# Patient Record
Sex: FEMALE | Race: WHITE | NOT HISPANIC OR LATINO
[De-identification: names, ages, dates, MRNs, and addresses within clinical notes are randomized per-mention and may not be internally consistent; named-entity substitution may affect disease eponyms.]

---

## 2019-04-22 PROBLEM — Z00.00 ENCOUNTER FOR PREVENTIVE HEALTH EXAMINATION: Status: ACTIVE | Noted: 2019-04-22

## 2019-05-08 ENCOUNTER — FORM ENCOUNTER (OUTPATIENT)
Age: 74
End: 2019-05-08

## 2019-05-09 ENCOUNTER — OUTPATIENT (OUTPATIENT)
Dept: OUTPATIENT SERVICES | Facility: HOSPITAL | Age: 74
LOS: 1 days | End: 2019-05-09
Payer: COMMERCIAL

## 2019-05-09 ENCOUNTER — APPOINTMENT (OUTPATIENT)
Dept: NEUROSURGERY | Facility: CLINIC | Age: 74
End: 2019-05-09
Payer: COMMERCIAL

## 2019-05-09 VITALS
OXYGEN SATURATION: 96 % | RESPIRATION RATE: 18 BRPM | HEART RATE: 72 BPM | SYSTOLIC BLOOD PRESSURE: 118 MMHG | DIASTOLIC BLOOD PRESSURE: 66 MMHG

## 2019-05-09 DIAGNOSIS — Z87.39 PERSONAL HISTORY OF OTHER DISEASES OF THE MUSCULOSKELETAL SYSTEM AND CONNECTIVE TISSUE: ICD-10-CM

## 2019-05-09 DIAGNOSIS — Z87.01 PERSONAL HISTORY OF PNEUMONIA (RECURRENT): ICD-10-CM

## 2019-05-09 DIAGNOSIS — Z83.3 FAMILY HISTORY OF DIABETES MELLITUS: ICD-10-CM

## 2019-05-09 DIAGNOSIS — Z82.0 FAMILY HISTORY OF EPILEPSY AND OTHER DISEASES OF THE NERVOUS SYSTEM: ICD-10-CM

## 2019-05-09 DIAGNOSIS — Z78.9 OTHER SPECIFIED HEALTH STATUS: ICD-10-CM

## 2019-05-09 DIAGNOSIS — Z82.49 FAMILY HISTORY OF ISCHEMIC HEART DISEASE AND OTHER DISEASES OF THE CIRCULATORY SYSTEM: ICD-10-CM

## 2019-05-09 PROCEDURE — 72084 X-RAY EXAM ENTIRE SPI 6/> VW: CPT | Mod: 26

## 2019-05-09 PROCEDURE — 72080 X-RAY EXAM THORACOLMB 2/> VW: CPT

## 2019-05-09 PROCEDURE — 99204 OFFICE O/P NEW MOD 45 MIN: CPT

## 2019-05-09 PROCEDURE — 72084 X-RAY EXAM ENTIRE SPI 6/> VW: CPT

## 2019-05-09 PROCEDURE — 72083 X-RAY EXAM ENTIRE SPI 4/5 VW: CPT

## 2019-05-09 PROCEDURE — 72082 X-RAY EXAM ENTIRE SPI 2/3 VW: CPT

## 2019-05-14 PROBLEM — Z78.9 DOES NOT USE ILLICIT DRUGS: Status: ACTIVE | Noted: 2019-05-13

## 2019-05-14 PROBLEM — Z78.9 NON-SMOKER: Status: ACTIVE | Noted: 2019-05-13

## 2019-05-14 PROBLEM — Z83.3 FAMILY HISTORY OF DIABETES MELLITUS: Status: ACTIVE | Noted: 2019-05-13

## 2019-05-14 PROBLEM — Z87.39 HISTORY OF ARTHRITIS: Status: RESOLVED | Noted: 2019-05-13 | Resolved: 2019-05-14

## 2019-05-14 PROBLEM — Z87.01 HISTORY OF PNEUMONIA: Status: RESOLVED | Noted: 2019-05-13 | Resolved: 2019-05-14

## 2019-05-14 PROBLEM — Z87.39 HISTORY OF OSTEOPOROSIS: Status: RESOLVED | Noted: 2019-05-13 | Resolved: 2019-05-14

## 2019-05-14 PROBLEM — Z82.0 FAMILY HISTORY OF MIGRAINE HEADACHES: Status: ACTIVE | Noted: 2019-05-13

## 2019-05-14 PROBLEM — Z82.49 FAMILY HISTORY OF HEART FAILURE: Status: ACTIVE | Noted: 2019-05-13

## 2019-05-14 RX ORDER — OXYCODONE HYDROCHLORIDE AND ACETAMINOPHEN 10; 325 MG/1; MG/1
TABLET ORAL
Refills: 0 | Status: ACTIVE | COMMUNITY

## 2019-05-14 RX ORDER — MORPHINE SULFATE 30 MG/1
TABLET ORAL
Refills: 0 | Status: ACTIVE | COMMUNITY

## 2019-05-14 RX ORDER — CLONAZEPAM 2 MG/1
TABLET ORAL
Refills: 0 | Status: ACTIVE | COMMUNITY

## 2019-05-14 NOTE — DATA REVIEWED
[de-identified] : \par Patient: CARRIE العراقي\par YOB: 1945\par Phone: [phone]\par MRN: [id number] Acc: [id number]\par Date of Exam: 01-\par \par  \par   \par   \par  \par Exam requested by:\par BETY REINA MD\par [address] [facility] , [address]\par [facility] [address]\par \par \par  \par    \par    \par   \par   \par \par \par     \par   \par  \par EXAM: MR LUMBAR SPINE WITHOUT CONTRAST\par \par HISTORY: Low back pain. History of prior surgery.\par \par COMPARISON: MRI performed 4/14/2015\par \par TECHNIQUE: Sections: Sagittal T1, T2 and STIR, cor T1 and axial T1 and T2 weighted sequences were obtained.  \par Scanner: [employer] HDe at 1.5T\par \par FINDINGS:\par \par Alignment:  There is dextroscoliosis with moderate kyphosis at the L1-2 level. Again demonstrated is posterior fixation with bilateral transpedicular screws at L3 and L4 and posterior interconnecting hardware, and a separate lower thoracic and upper lumbar posterior fusion from T11 through L2 with bilateral transpedicular screws and posterior fusion hardware. Prior laminectomy from L1 through L4-5 noted.\par Spinal Cord:  The conus has normal signal intensity and position.\par Marrow Signal:  There is no worrisome bone marrow signal.\par \par T12-L1: There is no canal or foraminal stenosis.\par L1-2: There is a posterior osteophytic ridge and prior interbody fusion. There is no central canal stenosis, with posterior decompression noted. There is soft tissue in the left foramen which is likely epidural scar.  \par L2-3: There has been partial laminectomy. There is no central canal stenosis. There is slight degenerative retrolisthesis.\par L3-4: The canal is widely patent. There is no central canal stenosis. There is no foraminal narrowing.  \par L4-5: There is good posterior decompression, with no canal stenosis. There is mild to moderate foraminal narrowing bilaterally.  \par L5-S1: There are mild degenerative disc changes. There is no central canal or foraminal stenosis.  \par \par Retroperitoneal Structures:  Paraspinal muscle atrophy is again noted.\par \par IMPRESSION: \par Anterior and posterior fusion with prior laminectomy.\par No evidence of new disc herniation or recurrent spinal canal stenosis.\par Kyphosis at L1-2 noted and similar to previous. \par \par \par \par  \par  \par   \par   \par \par Thank you for the opportunity to participate in the care of this patient. \par  \par Aguilar Ansari MD Bd Cert Radiologist  - Electronically Signed: 01- 11:17 AM\par \par  \par   \par  \par   \par   \par  \par Copy to:JENNIFER ESPINOSA MD\par \par \par  \par Exam requested by:BETY REINA MD\par \par \par  \par   \par  [de-identified] : \par Patient: CARRIE العراقي\par YOB: 1945\par Phone: [phone]\par MRN: [id number] Acc: [id number]\par Date of Exam: 04-\par \par  \par   \par   \par  \par Exam requested by:\par BETY REINA MD\par [address] [facility] , [address]\par [facility] [address]\par \par \par  \par    \par    \par   \par   \par \par \par     \par   \par  \par EXAM: MR CERVICAL SPINE WITHOUT AND WITH CONTRAST\par \par HISTORY: Patient states neck pain; other\par \par TECHNIQUE: T1-weighted, T2-weighted and inversion recovery pulse sequences used to image the cervical spine in sagittal and axial plane, before and after intravenous gadolinium.  10 cc of Dotarem from a 10 cc single-use vial was injected.\par Scanner: [employer] HDe at 1.5T\par \par COMPARISON: X-ray cervical spine 6/27/2018 \par \par FINDINGS:\par \par There has been anterior cervical discectomy and fusion at C3 down to C6 level.\par \par At C3-C4 there is degenerative spondylosis with arthrosis of the joints of Luschka on the left.\par \par At C4-C5 there is degenerative disc disease with arthrosis of the joints of Luschka.\par \par At C5-6 there is degenerative spondylosis, arthrosis of the joints of Luschka with the spondylitic ridge on the right contributing to impingement on the right C6 nerve root in the lateral recess and neural foramen.\par \par At C6-C7 there is degenerative disc disease and spondylosis.\par \par At C7-T1 the disc is unremarkable.\par \par There is impingement on the ventral subarachnoid space at multiple levels with no high-grade spinal stenosis.\par \par No intramedullary signal abnormality. No pathologic enhancement.\par \par The craniovertebral junction is normal.\par \par The marrow is normal.\par \par Small right thyroid nodules are seen, one of them measuring 1.5 cm.\par \par IMPRESSION: Status post anterior cervical discectomy and fusion at C3 down through C6. Chronic degenerative spondylosis and arthrosis of the joints of Luschka. At C5-C6 the right-sided osteophytic ridge and arthrosis of the joints of Luschka impinge on the right C6 nerve root. \par \par \par \par  \par  \par   \par   \par \par Thank you for the opportunity to participate in the care of this patient. \par  \par Maged Talley MD Bd Cert Radiologist  - Electronically Signed: 04- 11:27 AM\par \par  \par   \par  \par   \par   \par  \par Copy to:CANDY POLLARD NP\par \par \par  \par Exam requested by:BETY REINA MD\par \par \par  \par   \par \par Exam requested by:\par BETY REINA MD\par [address] [place], [address]\par [facility] [address]\par \par \par  \par    \par    \par   \par   \par \par \par     \par   \par  \par EXAM:  MR THORACIC SPINE WITHOUT CONTRAST\par \par HISTORY:  Right-sided mid back pain\par \par COMPARISON: CT performed 5/16/2017\par \par TECHNIQUE:  Sagittal T1, T2, and STIR and axial T1 and T2 weighted sequences were obtained. Intravenous contrast: None. Scanner: [employer] HDe at 1.5T    \par \par FINDINGS: \par Alignment:   images through the cervical spine demonstrate anterior cervical fusion from C3 through C6.\par There are is posterior fusion from T11 through L2, with posterior interconnecting hardware and transpedicular screws. There are significant degenerative disc changes at T10-11. Kyphosis at L1-2 is noted.\par Spinal Cord:  There is canal narrowing with ventral impression on the cord at the T10-11 level. There is no evidence of cord signal abnormality.\par Marrow Signal:  There is no worrisome bone marrow signal.\par \par T1-T2: There is a small right paracentral protrusion with mild narrowing of the central canal.\par T7-T8: There is a small posterior osteophytic ridge. There is no significant canal or foraminal stenosis.\par T10-11: There is a central disc extrusion with osteophyte that causes moderate spinal canal stenosis with impression on the thoracic spinal cord. Correlating with the previous CT, there is a moderate amount of ossification associated with the disc material.\par \par Soft Tissue Structures:  Unremarkable.\par \par IMPRESSION:  \par Postsurgical changes. Prior posterior fusion from T11 through L2, with anterior cervical fusion noted as well.\par Moderate spinal canal narrowing at T10-11 due to a central disc extrusion which is at least partially ossified. The degree of canal narrowing appears similar to the previous CT performed 5/16/2017.    \par \par \par \par  \par  \par   \par   \par \par Thank you for the opportunity to participate in the care of this patient. \par  \par Aguilar Ansari MD Bd Cert Radiologist  - Electronically Signed: 01- 11:25 AM\par \par  \par   \par  \par   \par   \par  \par Copy to:JENNIFER ESPINOSA MD\par \par \par  \par Exam requested by:BETY REINA MD\par \par \par  \par  \par

## 2019-05-14 NOTE — PLAN
[FreeTextEntry1] : Plan:\par Case will be presented at spine conference. \par Will obtain x-rays of the standing spine, hip flexion/extension

## 2019-05-14 NOTE — RESULTS/DATA
[FreeTextEntry1] : REPORT 4/17/19\par MRI Cervical Spine- Chronic Degenerative Spondylosis (See Report for details)\par MRI thoracic-Chronic midline osteophytic ridge T10-T11 (see report for details)\par \par \par \par \par

## 2019-05-14 NOTE — ASSESSMENT
[FreeTextEntry1] : Comprehensive Spine Conference Discussion. Final Plan pending.\par Comprehensive Spinal Xray including scoliosis series\par Education provided regarding plan of care.\par \par

## 2019-05-14 NOTE — PHYSICAL EXAM
[General Appearance - Alert] : alert [General Appearance - In No Acute Distress] : in no acute distress [Impaired Insight] : insight and judgment were intact [Oriented To Time, Place, And Person] : oriented to person, place, and time [Cranial Nerves Optic (II)] : visual acuity intact bilaterally,  pupils equal round and reactive to light [Cranial Nerves Oculomotor (III)] : extraocular motion intact [Cranial Nerves Trigeminal (V)] : facial sensation intact symmetrically [Cranial Nerves Vestibulocochlear (VIII)] : hearing was intact bilaterally [Cranial Nerves Facial (VII)] : face symmetrical [Cranial Nerves Glossopharyngeal (IX)] : tongue and palate midline [Cranial Nerves Accessory (XI - Cranial And Spinal)] : head turning and shoulder shrug symmetric [Sensation Tactile Decrease] : light touch was intact [Cranial Nerves Hypoglossal (XII)] : there was no tongue deviation with protrusion [Limited Balance] : the patient's balance was impaired [Outer Ear] : the ears and nose were normal in appearance [] : no respiratory distress [Exaggerated Use Of Accessory Muscles For Inspiration] : no accessory muscle use [Heart Rate And Rhythm] : heart rate was normal and rhythm regular [Abdomen Soft] : soft [Skin Color & Pigmentation] : normal skin color and pigmentation [FreeTextEntry6] : Weakness in Bilateral Lower extremities [FreeTextEntry1] : Mild weakness BL LE

## 2019-05-14 NOTE — HISTORY OF PRESENT ILLNESS
[FreeTextEntry1] : CHRONIC Lowerback pain and Balance difficulty [de-identified] : 72 yo woman presenting for evaluation of low back pain as well as balance and gait issues. Patient has had multiple spinal surgeries in the past, including a C3-C6 anterior fusion in 2015 and T11-L2 posterior fusion in 2016. Since then, patient continues to complain of low back pain. Her legs also feel more "spastic" and will lock occasionally while she is walking, causing her to have trouble with her balance and gait. Patient is currently ambulating without assistance, but only for short distances. Patient denies leg weakness and numbness. Patient was also told she has an impinged nerve root in her neck, but is asymptomatic, denies upper extremity weakness or sensory changes.   \par \par Exam: patellar reflex mildly brisk\par \par Plan:\par Case will be presented at spine conference. \par Will obtain x-rays of the standing spine, hip flexion/extension

## 2019-06-13 ENCOUNTER — APPOINTMENT (OUTPATIENT)
Dept: NEUROSURGERY | Facility: CLINIC | Age: 74
End: 2019-06-13
Payer: COMMERCIAL

## 2019-06-13 VITALS
SYSTOLIC BLOOD PRESSURE: 121 MMHG | DIASTOLIC BLOOD PRESSURE: 67 MMHG | RESPIRATION RATE: 16 BRPM | OXYGEN SATURATION: 99 % | WEIGHT: 116 LBS | HEART RATE: 75 BPM | HEIGHT: 65 IN | BODY MASS INDEX: 19.33 KG/M2

## 2019-06-13 PROCEDURE — 99215 OFFICE O/P EST HI 40 MIN: CPT

## 2019-06-17 ENCOUNTER — OUTPATIENT (OUTPATIENT)
Dept: OUTPATIENT SERVICES | Facility: HOSPITAL | Age: 74
LOS: 1 days | End: 2019-06-17
Payer: COMMERCIAL

## 2019-06-17 DIAGNOSIS — Z22.321 CARRIER OR SUSPECTED CARRIER OF METHICILLIN SUSCEPTIBLE STAPHYLOCOCCUS AUREUS: ICD-10-CM

## 2019-06-17 DIAGNOSIS — M54.6 PAIN IN THORACIC SPINE: ICD-10-CM

## 2019-06-17 PROCEDURE — 87641 MR-STAPH DNA AMP PROBE: CPT

## 2019-06-18 LAB
MRSA PCR RESULT.: NEGATIVE — SIGNIFICANT CHANGE UP
S AUREUS DNA NOSE QL NAA+PROBE: NEGATIVE — SIGNIFICANT CHANGE UP

## 2019-06-18 NOTE — ASSESSMENT
[FreeTextEntry1] : Complex Thoracic Laminectomy and fusion recommended and pt is ready to proceed.\par Dr. Talon Retana will be involved in case.\par PST informational package provided. Need comprehensive medical clearance with [facility].\par Education provided regarding plan of care.\par \par

## 2019-06-18 NOTE — ADDENDUM
[FreeTextEntry1] : \par 		June 13, 2019\par \par Chadwick Bello MD\par [address] [facility]\[place] [address]\par \par Re:	Nicole Baeza \par \par Dear Dr. Bello:\par \par I saw Nicole and her  once again today.  We did review her films in our spine conference which included MRIs and plain x-rays, which do in fact show evidence of hardware breakage in her lumbar spine along with what appears to be a significant disc with cord compression in her midthoracic spine above her prior hardware.  This was confirmed by CT that she brought with her today dated May 21, 2019, which shows a large calcified disc at the level of her midthoracic spine with a significant amount of cord compression just above her prior hardware.\par \par I do think this disc is likely causing Nicole’s complaints of what sounds more like spasticity and ambulatory weakness in her legs.  We think this should be removed, and we would like to keep the surgery as simple as possible given the complexity of her spinal deformity.  It is our impression that this needs to be accomplished either through a lateral transversectomy approach versus a thoracotomy, and we will make a final decision regarding approach with our spine team.  In the interim, I have asked Nicole to get medical clearance for possible surgery sometime in July when we remove this disc fragment with the hopes of decompressing her spinal cord and improving her symptoms.  We did review the risks, benefits, and alternatives to surgery.  The fact of the matter is that if this disc is left alone, she is likely to progress over time, and that would leave her likely  myelopathic in her legs and paraparetic at the minimum in the near term.\par \par \par Nicole fully understands the recommendation and will be back in touch with us regarding setting a surgical date.  Thanks so much for your kind referral. \par \par Sincerely,\par \par \par \par Carlos Leon MD\par \par DL/ag [facility] #[id number]\par \par \par

## 2019-06-18 NOTE — PHYSICAL EXAM
[General Appearance - Alert] : alert [Impaired Insight] : insight and judgment were intact [General Appearance - In No Acute Distress] : in no acute distress [Oriented To Time, Place, And Person] : oriented to person, place, and time [Cranial Nerves Optic (II)] : visual acuity intact bilaterally,  pupils equal round and reactive to light [I: Normal Smell] : smell intact bilaterally [Cranial Nerves Trigeminal (V)] : facial sensation intact symmetrically [Cranial Nerves Oculomotor (III)] : extraocular motion intact [Cranial Nerves Vestibulocochlear (VIII)] : hearing was intact bilaterally [Cranial Nerves Facial (VII)] : face symmetrical [Cranial Nerves Accessory (XI - Cranial And Spinal)] : head turning and shoulder shrug symmetric [Cranial Nerves Glossopharyngeal (IX)] : tongue and palate midline [Cranial Nerves Hypoglossal (XII)] : there was no tongue deviation with protrusion [Limited Balance] : the patient's balance was impaired [Straight-Leg Raise Test - Left] : straight leg raise of the left leg was positive [3+] : Ankle jerk left 3+ [Straight-Leg Raise Test - Right] : straight leg raise of the right leg was positive [Antalgic] : antalgic [Sclera] : the sclera and conjunctiva were normal [Neck Appearance] : the appearance of the neck was normal [Outer Ear] : the ears and nose were normal in appearance [] : no respiratory distress [Exaggerated Use Of Accessory Muscles For Inspiration] : no accessory muscle use [Abdomen Soft] : soft [Heart Rate And Rhythm] : heart rate was normal and rhythm regular [Skin Color & Pigmentation] : normal skin color and pigmentation [FreeTextEntry6] : Bilateral Leg weakness [Able to heel walk] : the patient was not able to heel walk [Able to toe walk] : the patient was not able to toe walk

## 2019-06-18 NOTE — DATA REVIEWED
[de-identified] : THORACIC [de-identified] : 	\par EXAM: MRI THORACIC SPINE, WITH AND WITHOUT CONTRAST\par \par HISTORY: Other dorsalgia\par \par TECHNIQUE: T1-weighted, T2-weighted and inversion recovery pulse sequence used to image the thoracic spine sagittal and axial plane, before and after intravenous gadolinium. 10 cc of Dotarem from a 10 cc vial, 1.5 jaye magnet.    \par \par Scanner: [employer] HDe at 1.5T\par \par COMPARISON: 1/18/2019\par \par FINDINGS: \par \par Again noted is the fusion hardware in the lower thoracic and upper lumbar spine. \par \par No new suspicious marrow replacement or marrow edema. \par \par At T10-T11, again noted is the large midline osteophytic ridge impinging on the thoracic cord.\par \par No foraminal or spinal stenosis. \par \par The conus is normal. \par \par No paraspinal mass.\par \par No pathologic enhancement.\par \par IMPRESSION: Chronic midline osteophytic ridge at T10-T11 disc level impinging on the thoracic cord, similarly seen in January.\par Thank you for the opportunity to participate in the care of this patient. \par  \par Maged Talley MD Bd Cert Radiologist  - Electronically Signed: 04- 11:58 AM\par Copy to:LIU BARRERA MD\par Exam requested by:FANY REINA MD\par

## 2019-06-18 NOTE — REASON FOR VISIT
[Follow-Up: _____] : a [unfilled] follow-up visit [FreeTextEntry1] : Here for follow up discussion of plan of care.\par She underwent additional workup\par Case discussed at Spine conference.\par She continues to reports worsening gait difficulty and weakness in legs as well as worsening posture.

## 2019-06-18 NOTE — REVIEW OF SYSTEMS
[Feeling Tired] : feeling tired [Feeling Poorly] : feeling poorly [Leg Weakness] : leg weakness [Poor Coordination] : poor coordination [Tingling] : tingling [Numbness] : numbness [Abnormal Sensation] : an abnormal sensation [Hypersensitivity] : hypersensitivity [Difficulty Walking] : difficulty walking [Limping] : limping [Ataxia] : ataxia [Joint Pain] : joint pain [Arthralgias] : arthralgias [Limb Pain] : limb pain [Negative] : Heme/Lymph [Difficulty Writing] : no difficulty writing

## 2019-06-18 NOTE — RESULTS/DATA
[FreeTextEntry1] : Exam requested by:\par FANY REINA MD\par [address] [facility] , [address]\par [place] [address]\par EXAM: MR LUMBAR SPINE WITHOUT AND WITH CONTRAST\par \par HISTORY: Low back pain. History of prior surgery.\par \par TECHNIQUE: \par Sections: Sagittal T1, T2 and STIR, and axial T1 and T2 weighted sequences were obtained.   Postcontrast sagittal and axial T1 weighted sequences were obtained.  \par Intravenous Contrast:  10 cc of Dotarem was injected from a single dose 10 cc vial. \par Scanner: [email or web address] HDe at 1.5T\par \par COMPARISON: MRI lumbar spine 1/18/2019 and CT lumbar spine 5/16/2017.\par \par FINDINGS: \par \par Again seen are postsurgical changes of thoracolumbar posterior fusion extending from the T11-T4 with pedicle screws and rods. There are interbody implants at L3-L4 and L4-L5. Posterior interconnecting hardware at L1-L2 is again seen. Again seen are bilateral laminectomy defects at L1-L2, L3-L4, and L4-L5. Vertebral body heights are maintained. No focal bone marrow edema. Dextroscoliosis is again noted.\par \par Imaged lower spinal cord is normal in size and signal intensity. Conus medullaris is at L1. Paraspinal muscle atrophy is again noted. No abnormal enhancement identified.\par \par Evaluation of individual lumbar levels demonstrates:\par T12-L1: No significant spinal canal or neuroforaminal stenosis.\par L1-2: Postsurgical change with posterior decompression. Small disc osteophytic ridging which indents the ventral thecal sac without significant spinal canal stenosis. No significant neuroforaminal narrowing.\par L2-3: Postsurgical changes. No significant spinal canal stenosis. Mild left neuroforaminal narrowing.\par L3-4: Postsurgical change with posterior decompression. No spinal canal stenosis. Mild neuroforaminal narrowing.\par L4-5: Postsurgical change with posterior decompression. No spinal canal stenosis. Mild bilateral neuroforaminal narrowing.\par L5-S1:Mild facet arthropathy. No significant spinal canal or neuroforaminal stenosis.\par \par IMPRESSION: \par \par Thoracolumbar fusion with postoperative change, similar to prior. No significant spinal canal stenosis. No new disc herniation.\par Thank you for the opportunity to participate in the care of this patient. \par  \par Liu Tavarez MD Bd Eligible Radiologist  - Electronically Signed: 04- 10:44 AM\par Copy to:LIU BARRERA MD\par Exam requested by:FANY ERINA MD\par \par 	\par [facility]\par [address]\[place] [address]\par Phone: [phone]\par Fax: [phone]\par  \par  \par \par Patient: CARRIE العراقي\par YOB: 1945\par Phone: [phone]\par MRN: [id number] Acc: [id number]\par Date of Exam: 04-\par Exam requested by:\par BETY REINA MD\par [address] [facility] , [address]\par [facility] [address]\par EXAM: MR CERVICAL SPINE WITHOUT AND WITH CONTRAST\par \par HISTORY: Patient states neck pain; other\par \par TECHNIQUE: T1-weighted, T2-weighted and inversion recovery pulse sequences used to image the cervical spine in sagittal and axial plane, before and after intravenous gadolinium.  10 cc of Dotarem from a 10 cc single-use vial was injected.\par Scanner: [email or web address] HDe at 1.5T\par \par COMPARISON: X-ray cervical spine 6/27/2018 \par \par FINDINGS:\par \par There has been anterior cervical discectomy and fusion at C3 down to C6 level.\par \par At C3-C4 there is degenerative spondylosis with arthrosis of the joints of Luschka on the left.\par \par At C4-C5 there is degenerative disc disease with arthrosis of the joints of Luschka.\par \par At C5-6 there is degenerative spondylosis, arthrosis of the joints of Luschka with the spondylitic ridge on the right contributing to impingement on the right C6 nerve root in the lateral recess and neural foramen.\par \par At C6-C7 there is degenerative disc disease and spondylosis.\par \par At C7-T1 the disc is unremarkable.\par \par There is impingement on the ventral subarachnoid space at multiple levels with no high-grade spinal stenosis.\par \par No intramedullary signal abnormality. No pathologic enhancement.\par \par The craniovertebral junction is normal.\par \par The marrow is normal.\par \par Small right thyroid nodules are seen, one of them measuring 1.5 cm.\par \par IMPRESSION: Status post anterior cervical discectomy and fusion at C3 down through C6. Chronic degenerative spondylosis and arthrosis of the joints of Luschka. At C5-C6 the right-sided osteophytic ridge and arthrosis of the joints of Luschka impinge on the right C6 nerve root. \par Thank you for the opportunity to participate in the care of this patient. \par  \par Maged Talley MD Bd Cert Radiologist  - Electronically Signed: 04- 11:27 AM\par Copy to:CANDY POLLARD NP\par Exam requested by:BETY REINA MD\par Confidential\par \par \par Exam requested by:\par BETY REINA MD\par 4 [facility] , [address]\par [facility] [address]\par EXAM: MR CERVICAL SPINE WITHOUT AND WITH CONTRAST\par \par HISTORY: Patient states neck pain; other\par \par TECHNIQUE: T1-weighted, T2-weighted and inversion recovery pulse sequences used to image the cervical spine in sagittal and axial plane, before and after intravenous gadolinium.  10 cc of Dotarem from a 10 cc single-use vial was injected.\par Scanner: [email or web address] Signa ResoServ HDe at 1.5T\par \par COMPARISON: X-ray cervical spine 6/27/2018 \par \par FINDINGS:\par \par There has been anterior cervical discectomy and fusion at C3 down to C6 level.\par \par At C3-C4 there is degenerative spondylosis with arthrosis of the joints of Luschka on the left.\par \par At C4-C5 there is degenerative disc disease with arthrosis of the joints of Luschka.\par \par At C5-6 there is degenerative spondylosis, arthrosis of the joints of Luschka with the spondylitic ridge on the right contributing to impingement on the right C6 nerve root in the lateral recess and neural foramen.\par \par At C6-C7 there is degenerative disc disease and spondylosis.\par \par At C7-T1 the disc is unremarkable.\par \par There is impingement on the ventral subarachnoid space at multiple levels with no high-grade spinal stenosis.\par \par No intramedullary signal abnormality. No pathologic enhancement.\par \par The craniovertebral junction is normal.\par \par The marrow is normal.\par \par Small right thyroid nodules are seen, one of them measuring 1.5 cm.\par \par IMPRESSION: Status post anterior cervical discectomy and fusion at C3 down through C6. Chronic degenerative spondylosis and arthrosis of the joints of Luschka. At C5-C6 the right-sided osteophytic ridge and arthrosis of the joints of Luschka impinge on the right C6 nerve root. \par Thank you for the opportunity to participate in the care of this patient. \par  \par Maged Talley MD Bd Cert Radiologist  - Electronically Signed: 04- 11:27 AM\par Copy to:CANDY POLLARD NP\par Exam requested by:BETY REINA MD\par Confidential\par  \par ACCREDITATIONS .[facility] \par [facility]\[place] [address]\par tel. [phone]\par [facility]\par [facility] at [facility]\[place] [address]\par tel. [phone]\par [facility] [place] [address]\par tel. [phone]\par [facility]\par [facility]\[place] [address]\par tel. [phone]\par [facility] [place]  [address]\par tel. [phone]\par [facility]\[place] [address]\par tel. [phone]\par Printed: 06- 3:14 PM\par CARRIE العراقي (Exam: 04- 2:45 PM)\par Page 1 of 1\par

## 2019-07-16 ENCOUNTER — FORM ENCOUNTER (OUTPATIENT)
Age: 74
End: 2019-07-16

## 2019-07-17 ENCOUNTER — OUTPATIENT (OUTPATIENT)
Dept: OUTPATIENT SERVICES | Facility: HOSPITAL | Age: 74
LOS: 1 days | End: 2019-07-17
Payer: COMMERCIAL

## 2019-07-17 ENCOUNTER — APPOINTMENT (OUTPATIENT)
Dept: SPINE | Facility: CLINIC | Age: 74
End: 2019-07-17
Payer: COMMERCIAL

## 2019-07-17 VITALS
HEART RATE: 67 BPM | HEIGHT: 65 IN | SYSTOLIC BLOOD PRESSURE: 128 MMHG | DIASTOLIC BLOOD PRESSURE: 69 MMHG | BODY MASS INDEX: 19.33 KG/M2 | RESPIRATION RATE: 18 BRPM | OXYGEN SATURATION: 100 % | WEIGHT: 116 LBS

## 2019-07-17 PROCEDURE — 72110 X-RAY EXAM L-2 SPINE 4/>VWS: CPT | Mod: 26

## 2019-07-17 PROCEDURE — 72110 X-RAY EXAM L-2 SPINE 4/>VWS: CPT

## 2019-07-17 PROCEDURE — 99214 OFFICE O/P EST MOD 30 MIN: CPT

## 2019-07-19 VITALS
SYSTOLIC BLOOD PRESSURE: 142 MMHG | TEMPERATURE: 96 F | HEIGHT: 65 IN | OXYGEN SATURATION: 96 % | WEIGHT: 118.83 LBS | DIASTOLIC BLOOD PRESSURE: 67 MMHG | HEART RATE: 60 BPM | RESPIRATION RATE: 16 BRPM

## 2019-07-22 ENCOUNTER — INPATIENT (INPATIENT)
Facility: HOSPITAL | Age: 74
LOS: 10 days | Discharge: HOME | DRG: 460 | End: 2019-08-02
Attending: NEUROLOGICAL SURGERY | Admitting: NEUROLOGICAL SURGERY
Payer: COMMERCIAL

## 2019-07-22 ENCOUNTER — RESULT REVIEW (OUTPATIENT)
Age: 74
End: 2019-07-22

## 2019-07-22 DIAGNOSIS — Z98.890 OTHER SPECIFIED POSTPROCEDURAL STATES: Chronic | ICD-10-CM

## 2019-07-22 DIAGNOSIS — M81.0 AGE-RELATED OSTEOPOROSIS WITHOUT CURRENT PATHOLOGICAL FRACTURE: ICD-10-CM

## 2019-07-22 DIAGNOSIS — Z98.1 ARTHRODESIS STATUS: Chronic | ICD-10-CM

## 2019-07-22 DIAGNOSIS — M47.14 OTHER SPONDYLOSIS WITH MYELOPATHY, THORACIC REGION: ICD-10-CM

## 2019-07-22 DIAGNOSIS — R09.89 OTHER SPECIFIED SYMPTOMS AND SIGNS INVOLVING THE CIRCULATORY AND RESPIRATORY SYSTEMS: ICD-10-CM

## 2019-07-22 DIAGNOSIS — T43.225A ADVERSE EFFECT OF SELECTIVE SEROTONIN REUPTAKE INHIBITORS, INITIAL ENCOUNTER: ICD-10-CM

## 2019-07-22 DIAGNOSIS — M48.04 SPINAL STENOSIS, THORACIC REGION: ICD-10-CM

## 2019-07-22 DIAGNOSIS — M96.0 PSEUDARTHROSIS AFTER FUSION OR ARTHRODESIS: ICD-10-CM
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
## 2019-07-22 DIAGNOSIS — F32.9 MAJOR DEPRESSIVE DISORDER, SINGLE EPISODE, UNSPECIFIED: ICD-10-CM

## 2019-07-22 DIAGNOSIS — F41.9 ANXIETY DISORDER, UNSPECIFIED: ICD-10-CM

## 2019-07-22 DIAGNOSIS — Z98.1 ARTHRODESIS STATUS: ICD-10-CM

## 2019-07-22 DIAGNOSIS — Z98.890 OTHER SPECIFIED POSTPROCEDURAL STATES: ICD-10-CM
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
## 2019-07-22 DIAGNOSIS — M54.16 RADICULOPATHY, LUMBAR REGION: ICD-10-CM

## 2019-07-22 DIAGNOSIS — M54.9 DORSALGIA, UNSPECIFIED: ICD-10-CM

## 2019-07-22 DIAGNOSIS — D62 ACUTE POSTHEMORRHAGIC ANEMIA: ICD-10-CM

## 2019-07-22 DIAGNOSIS — E87.1 HYPO-OSMOLALITY AND HYPONATREMIA: ICD-10-CM

## 2019-07-22 DIAGNOSIS — E16.1 OTHER HYPOGLYCEMIA: ICD-10-CM
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
## 2019-07-22 DIAGNOSIS — K59.00 CONSTIPATION, UNSPECIFIED: ICD-10-CM

## 2019-07-22 DIAGNOSIS — Z86.718 PERSONAL HISTORY OF OTHER VENOUS THROMBOSIS AND EMBOLISM: ICD-10-CM

## 2019-07-22 DIAGNOSIS — M51.04 INTERVERTEBRAL DISC DISORDERS WITH MYELOPATHY, THORACIC REGION: ICD-10-CM

## 2019-07-22 DIAGNOSIS — G95.89 OTHER SPECIFIED DISEASES OF SPINAL CORD: ICD-10-CM

## 2019-07-22 LAB
ANION GAP SERPL CALC-SCNC: 9 MMOL/L — SIGNIFICANT CHANGE UP (ref 5–17)
BASE EXCESS BLDA CALC-SCNC: -2.4 MMOL/L — LOW (ref -2–3)
BASOPHILS # BLD AUTO: 0.03 K/UL — SIGNIFICANT CHANGE UP (ref 0–0.2)
BASOPHILS NFR BLD AUTO: 0.2 % — SIGNIFICANT CHANGE UP (ref 0–2)
BUN SERPL-MCNC: 11 MG/DL — SIGNIFICANT CHANGE UP (ref 7–23)
CA-I BLDA-SCNC: 1.01 MMOL/L — LOW (ref 1.12–1.3)
CALCIUM SERPL-MCNC: 7.9 MG/DL — LOW (ref 8.4–10.5)
CHLORIDE SERPL-SCNC: 101 MMOL/L — SIGNIFICANT CHANGE UP (ref 96–108)
CO2 SERPL-SCNC: 23 MMOL/L — SIGNIFICANT CHANGE UP (ref 22–31)
COHGB MFR BLDA: 0.2 % — SIGNIFICANT CHANGE UP
CREAT SERPL-MCNC: 0.56 MG/DL — SIGNIFICANT CHANGE UP (ref 0.5–1.3)
EOSINOPHIL # BLD AUTO: 0.02 K/UL — SIGNIFICANT CHANGE UP (ref 0–0.5)
EOSINOPHIL NFR BLD AUTO: 0.2 % — SIGNIFICANT CHANGE UP (ref 0–6)
GLUCOSE BLDC GLUCOMTR-MCNC: 142 MG/DL — HIGH (ref 70–99)
GLUCOSE BLDC GLUCOMTR-MCNC: 154 MG/DL — HIGH (ref 70–99)
GLUCOSE SERPL-MCNC: 193 MG/DL — HIGH (ref 70–99)
HCO3 BLDA-SCNC: 21 MMOL/L — SIGNIFICANT CHANGE UP (ref 21–28)
HCT VFR BLD CALC: 31.6 % — LOW (ref 34.5–45)
HGB BLD-MCNC: 10.1 G/DL — LOW (ref 11.5–15.5)
HGB BLDA-MCNC: 11.1 G/DL — LOW (ref 11.5–15.5)
IMM GRANULOCYTES NFR BLD AUTO: 0.8 % — SIGNIFICANT CHANGE UP (ref 0–1.5)
LYMPHOCYTES # BLD AUTO: 1.01 K/UL — SIGNIFICANT CHANGE UP (ref 1–3.3)
LYMPHOCYTES # BLD AUTO: 7.8 % — LOW (ref 13–44)
MCHC RBC-ENTMCNC: 29 PG — SIGNIFICANT CHANGE UP (ref 27–34)
MCHC RBC-ENTMCNC: 32 GM/DL — SIGNIFICANT CHANGE UP (ref 32–36)
MCV RBC AUTO: 90.8 FL — SIGNIFICANT CHANGE UP (ref 80–100)
METHGB MFR BLDA: 0.6 % — SIGNIFICANT CHANGE UP
MONOCYTES # BLD AUTO: 0.15 K/UL — SIGNIFICANT CHANGE UP (ref 0–0.9)
MONOCYTES NFR BLD AUTO: 1.2 % — LOW (ref 2–14)
NEUTROPHILS # BLD AUTO: 11.69 K/UL — HIGH (ref 1.8–7.4)
NEUTROPHILS NFR BLD AUTO: 89.8 % — HIGH (ref 43–77)
NRBC # BLD: 0 /100 WBCS — SIGNIFICANT CHANGE UP (ref 0–0)
O2 CT VFR BLDA CALC: 16.4 ML/DL — SIGNIFICANT CHANGE UP (ref 15–23)
OXYHGB MFR BLDA: 99 % — SIGNIFICANT CHANGE UP (ref 94–100)
PCO2 BLDA: 32 MMHG — SIGNIFICANT CHANGE UP (ref 32–45)
PH BLDA: 7.44 — SIGNIFICANT CHANGE UP (ref 7.35–7.45)
PLATELET # BLD AUTO: 218 K/UL — SIGNIFICANT CHANGE UP (ref 150–400)
PO2 BLDA: 375 MMHG — HIGH (ref 83–108)
POTASSIUM BLDA-SCNC: 3.3 MMOL/L — LOW (ref 3.5–4.9)
POTASSIUM SERPL-MCNC: 3.5 MMOL/L — SIGNIFICANT CHANGE UP (ref 3.5–5.3)
POTASSIUM SERPL-SCNC: 3.5 MMOL/L — SIGNIFICANT CHANGE UP (ref 3.5–5.3)
RBC # BLD: 3.48 M/UL — LOW (ref 3.8–5.2)
RBC # FLD: 14.1 % — SIGNIFICANT CHANGE UP (ref 10.3–14.5)
SAO2 % BLDA: 100 % — SIGNIFICANT CHANGE UP (ref 95–100)
SODIUM BLDA-SCNC: 132 MMOL/L — LOW (ref 138–146)
SODIUM SERPL-SCNC: 133 MMOL/L — LOW (ref 135–145)
WBC # BLD: 13 K/UL — HIGH (ref 3.8–10.5)
WBC # FLD AUTO: 13 K/UL — HIGH (ref 3.8–10.5)

## 2019-07-22 PROCEDURE — 22614 ARTHRD PST TQ 1NTRSPC EA ADD: CPT | Mod: 62

## 2019-07-22 PROCEDURE — 22842 INSERT SPINE FIXATION DEVICE: CPT | Mod: 80

## 2019-07-22 PROCEDURE — 63046 LAM FACETEC & FORAMOT THRC: CPT | Mod: 80

## 2019-07-22 PROCEDURE — 63046 LAM FACETEC & FORAMOT THRC: CPT

## 2019-07-22 PROCEDURE — 61783 SCAN PROC SPINAL: CPT | Mod: 80,59

## 2019-07-22 PROCEDURE — 63048 LAM FACETEC &FORAMOT EA ADDL: CPT | Mod: 80

## 2019-07-22 PROCEDURE — 22610 ARTHRD PST TQ 1NTRSPC THRC: CPT | Mod: 62

## 2019-07-22 PROCEDURE — 99221 1ST HOSP IP/OBS SF/LOW 40: CPT

## 2019-07-22 PROCEDURE — 22842 INSERT SPINE FIXATION DEVICE: CPT

## 2019-07-22 PROCEDURE — 63048 LAM FACETEC &FORAMOT EA ADDL: CPT

## 2019-07-22 PROCEDURE — 61783 SCAN PROC SPINAL: CPT | Mod: 59

## 2019-07-22 PROCEDURE — 72128 CT CHEST SPINE W/O DYE: CPT | Mod: 26

## 2019-07-22 RX ORDER — TRAZODONE HCL 50 MG
0 TABLET ORAL
Qty: 0 | Refills: 0 | DISCHARGE

## 2019-07-22 RX ORDER — DEXAMETHASONE 0.5 MG/5ML
4 ELIXIR ORAL EVERY 6 HOURS
Refills: 0 | Status: DISCONTINUED | OUTPATIENT
Start: 2019-07-22 | End: 2019-07-25

## 2019-07-22 RX ORDER — HYDROMORPHONE HYDROCHLORIDE 2 MG/ML
0.5 INJECTION INTRAMUSCULAR; INTRAVENOUS; SUBCUTANEOUS
Refills: 0 | Status: DISCONTINUED | OUTPATIENT
Start: 2019-07-22 | End: 2019-07-23

## 2019-07-22 RX ORDER — SODIUM CHLORIDE 9 MG/ML
1000 INJECTION, SOLUTION INTRAVENOUS
Refills: 0 | Status: DISCONTINUED | OUTPATIENT
Start: 2019-07-22 | End: 2019-07-24

## 2019-07-22 RX ORDER — DOCUSATE SODIUM 100 MG
100 CAPSULE ORAL THREE TIMES A DAY
Refills: 0 | Status: DISCONTINUED | OUTPATIENT
Start: 2019-07-22 | End: 2019-08-02

## 2019-07-22 RX ORDER — DEXTROSE 50 % IN WATER 50 %
25 SYRINGE (ML) INTRAVENOUS ONCE
Refills: 0 | Status: DISCONTINUED | OUTPATIENT
Start: 2019-07-22 | End: 2019-08-02

## 2019-07-22 RX ORDER — TRAZODONE HCL 50 MG
25 TABLET ORAL AT BEDTIME
Refills: 0 | Status: DISCONTINUED | OUTPATIENT
Start: 2019-07-22 | End: 2019-07-25

## 2019-07-22 RX ORDER — CEFEPIME 1 G/1
1000 INJECTION, POWDER, FOR SOLUTION INTRAMUSCULAR; INTRAVENOUS EVERY 12 HOURS
Refills: 0 | Status: COMPLETED | OUTPATIENT
Start: 2019-07-23 | End: 2019-07-23

## 2019-07-22 RX ORDER — DIAZEPAM 5 MG
5 TABLET ORAL EVERY 8 HOURS
Refills: 0 | Status: DISCONTINUED | OUTPATIENT
Start: 2019-07-22 | End: 2019-07-29

## 2019-07-22 RX ORDER — PANTOPRAZOLE SODIUM 20 MG/1
40 TABLET, DELAYED RELEASE ORAL
Refills: 0 | Status: DISCONTINUED | OUTPATIENT
Start: 2019-07-22 | End: 2019-07-22

## 2019-07-22 RX ORDER — HYDROMORPHONE HYDROCHLORIDE 2 MG/ML
30 INJECTION INTRAMUSCULAR; INTRAVENOUS; SUBCUTANEOUS
Refills: 0 | Status: DISCONTINUED | OUTPATIENT
Start: 2019-07-22 | End: 2019-07-23

## 2019-07-22 RX ORDER — SENNA PLUS 8.6 MG/1
2 TABLET ORAL AT BEDTIME
Refills: 0 | Status: DISCONTINUED | OUTPATIENT
Start: 2019-07-22 | End: 2019-08-02

## 2019-07-22 RX ORDER — ONDANSETRON 8 MG/1
4 TABLET, FILM COATED ORAL EVERY 6 HOURS
Refills: 0 | Status: DISCONTINUED | OUTPATIENT
Start: 2019-07-22 | End: 2019-08-02

## 2019-07-22 RX ORDER — MAGNESIUM HYDROXIDE 400 MG/1
30 TABLET, CHEWABLE ORAL EVERY 12 HOURS
Refills: 0 | Status: DISCONTINUED | OUTPATIENT
Start: 2019-07-22 | End: 2019-08-02

## 2019-07-22 RX ORDER — GLUCAGON INJECTION, SOLUTION 0.5 MG/.1ML
1 INJECTION, SOLUTION SUBCUTANEOUS ONCE
Refills: 0 | Status: DISCONTINUED | OUTPATIENT
Start: 2019-07-22 | End: 2019-08-02

## 2019-07-22 RX ORDER — NALOXONE HYDROCHLORIDE 4 MG/.1ML
0.1 SPRAY NASAL
Refills: 0 | Status: DISCONTINUED | OUTPATIENT
Start: 2019-07-22 | End: 2019-08-02

## 2019-07-22 RX ORDER — CLONAZEPAM 1 MG
1 TABLET ORAL THREE TIMES A DAY
Refills: 0 | Status: DISCONTINUED | OUTPATIENT
Start: 2019-07-22 | End: 2019-07-23

## 2019-07-22 RX ORDER — DEXTROSE 50 % IN WATER 50 %
12.5 SYRINGE (ML) INTRAVENOUS ONCE
Refills: 0 | Status: DISCONTINUED | OUTPATIENT
Start: 2019-07-22 | End: 2019-08-02

## 2019-07-22 RX ORDER — BUPIVACAINE 13.3 MG/ML
20 INJECTION, SUSPENSION, LIPOSOMAL INFILTRATION ONCE
Refills: 0 | Status: DISCONTINUED | OUTPATIENT
Start: 2019-07-22 | End: 2019-07-22

## 2019-07-22 RX ORDER — ENOXAPARIN SODIUM 100 MG/ML
40 INJECTION SUBCUTANEOUS AT BEDTIME
Refills: 0 | Status: DISCONTINUED | OUTPATIENT
Start: 2019-07-23 | End: 2019-08-02

## 2019-07-22 RX ORDER — FAMOTIDINE 10 MG/ML
20 INJECTION INTRAVENOUS EVERY 12 HOURS
Refills: 0 | Status: COMPLETED | OUTPATIENT
Start: 2019-07-22 | End: 2019-07-27

## 2019-07-22 RX ORDER — DEXTROSE 50 % IN WATER 50 %
15 SYRINGE (ML) INTRAVENOUS ONCE
Refills: 0 | Status: DISCONTINUED | OUTPATIENT
Start: 2019-07-22 | End: 2019-08-02

## 2019-07-22 RX ORDER — SERTRALINE 25 MG/1
125 TABLET, FILM COATED ORAL DAILY
Refills: 0 | Status: DISCONTINUED | OUTPATIENT
Start: 2019-07-22 | End: 2019-08-02

## 2019-07-22 RX ORDER — INSULIN LISPRO 100/ML
VIAL (ML) SUBCUTANEOUS
Refills: 0 | Status: DISCONTINUED | OUTPATIENT
Start: 2019-07-22 | End: 2019-07-29

## 2019-07-22 RX ORDER — SODIUM CHLORIDE 9 MG/ML
1000 INJECTION, SOLUTION INTRAVENOUS
Refills: 0 | Status: DISCONTINUED | OUTPATIENT
Start: 2019-07-22 | End: 2019-08-02

## 2019-07-22 RX ORDER — ACETAMINOPHEN 500 MG
1000 TABLET ORAL ONCE
Refills: 0 | Status: COMPLETED | OUTPATIENT
Start: 2019-07-22 | End: 2019-07-22

## 2019-07-22 RX ADMIN — HYDROMORPHONE HYDROCHLORIDE 0.5 MILLIGRAM(S): 2 INJECTION INTRAMUSCULAR; INTRAVENOUS; SUBCUTANEOUS at 16:08

## 2019-07-22 RX ADMIN — Medication 1 DROP(S): at 17:58

## 2019-07-22 RX ADMIN — Medication 4 MILLIGRAM(S): at 17:43

## 2019-07-22 RX ADMIN — FAMOTIDINE 20 MILLIGRAM(S): 10 INJECTION INTRAVENOUS at 17:58

## 2019-07-22 RX ADMIN — Medication 5 MILLIGRAM(S): at 22:33

## 2019-07-22 RX ADMIN — SENNA PLUS 2 TABLET(S): 8.6 TABLET ORAL at 22:33

## 2019-07-22 RX ADMIN — HYDROMORPHONE HYDROCHLORIDE 0.5 MILLIGRAM(S): 2 INJECTION INTRAMUSCULAR; INTRAVENOUS; SUBCUTANEOUS at 16:06

## 2019-07-22 RX ADMIN — Medication 1000 MILLIGRAM(S): at 23:05

## 2019-07-22 RX ADMIN — HYDROMORPHONE HYDROCHLORIDE 30 MILLILITER(S): 2 INJECTION INTRAMUSCULAR; INTRAVENOUS; SUBCUTANEOUS at 16:59

## 2019-07-22 RX ADMIN — SODIUM CHLORIDE 80 MILLILITER(S): 9 INJECTION, SOLUTION INTRAVENOUS at 17:44

## 2019-07-22 RX ADMIN — Medication 100 MILLIGRAM(S): at 22:33

## 2019-07-22 RX ADMIN — Medication 25 MILLIGRAM(S): at 22:33

## 2019-07-22 RX ADMIN — HYDROMORPHONE HYDROCHLORIDE 0.5 MILLIGRAM(S): 2 INJECTION INTRAMUSCULAR; INTRAVENOUS; SUBCUTANEOUS at 16:40

## 2019-07-22 RX ADMIN — Medication 400 MILLIGRAM(S): at 22:32

## 2019-07-22 RX ADMIN — HYDROMORPHONE HYDROCHLORIDE 0.5 MILLIGRAM(S): 2 INJECTION INTRAMUSCULAR; INTRAVENOUS; SUBCUTANEOUS at 16:43

## 2019-07-22 NOTE — H&P ADULT - NSICDXPASTSURGICALHX_GEN_ALL_CORE_FT
PAST SURGICAL HISTORY:  History of back surgery x4    History of lumbar fusion     History of neck surgery

## 2019-07-22 NOTE — H&P ADULT - NSHPPHYSICALEXAM_GEN_ALL_CORE
AA&OX3, NAD, coherent speech  CNs II-XII grossly intact  motor: LLE DF/PF, EHL 5/5,  o/w b/l LE 4/5,   b/l UE 5/5,sensation to LT grossly intact throughout  Card: S1S2 NSR  Pulm: CTA b/l  Abd: soft, non-tender, nondistended

## 2019-07-22 NOTE — PROGRESS NOTE ADULT - SUBJECTIVE AND OBJECTIVE BOX
NEUROSURGERY POST-OP NOTE:    Patient seen and examined in PACU s/p re-exploration of previous spinal fusion, T10-T11 laminectomy, T9-T12 instrumentation and fusion, plastics closure. JPx1, HMVx1. PCA in place.    HPI:      Hospital course:   POD#0: s/p re-exploration of previous spinal fusion, T10-T11 laminectomy, T9-T12 instrumentation and fusion, plastics closure.    Vital Signs Last 24 Hrs  T(C): --  T(F): --  HR: 94 (22 Jul 2019 16:08) (94 - 100)  BP: 147/73 (22 Jul 2019 16:08) (136/70 - 150/70)  BP(mean): 91 (22 Jul 2019 16:08) (90 - 92)  RR: 12 (22 Jul 2019 16:08) (12 - 17)  SpO2: 100% (22 Jul 2019 16:08) (100% - 100%)    I&O's Summary    22 Jul 2019 07:01  -  22 Jul 2019 16:43  --------------------------------------------------------  IN: 0 mL / OUT: 125 mL / NET: -125 mL        PHYSICAL EXAM:  NEURO:  AAOx3, NAD, coherent speech, following commands  CNII-XII grossly intact  MAEx4, strength 4/5 LE b/l, 5/5 UE b/l, no drift  SILT throughout b/l  Incision/wound: thoracic incision clean, dry and intact; + dressing in place  +KEVIN (superficial)  +HMV (deep)      TUBES/LINES:  [x] Ybarra  [x] A-line  [] Lumbar Drain  [x] Wound Drains  -KEVIN (superficial)  -HMV (deep)  [] NGT   [] EVD   [] CVC  [] Other      DIET:  [x] NPO  [] Mechanical  [] Tube feeds    LABS:                        10.1   13.00 )-----------( 218      ( 22 Jul 2019 16:19 )             31.6                   CAPILLARY BLOOD GLUCOSE      POCT Blood Glucose.: 154 mg/dL (22 Jul 2019 14:49)      Drug Levels: [] N/A    CSF Analysis: [] N/A      Allergies    iodine (Unknown)    Intolerances        Home Medications:  Calcium 600+D oral tablet:  (22 Jul 2019 07:01)  CeleBREX 200 mg oral capsule:  (22 Jul 2019 07:01)  KlonoPIN 1 mg oral tablet: 1 tab(s) orally 3 times a day (22 Jul 2019 07:01)  morphine 30 mg oral capsule:  (22 Jul 2019 07:01)  oxyCODONE-acetaminophen 10 mg-325 mg oral tablet: 1 tab(s) orally every 6 hours (22 Jul 2019 07:01)  traZODone 50 mg oral tablet: 0.5  orally (22 Jul 2019 07:01)  Zoloft: orally once a day (22 Jul 2019 07:01)      MEDICATIONS:  MEDICATIONS  (STANDING):  acetaminophen  IVPB .. 1000 milliGRAM(s) IV Intermittent once  dexamethasone  Injectable 4 milliGRAM(s) IV Push every 6 hours  dextrose 5%. 1000 milliLiter(s) (50 mL/Hr) IV Continuous <Continuous>  dextrose 50% Injectable 12.5 Gram(s) IV Push once  dextrose 50% Injectable 25 Gram(s) IV Push once  dextrose 50% Injectable 25 Gram(s) IV Push once  diazepam    Tablet 5 milliGRAM(s) Oral every 8 hours  docusate sodium 100 milliGRAM(s) Oral three times a day  famotidine    Tablet 20 milliGRAM(s) Oral every 12 hours  HYDROmorphone PCA (1 mG/mL) 30 milliLiter(s) PCA Continuous PCA Continuous  insulin lispro (HumaLOG) corrective regimen sliding scale   SubCutaneous three times a day before meals  lactated ringers. 1000 milliLiter(s) (80 mL/Hr) IV Continuous <Continuous>  senna 2 Tablet(s) Oral at bedtime  sertraline 125 milliGRAM(s) Oral daily  traZODone 25 milliGRAM(s) Oral at bedtime    MEDICATIONS  (PRN):  clonazePAM  Tablet 1 milliGRAM(s) Oral three times a day PRN anxiety  dextrose 40% Gel 15 Gram(s) Oral once PRN Blood Glucose LESS THAN 70 milliGRAM(s)/deciliter  glucagon  Injectable 1 milliGRAM(s) IntraMuscular once PRN Glucose LESS THAN 70 milligrams/deciliter  HYDROmorphone  Injectable 0.5 milliGRAM(s) IV Push every 30 minutes PRN Severe Pain (7 - 10)  magnesium hydroxide Suspension 30 milliLiter(s) Oral every 12 hours PRN Constipation  naloxone Injectable 0.1 milliGRAM(s) IV Push every 3 minutes PRN For ANY of the following changes in patient status:  A. RR LESS THAN 10 breaths per minute, B. Oxygen saturation LESS THAN 90%, C. Sedation score of 6  ondansetron Injectable 4 milliGRAM(s) IV Push every 6 hours PRN Nausea      CULTURES:      RADIOLOGY & ADDITIONAL TESTS:      ASSESSMENT:  74y Female s/p re-exploration of previous spinal fusion, T10-T11 laminectomy, T9-T12 instrumentation and fusion, plastics closure, POD#1    PLAN:  -neuro/spinal checks  -dilaudid PCA, standing valium   -decadron 4q6 for now, f/u tapering dose   -monitor thoracic wound- plastics closure (Ronit Genao)  -JPx1 (superficial), HMVx1 (deep)- monitor outputs   -SBP normotensive  -room air, satting well  -IVF for now  -advance diet as tolerated  -bowel regimen  -GI ppx  -ISS  -post op abx  -continue home meds  -f/u post op labs  -OOB/PT/OT  -d/w Dr. Retana       Assessment: present when checked     [] GCS   E   V   M     Heart Failure: [] Acute, [] acute on chronic, [] chronic   Heart Failure: [] Diastolic (HFpEF), [] Systolic (HRrEF), [] Combined (HFpEF and HFrEF), [] RHF, [] Pulm HTN, [] Other     [] LUIZA, [] ATN, [] AIN, [] other   [] CKD1, [] CKD2, [] CKD3, [] CKD4, [] CKD5, [] ESRD     Encephalopathy: [] Metabolic, [] Hepatic, [] Toxic, [] Neurological, [] Other     Abnormal Nutritional Status: [] malnutrition (see nutrition note), []underweight: BMI <19, [] morbid obesity: BMI >40, [] Cachexia     [] Sepsis   [] Hypovolemic shock, [] Cardiogenic shock, [] Hemorrhagic shock, [] Neurogenic shock   [] Acute respiratory failure   [] Cerebral edema, [] Brain compression / herniation   [] Functional quadriplegia   [] Acute blood loss anemia

## 2019-07-22 NOTE — PACU DISCHARGE NOTE - COMMENTS
Pain management in progress with PCA- alert and oriented x4- follow commands- moving all extremities- right aide hemovac and left KEVIN drain patent- Ybarra patent and draining- tolerating ice chips and clear liquid- denies N/V- report given to 9 stacia nurse -pt left unit via bed on supplemental oxygen and PCA pump and IVF to 938-1 accompanied by nurse and PCA

## 2019-07-22 NOTE — H&P ADULT - HISTORY OF PRESENT ILLNESS
Taken from Dr. Retana's office note 7/17/2019  "Patient presents to be evaluated for surgical intervention for progressively worsening gait disturbance.  She reports a h/o cervical spine surgery, multiple lumbar spine procedures with the most recent x 3 years ago for scoliosis repair. Since her lower back surgery she has persistent lower back pain and most recent mid upper back pain with onset x 1 year ago.    She has undergone multiple conservative measures ranging from the use of oral pain medications (currently utilizes Percocet and Morphine tabs), NADIR's and as last resort a spinal cord stimulator trial which have all failed at provided lasting symptom relief.    She is off balance and best describes her gait a "walking on a train""

## 2019-07-22 NOTE — CHART NOTE - NSCHARTNOTEFT_GEN_A_CORE
Infectious Diseases Anti-infective Approval Note    Medication:  Cefepime   Dose:  1 gram  Route:  IV  Frequency:  q12  Duration:  2 doses    Dose may be adjusted as needed for alterations in renal function.    *THIS IS NOT AN INFECTIOUS DISEASES CONSULTATION*
Neurosurgical Indications for Screening Dopplers on Admission:       1) Known hypercoagulation disorder (h/o VTE, thrombophilia, HIT, etc.)   2) Admitted from prolonged stay from another institution (straight forward ER transfers not included)  3) Presenting with significant leg immobility   4) Presenting with signs and symptoms of VTE?    5) With significant critical illness, Including "found down" for unknown period of time in HPI  6) With significant neurotrauma (TBI, SCI / TLS spine fractures)   7) Who are comatose   8) With known malignancy (e.g. glioblastoma multiforme, meningioma, etc.). Excludes IA chemo 23hr observation stays  9) On hemodialysis   10) Who have received platelet transfusion or antithrombotic reversal agents recently   11) Who have had recent major orthopedic surgery          Screening dopplers indicated?   Y x  N _    DVT Prophylaxis:  x SCD's   _ chemoprophylaxis

## 2019-07-22 NOTE — BRIEF OPERATIVE NOTE - OPERATION/FINDINGS
re-exploration of spinal fusion  T10-T11 laminectomy   T9-T12 instrumentation and fusion  plastics closure

## 2019-07-23 LAB
ANION GAP SERPL CALC-SCNC: 9 MMOL/L — SIGNIFICANT CHANGE UP (ref 5–17)
BASOPHILS # BLD AUTO: 0.01 K/UL — SIGNIFICANT CHANGE UP (ref 0–0.2)
BASOPHILS NFR BLD AUTO: 0.1 % — SIGNIFICANT CHANGE UP (ref 0–2)
BUN SERPL-MCNC: 13 MG/DL — SIGNIFICANT CHANGE UP (ref 7–23)
CALCIUM SERPL-MCNC: 8.3 MG/DL — LOW (ref 8.4–10.5)
CHLORIDE SERPL-SCNC: 101 MMOL/L — SIGNIFICANT CHANGE UP (ref 96–108)
CO2 SERPL-SCNC: 25 MMOL/L — SIGNIFICANT CHANGE UP (ref 22–31)
CREAT SERPL-MCNC: 0.58 MG/DL — SIGNIFICANT CHANGE UP (ref 0.5–1.3)
EOSINOPHIL # BLD AUTO: 0 K/UL — SIGNIFICANT CHANGE UP (ref 0–0.5)
EOSINOPHIL NFR BLD AUTO: 0 % — SIGNIFICANT CHANGE UP (ref 0–6)
GLUCOSE BLDC GLUCOMTR-MCNC: 121 MG/DL — HIGH (ref 70–99)
GLUCOSE BLDC GLUCOMTR-MCNC: 125 MG/DL — HIGH (ref 70–99)
GLUCOSE BLDC GLUCOMTR-MCNC: 129 MG/DL — HIGH (ref 70–99)
GLUCOSE BLDC GLUCOMTR-MCNC: 144 MG/DL — HIGH (ref 70–99)
GLUCOSE SERPL-MCNC: 125 MG/DL — HIGH (ref 70–99)
HBA1C BLD-MCNC: 5.3 % — SIGNIFICANT CHANGE UP (ref 4–5.6)
HCT VFR BLD CALC: 26.9 % — LOW (ref 34.5–45)
HCV AB S/CO SERPL IA: 0.15 S/CO — SIGNIFICANT CHANGE UP
HCV AB SERPL-IMP: SIGNIFICANT CHANGE UP
HGB BLD-MCNC: 8.4 G/DL — LOW (ref 11.5–15.5)
IMM GRANULOCYTES NFR BLD AUTO: 0.6 % — SIGNIFICANT CHANGE UP (ref 0–1.5)
LYMPHOCYTES # BLD AUTO: 0.82 K/UL — LOW (ref 1–3.3)
LYMPHOCYTES # BLD AUTO: 5.7 % — LOW (ref 13–44)
MAGNESIUM SERPL-MCNC: 1.8 MG/DL — SIGNIFICANT CHANGE UP (ref 1.6–2.6)
MCHC RBC-ENTMCNC: 29.1 PG — SIGNIFICANT CHANGE UP (ref 27–34)
MCHC RBC-ENTMCNC: 31.2 GM/DL — LOW (ref 32–36)
MCV RBC AUTO: 93.1 FL — SIGNIFICANT CHANGE UP (ref 80–100)
MONOCYTES # BLD AUTO: 0.59 K/UL — SIGNIFICANT CHANGE UP (ref 0–0.9)
MONOCYTES NFR BLD AUTO: 4.1 % — SIGNIFICANT CHANGE UP (ref 2–14)
NEUTROPHILS # BLD AUTO: 12.98 K/UL — HIGH (ref 1.8–7.4)
NEUTROPHILS NFR BLD AUTO: 89.5 % — HIGH (ref 43–77)
NRBC # BLD: 0 /100 WBCS — SIGNIFICANT CHANGE UP (ref 0–0)
PHOSPHATE SERPL-MCNC: 2.8 MG/DL — SIGNIFICANT CHANGE UP (ref 2.5–4.5)
PLATELET # BLD AUTO: 185 K/UL — SIGNIFICANT CHANGE UP (ref 150–400)
POTASSIUM SERPL-MCNC: 4.2 MMOL/L — SIGNIFICANT CHANGE UP (ref 3.5–5.3)
POTASSIUM SERPL-SCNC: 4.2 MMOL/L — SIGNIFICANT CHANGE UP (ref 3.5–5.3)
RBC # BLD: 2.89 M/UL — LOW (ref 3.8–5.2)
RBC # FLD: 14.4 % — SIGNIFICANT CHANGE UP (ref 10.3–14.5)
SODIUM SERPL-SCNC: 135 MMOL/L — SIGNIFICANT CHANGE UP (ref 135–145)
WBC # BLD: 14.49 K/UL — HIGH (ref 3.8–10.5)
WBC # FLD AUTO: 14.49 K/UL — HIGH (ref 3.8–10.5)

## 2019-07-23 RX ORDER — MORPHINE SULFATE 50 MG/1
30 CAPSULE, EXTENDED RELEASE ORAL EVERY 4 HOURS
Refills: 0 | Status: DISCONTINUED | OUTPATIENT
Start: 2019-07-23 | End: 2019-07-30

## 2019-07-23 RX ORDER — MORPHINE SULFATE 50 MG/1
15 CAPSULE, EXTENDED RELEASE ORAL EVERY 4 HOURS
Refills: 0 | Status: DISCONTINUED | OUTPATIENT
Start: 2019-07-23 | End: 2019-07-30

## 2019-07-23 RX ORDER — MORPHINE SULFATE 50 MG/1
4 CAPSULE, EXTENDED RELEASE ORAL EVERY 4 HOURS
Refills: 0 | Status: DISCONTINUED | OUTPATIENT
Start: 2019-07-23 | End: 2019-07-30

## 2019-07-23 RX ADMIN — FAMOTIDINE 20 MILLIGRAM(S): 10 INJECTION INTRAVENOUS at 18:04

## 2019-07-23 RX ADMIN — Medication 5 MILLIGRAM(S): at 05:43

## 2019-07-23 RX ADMIN — SENNA PLUS 2 TABLET(S): 8.6 TABLET ORAL at 21:46

## 2019-07-23 RX ADMIN — MORPHINE SULFATE 15 MILLIGRAM(S): 50 CAPSULE, EXTENDED RELEASE ORAL at 18:14

## 2019-07-23 RX ADMIN — Medication 100 MILLIGRAM(S): at 21:47

## 2019-07-23 RX ADMIN — Medication 100 MILLIGRAM(S): at 13:36

## 2019-07-23 RX ADMIN — Medication 4 MILLIGRAM(S): at 01:11

## 2019-07-23 RX ADMIN — ONDANSETRON 4 MILLIGRAM(S): 8 TABLET, FILM COATED ORAL at 16:48

## 2019-07-23 RX ADMIN — Medication 5 MILLIGRAM(S): at 21:47

## 2019-07-23 RX ADMIN — Medication 100 MILLIGRAM(S): at 05:43

## 2019-07-23 RX ADMIN — CEFEPIME 100 MILLIGRAM(S): 1 INJECTION, POWDER, FOR SOLUTION INTRAMUSCULAR; INTRAVENOUS at 01:12

## 2019-07-23 RX ADMIN — Medication 1 DROP(S): at 12:43

## 2019-07-23 RX ADMIN — MORPHINE SULFATE 4 MILLIGRAM(S): 50 CAPSULE, EXTENDED RELEASE ORAL at 21:10

## 2019-07-23 RX ADMIN — Medication 1 MILLIGRAM(S): at 04:32

## 2019-07-23 RX ADMIN — Medication 4 MILLIGRAM(S): at 18:04

## 2019-07-23 RX ADMIN — CEFEPIME 100 MILLIGRAM(S): 1 INJECTION, POWDER, FOR SOLUTION INTRAMUSCULAR; INTRAVENOUS at 13:52

## 2019-07-23 RX ADMIN — SERTRALINE 125 MILLIGRAM(S): 25 TABLET, FILM COATED ORAL at 12:42

## 2019-07-23 RX ADMIN — MORPHINE SULFATE 4 MILLIGRAM(S): 50 CAPSULE, EXTENDED RELEASE ORAL at 20:40

## 2019-07-23 RX ADMIN — Medication 1 TABLET(S): at 12:42

## 2019-07-23 RX ADMIN — ENOXAPARIN SODIUM 40 MILLIGRAM(S): 100 INJECTION SUBCUTANEOUS at 21:46

## 2019-07-23 RX ADMIN — Medication 4 MILLIGRAM(S): at 05:43

## 2019-07-23 RX ADMIN — Medication 5 MILLIGRAM(S): at 13:36

## 2019-07-23 RX ADMIN — FAMOTIDINE 20 MILLIGRAM(S): 10 INJECTION INTRAVENOUS at 05:43

## 2019-07-23 RX ADMIN — Medication 4 MILLIGRAM(S): at 12:42

## 2019-07-23 RX ADMIN — SODIUM CHLORIDE 80 MILLILITER(S): 9 INJECTION, SOLUTION INTRAVENOUS at 11:20

## 2019-07-23 RX ADMIN — Medication 25 MILLIGRAM(S): at 21:47

## 2019-07-23 RX ADMIN — MORPHINE SULFATE 15 MILLIGRAM(S): 50 CAPSULE, EXTENDED RELEASE ORAL at 19:17

## 2019-07-23 NOTE — OCCUPATIONAL THERAPY INITIAL EVALUATION ADULT - PLANNED THERAPY INTERVENTIONS, OT EVAL
balance training/motor coordination training/neuromuscular re-education/ADL retraining/bed mobility training/strengthening/transfer training

## 2019-07-23 NOTE — PROGRESS NOTE ADULT - SUBJECTIVE AND OBJECTIVE BOX
Neurology Follow up note    Name  CARRIE العراقي    HPI:  Taken from Dr. Retana's office note 7/17/2019  "Patient presents to be evaluated for surgical intervention for progressively worsening gait disturbance.  She reports a h/o cervical spine surgery, multiple lumbar spine procedures with the most recent x 3 years ago for scoliosis repair. Since her lower back surgery she has persistent lower back pain and most recent mid upper back pain with onset x 1 year ago.    She has undergone multiple conservative measures ranging from the use of oral pain medications (currently utilizes Percocet and Morphine tabs), NADIR's and as last resort a spinal cord stimulator trial which have all failed at provided lasting symptom relief.    She is off balance and best describes her gait a "walking on a train"" (22 Jul 2019 16:38)      Interval History - back pain and radicular symptoms in the LE      REVIEW OF SYSTEMS    Vital Signs Last 24 Hrs  T(C): 37.4 (23 Jul 2019 05:25), Max: 37.4 (23 Jul 2019 05:25)  T(F): 99.3 (23 Jul 2019 05:25), Max: 99.3 (23 Jul 2019 05:25)  HR: 104 (23 Jul 2019 09:59) (84 - 110)  BP: 140/67 (23 Jul 2019 09:59) (119/67 - 156/71)  BP(mean): 106 (22 Jul 2019 18:43) (76 - 106)  RR: 17 (23 Jul 2019 05:49) (12 - 24)  SpO2: 100% (23 Jul 2019 05:49) (98% - 100%)    Physical Exam-     Mental Status- awake and alert     Cranial Nerves- full EOM    Gait and station- n/a    Motor- moves all 4 extremities     Reflexes- decreased     Sensation- no sensory level    Coordination- no tremors    Vascular - no bruits    Medications  artificial  tears Solution 1 Drop(s) Both EYES daily  calcium carbonate 1250 mG  + Vitamin D (OsCal 500 + D) 1 Tablet(s) Oral daily  cefepime   IVPB 1000 milliGRAM(s) IV Intermittent every 12 hours  clonazePAM  Tablet 1 milliGRAM(s) Oral three times a day PRN  dexamethasone  Injectable 4 milliGRAM(s) IV Push every 6 hours  dextrose 40% Gel 15 Gram(s) Oral once PRN  dextrose 5%. 1000 milliLiter(s) IV Continuous <Continuous>  dextrose 50% Injectable 12.5 Gram(s) IV Push once  dextrose 50% Injectable 25 Gram(s) IV Push once  dextrose 50% Injectable 25 Gram(s) IV Push once  diazepam    Tablet 5 milliGRAM(s) Oral every 8 hours  docusate sodium 100 milliGRAM(s) Oral three times a day  enoxaparin Injectable 40 milliGRAM(s) SubCutaneous at bedtime  famotidine    Tablet 20 milliGRAM(s) Oral every 12 hours  glucagon  Injectable 1 milliGRAM(s) IntraMuscular once PRN  HYDROmorphone  Injectable 0.5 milliGRAM(s) IV Push every 30 minutes PRN  HYDROmorphone PCA (1 mG/mL) 30 milliLiter(s) PCA Continuous PCA Continuous  insulin lispro (HumaLOG) corrective regimen sliding scale   SubCutaneous Before meals and at bedtime  lactated ringers. 1000 milliLiter(s) IV Continuous <Continuous>  magnesium hydroxide Suspension 30 milliLiter(s) Oral every 12 hours PRN  naloxone Injectable 0.1 milliGRAM(s) IV Push every 3 minutes PRN  ondansetron Injectable 4 milliGRAM(s) IV Push every 6 hours PRN  senna 2 Tablet(s) Oral at bedtime  sertraline 125 milliGRAM(s) Oral daily  traZODone 25 milliGRAM(s) Oral at bedtime      Lab      Radiology    Assessment- Lumbar radiculopathy    Plan as per NS

## 2019-07-23 NOTE — PROGRESS NOTE ADULT - SUBJECTIVE AND OBJECTIVE BOX
Pt seen and examined.     Exam:  Surgical site c/d/i. No fluctuance or drainage.   Drains 450 & 55mL/24 hours

## 2019-07-23 NOTE — PROGRESS NOTE ADULT - SUBJECTIVE AND OBJECTIVE BOX
HPI:  Taken from Dr. Retana's office note 7/17/2019  "Patient presents to be evaluated for surgical intervention for progressively worsening gait disturbance.  She reports a h/o cervical spine surgery, multiple lumbar spine procedures with the most recent x 3 years ago for scoliosis repair. Since her lower back surgery she has persistent lower back pain and most recent mid upper back pain with onset x 1 year ago.    She has undergone multiple conservative measures ranging from the use of oral pain medications (currently utilizes Percocet and Morphine tabs), NADIR's and as last resort a spinal cord stimulator trial which have all failed at provided lasting symptom relief.    She is off balance and best describes her gait a "walking on a train"" (22 Jul 2019 16:38)    POD#0: s/p T10-11 lami, T9-12 fusion.  No overnight events    OVERNIGHT EVENTS:  Vital Signs Last 24 Hrs  T(C): 37.4 (23 Jul 2019 05:25), Max: 37.4 (23 Jul 2019 05:25)  T(F): 99.3 (23 Jul 2019 05:25), Max: 99.3 (23 Jul 2019 05:25)  HR: 93 (23 Jul 2019 05:49) (84 - 106)  BP: 156/71 (23 Jul 2019 05:49) (119/67 - 156/71)  BP(mean): 106 (22 Jul 2019 18:43) (76 - 106)  RR: 17 (23 Jul 2019 05:49) (12 - 24)  SpO2: 100% (23 Jul 2019 05:49) (98% - 100%)    I&O's Summary    22 Jul 2019 07:01  -  23 Jul 2019 06:59  --------------------------------------------------------  IN: 1030 mL / OUT: 1065 mL / NET: -35 mL        PHYSICAL EXAM:  NEURO:  AAOx3, NAD, coherent speech, following commands  CNII-XII grossly intact  MAEx4, strength 4/5 LE b/l, 5/5 UE b/l, no drift  SILT throughout b/l  Incision/wound: thoracic incision clean, dry and intact; + dressing in place  +KEVIN (superficial)  +HMV (deep)  Others      DIET:  [] NPO  [x] Mechanical  [] Tube feeds    LABS:                        10.1   13.00 )-----------( 218      ( 22 Jul 2019 16:19 )             31.6     07-22    133<L>  |  101  |  11  ----------------------------<  193<H>  3.5   |  23  |  0.56    Ca    7.9<L>      22 Jul 2019 16:19              CAPILLARY BLOOD GLUCOSE      POCT Blood Glucose.: 121 mg/dL (23 Jul 2019 06:31)  POCT Blood Glucose.: 142 mg/dL (22 Jul 2019 21:18)  POCT Blood Glucose.: 154 mg/dL (22 Jul 2019 14:49)      Drug Levels: [] N/A    CSF Analysis: [] N/A      Allergies    iodine (Unknown)    Intolerances      MEDICATIONS:  Antibiotics:  cefepime   IVPB 1000 milliGRAM(s) IV Intermittent every 12 hours    Neuro:  clonazePAM  Tablet 1 milliGRAM(s) Oral three times a day PRN  diazepam    Tablet 5 milliGRAM(s) Oral every 8 hours  HYDROmorphone  Injectable 0.5 milliGRAM(s) IV Push every 30 minutes PRN  HYDROmorphone PCA (1 mG/mL) 30 milliLiter(s) PCA Continuous PCA Continuous  ondansetron Injectable 4 milliGRAM(s) IV Push every 6 hours PRN  sertraline 125 milliGRAM(s) Oral daily  traZODone 25 milliGRAM(s) Oral at bedtime    Anticoagulation:  enoxaparin Injectable 40 milliGRAM(s) SubCutaneous at bedtime    OTHER:  artificial  tears Solution 1 Drop(s) Both EYES daily  dexamethasone  Injectable 4 milliGRAM(s) IV Push every 6 hours  dextrose 40% Gel 15 Gram(s) Oral once PRN  dextrose 50% Injectable 12.5 Gram(s) IV Push once  dextrose 50% Injectable 25 Gram(s) IV Push once  dextrose 50% Injectable 25 Gram(s) IV Push once  docusate sodium 100 milliGRAM(s) Oral three times a day  famotidine    Tablet 20 milliGRAM(s) Oral every 12 hours  glucagon  Injectable 1 milliGRAM(s) IntraMuscular once PRN  insulin lispro (HumaLOG) corrective regimen sliding scale   SubCutaneous Before meals and at bedtime  magnesium hydroxide Suspension 30 milliLiter(s) Oral every 12 hours PRN  naloxone Injectable 0.1 milliGRAM(s) IV Push every 3 minutes PRN  senna 2 Tablet(s) Oral at bedtime    IVF:  calcium carbonate 1250 mG  + Vitamin D (OsCal 500 + D) 1 Tablet(s) Oral daily  dextrose 5%. 1000 milliLiter(s) IV Continuous <Continuous>  lactated ringers. 1000 milliLiter(s) IV Continuous <Continuous>    CULTURES:    RADIOLOGY & ADDITIONAL TESTS:      ASSESSMENT:  74y Female s/p    M47.14  Handoff  MEWS Score  Anxiety  Depression  Osteoporosis  Suspected deep vein thrombosis (DVT)  Back pain  History of neck surgery  History of back surgery  History of lumbar fusion      PLAN:  -neuro/spinal checks  -dilaudid PCA, standing valium   -decadron 4q6 for now, f/u tapering dose   -monitor thoracic wound- plastics closure (Ronit Genao)  -JPx1 (superficial), HMVx1 (deep)- monitor outputs   -SBP normotensive  -room air, satting well  -IVF for now  -advance diet as tolerated  -bowel regimen  -GI ppx  -ISS  -post op abx  -continue home meds  -OOB/PT/OT  -d/w Dr. Retana     Assessment:  Present when checked    []  GCS  E   V  M     Heart Failure: []Acute, [] acute on chronic , []chronic  Heart Failure:  [] Diastolic (HFpEF), [] Systolic (HFrEF), []Combined (HFpEF and HFrEF), [] RHF, [] Pulm HTN, [] Other    [] LUIZA, [] ATN, [] AIN, [] other  [] CKD1, [] CKD2, [] CKD 3, [] CKD 4, [] CKD 5, []ESRD    Encephalopathy: [] Metabolic, [] Hepatic, [] toxic, [] Neurological, [] Other    Abnormal Nurtitional Status: [] malnurtition (see nutrition note), [ ]underweight: BMI < 19, [] morbid obesity: BMI >40, [] Cachexia    [] Sepsis  [] hypovolemic shock,[] cardiogenic shock, [] hemorrhagic shock, [] neuogenic shock  [] Acute Respiratory Failure  []Cerebral edema, [] Brain compression/ herniation,   [] Functional quadriplegia  [] Acute blood loss anemia

## 2019-07-23 NOTE — OCCUPATIONAL THERAPY INITIAL EVALUATION ADULT - STANDING BALANCE: DYNAMIC, REHAB EVAL
fair minus/Patient required CG assistance w/ RW for stability to ambulate approximately 10 ft. Patient c/o pain in stomach and increased pain in back limiting further functional mobility.

## 2019-07-23 NOTE — OCCUPATIONAL THERAPY INITIAL EVALUATION ADULT - MD ORDER
Per chart, Per chart, Patient presents to be evaluated for surgical intervention for progressively worsening gait disturbance. She reports a h/o cervical spine surgery, multiple lumbar spine procedures with the most recent x 3 years ago for scoliosis repair. Since her lower back surgery she has persistent lower back pain and most recent mid upper back pain with onset x1 year ago. Patient s/p T10-11 lami & T9-12 fusion 7/22/19

## 2019-07-23 NOTE — PHYSICAL THERAPY INITIAL EVALUATION ADULT - CRITERIA FOR SKILLED THERAPEUTIC INTERVENTIONS
therapy frequency/anticipated equipment needs at discharge/impairments found/functional limitations in following categories/risk reduction/prevention/anticipated discharge recommendation/rehab potential

## 2019-07-23 NOTE — OCCUPATIONAL THERAPY INITIAL EVALUATION ADULT - GENERAL OBSERVATIONS, REHAB EVAL
R hand dominant, patient cleared for OT by BIANCA Chinchilla. Patient received semi-supine, c/o 8/10 pain in back, +IV, +morris, +tele, +SCDs, agreeable to OT R hand dominant, patient cleared for OT by BIANCA Chinchilla. Patient received semi-supine, c/o 8/10 pain in back, +IV, +morris, +tele, +SCDs, +hemovac, +KEVIN drain, agreeable to OT

## 2019-07-23 NOTE — PHYSICAL THERAPY INITIAL EVALUATION ADULT - DID THE PATIENT HAVE SURGERY?
re-exploration of spinal fusion; T10-T11 laminectomy; T9-T12 instrumentation and fusion plastics closure/yes

## 2019-07-23 NOTE — PHYSICAL THERAPY INITIAL EVALUATION ADULT - GENERAL OBSERVATIONS, REHAB EVAL
received supine complaints of incisional back pain 8/10 +EKG, IV hep, PCA, morris, hemovac, KEVIN. left in chair +RN Renée/Tsering llanes, call dye

## 2019-07-23 NOTE — OCCUPATIONAL THERAPY INITIAL EVALUATION ADULT - ADDITIONAL COMMENTS
Patient reports being independent in functional mobility and ADLs PTA w/ occasional use of SC. Patient states she leaves SC in car when she goes out for IADLs.

## 2019-07-23 NOTE — PHYSICAL THERAPY INITIAL EVALUATION ADULT - PERTINENT HX OF CURRENT PROBLEM, REHAB EVAL
74F  reports a h/o cervical spine surgery, multiple lumbar spine procedures with the most recent x 3 years ago for scoliosis repair. Since her lower back surgery she has persistent lower back pain and most recent mid upper back pain with onset x 1 year ago.  She is off balance and best describes her gait as "walking on a train""

## 2019-07-23 NOTE — PHYSICAL THERAPY INITIAL EVALUATION ADULT - GAIT DEVIATIONS NOTED, PT EVAL
decreased step length/decreased weight-shifting ability/HR up to 122 bpm, further ambulation not appropriate/decreased luis carlos

## 2019-07-23 NOTE — PHYSICAL THERAPY INITIAL EVALUATION ADULT - ADDITIONAL COMMENTS
lives in a house, fewer steps to enter house via garage, 1 flight inside home, no falls, lives in a house, fewer steps to enter house via garage, 1 flight inside home, no falls, able to ambulate outdoors with one block

## 2019-07-23 NOTE — PROGRESS NOTE ADULT - ASSESSMENT
A/P: Pt doing well POD#1 s/p plastic surgery closure of back.   1. f/u drain output  2. Change Aquacel on POD#3  3. HV drain per Spine surgery; KEVIN drain to remain in until less than 20mL/24 hours.   4. Sutures to remain in for 4 weeks

## 2019-07-24 LAB
ANION GAP SERPL CALC-SCNC: 10 MMOL/L — SIGNIFICANT CHANGE UP (ref 5–17)
BUN SERPL-MCNC: 10 MG/DL — SIGNIFICANT CHANGE UP (ref 7–23)
CALCIUM SERPL-MCNC: 8.8 MG/DL — SIGNIFICANT CHANGE UP (ref 8.4–10.5)
CHLORIDE SERPL-SCNC: 102 MMOL/L — SIGNIFICANT CHANGE UP (ref 96–108)
CO2 SERPL-SCNC: 28 MMOL/L — SIGNIFICANT CHANGE UP (ref 22–31)
CREAT SERPL-MCNC: 0.56 MG/DL — SIGNIFICANT CHANGE UP (ref 0.5–1.3)
GLUCOSE BLDC GLUCOMTR-MCNC: 121 MG/DL — HIGH (ref 70–99)
GLUCOSE BLDC GLUCOMTR-MCNC: 122 MG/DL — HIGH (ref 70–99)
GLUCOSE BLDC GLUCOMTR-MCNC: 153 MG/DL — HIGH (ref 70–99)
GLUCOSE BLDC GLUCOMTR-MCNC: 95 MG/DL — SIGNIFICANT CHANGE UP (ref 70–99)
GLUCOSE SERPL-MCNC: 121 MG/DL — HIGH (ref 70–99)
HCT VFR BLD CALC: 28.2 % — LOW (ref 34.5–45)
HGB BLD-MCNC: 8.9 G/DL — LOW (ref 11.5–15.5)
MAGNESIUM SERPL-MCNC: 2.1 MG/DL — SIGNIFICANT CHANGE UP (ref 1.6–2.6)
MCHC RBC-ENTMCNC: 28.4 PG — SIGNIFICANT CHANGE UP (ref 27–34)
MCHC RBC-ENTMCNC: 31.6 GM/DL — LOW (ref 32–36)
MCV RBC AUTO: 90.1 FL — SIGNIFICANT CHANGE UP (ref 80–100)
NRBC # BLD: 0 /100 WBCS — SIGNIFICANT CHANGE UP (ref 0–0)
PHOSPHATE SERPL-MCNC: 2.6 MG/DL — SIGNIFICANT CHANGE UP (ref 2.5–4.5)
PLATELET # BLD AUTO: 219 K/UL — SIGNIFICANT CHANGE UP (ref 150–400)
POTASSIUM SERPL-MCNC: 4 MMOL/L — SIGNIFICANT CHANGE UP (ref 3.5–5.3)
POTASSIUM SERPL-SCNC: 4 MMOL/L — SIGNIFICANT CHANGE UP (ref 3.5–5.3)
RBC # BLD: 3.13 M/UL — LOW (ref 3.8–5.2)
RBC # FLD: 14.6 % — HIGH (ref 10.3–14.5)
SODIUM SERPL-SCNC: 140 MMOL/L — SIGNIFICANT CHANGE UP (ref 135–145)
WBC # BLD: 14.93 K/UL — HIGH (ref 3.8–10.5)
WBC # FLD AUTO: 14.93 K/UL — HIGH (ref 3.8–10.5)

## 2019-07-24 PROCEDURE — 93970 EXTREMITY STUDY: CPT | Mod: 26

## 2019-07-24 RX ORDER — POTASSIUM PHOSPHATE, MONOBASIC POTASSIUM PHOSPHATE, DIBASIC 236; 224 MG/ML; MG/ML
15 INJECTION, SOLUTION INTRAVENOUS ONCE
Refills: 0 | Status: COMPLETED | OUTPATIENT
Start: 2019-07-24 | End: 2019-07-24

## 2019-07-24 RX ORDER — ACETAMINOPHEN 500 MG
650 TABLET ORAL EVERY 6 HOURS
Refills: 0 | Status: DISCONTINUED | OUTPATIENT
Start: 2019-07-24 | End: 2019-08-02

## 2019-07-24 RX ADMIN — Medication 650 MILLIGRAM(S): at 08:10

## 2019-07-24 RX ADMIN — MORPHINE SULFATE 30 MILLIGRAM(S): 50 CAPSULE, EXTENDED RELEASE ORAL at 14:30

## 2019-07-24 RX ADMIN — SENNA PLUS 2 TABLET(S): 8.6 TABLET ORAL at 21:28

## 2019-07-24 RX ADMIN — MORPHINE SULFATE 30 MILLIGRAM(S): 50 CAPSULE, EXTENDED RELEASE ORAL at 13:59

## 2019-07-24 RX ADMIN — Medication 5 MILLIGRAM(S): at 14:04

## 2019-07-24 RX ADMIN — Medication 5 MILLIGRAM(S): at 05:58

## 2019-07-24 RX ADMIN — FAMOTIDINE 20 MILLIGRAM(S): 10 INJECTION INTRAVENOUS at 17:31

## 2019-07-24 RX ADMIN — Medication 100 MILLIGRAM(S): at 21:28

## 2019-07-24 RX ADMIN — Medication 5 MILLIGRAM(S): at 21:28

## 2019-07-24 RX ADMIN — MORPHINE SULFATE 30 MILLIGRAM(S): 50 CAPSULE, EXTENDED RELEASE ORAL at 18:52

## 2019-07-24 RX ADMIN — POTASSIUM PHOSPHATE, MONOBASIC POTASSIUM PHOSPHATE, DIBASIC 63.75 MILLIMOLE(S): 236; 224 INJECTION, SOLUTION INTRAVENOUS at 10:40

## 2019-07-24 RX ADMIN — Medication 1 TABLET(S): at 14:01

## 2019-07-24 RX ADMIN — FAMOTIDINE 20 MILLIGRAM(S): 10 INJECTION INTRAVENOUS at 05:58

## 2019-07-24 RX ADMIN — MORPHINE SULFATE 30 MILLIGRAM(S): 50 CAPSULE, EXTENDED RELEASE ORAL at 23:18

## 2019-07-24 RX ADMIN — Medication 4 MILLIGRAM(S): at 14:04

## 2019-07-24 RX ADMIN — Medication 2: at 17:31

## 2019-07-24 RX ADMIN — Medication 4 MILLIGRAM(S): at 05:58

## 2019-07-24 RX ADMIN — Medication 4 MILLIGRAM(S): at 00:21

## 2019-07-24 RX ADMIN — ENOXAPARIN SODIUM 40 MILLIGRAM(S): 100 INJECTION SUBCUTANEOUS at 21:28

## 2019-07-24 RX ADMIN — SERTRALINE 125 MILLIGRAM(S): 25 TABLET, FILM COATED ORAL at 14:01

## 2019-07-24 RX ADMIN — MORPHINE SULFATE 30 MILLIGRAM(S): 50 CAPSULE, EXTENDED RELEASE ORAL at 23:50

## 2019-07-24 RX ADMIN — Medication 1 DROP(S): at 13:58

## 2019-07-24 RX ADMIN — MORPHINE SULFATE 30 MILLIGRAM(S): 50 CAPSULE, EXTENDED RELEASE ORAL at 01:21

## 2019-07-24 RX ADMIN — Medication 25 MILLIGRAM(S): at 21:28

## 2019-07-24 RX ADMIN — SODIUM CHLORIDE 80 MILLILITER(S): 9 INJECTION, SOLUTION INTRAVENOUS at 00:25

## 2019-07-24 RX ADMIN — Medication 4 MILLIGRAM(S): at 21:29

## 2019-07-24 RX ADMIN — Medication 650 MILLIGRAM(S): at 07:39

## 2019-07-24 RX ADMIN — Medication 100 MILLIGRAM(S): at 14:00

## 2019-07-24 RX ADMIN — MORPHINE SULFATE 30 MILLIGRAM(S): 50 CAPSULE, EXTENDED RELEASE ORAL at 00:21

## 2019-07-24 RX ADMIN — Medication 100 MILLIGRAM(S): at 05:58

## 2019-07-24 NOTE — PROGRESS NOTE ADULT - SUBJECTIVE AND OBJECTIVE BOX
Neurology Follow up note    Name  CARRIE العراقي    HPI:  Taken from Dr. Retana's office note 7/17/2019  "Patient presents to be evaluated for surgical intervention for progressively worsening gait disturbance.  She reports a h/o cervical spine surgery, multiple lumbar spine procedures with the most recent x 3 years ago for scoliosis repair. Since her lower back surgery she has persistent lower back pain and most recent mid upper back pain with onset x 1 year ago.    She has undergone multiple conservative measures ranging from the use of oral pain medications (currently utilizes Percocet and Morphine tabs), NADIR's and as last resort a spinal cord stimulator trial which have all failed at provided lasting symptom relief.    She is off balance and best describes her gait a "walking on a train"" (22 Jul 2019 16:38)      Interval History - back pain and discomfort in the LE - no new focal weakness         REVIEW OF SYSTEMS    Vital Signs Last 24 Hrs  T(C): 37.3 (24 Jul 2019 04:42), Max: 37.9 (23 Jul 2019 20:36)  T(F): 99.1 (24 Jul 2019 04:42), Max: 100.2 (23 Jul 2019 20:36)  HR: 88 (23 Jul 2019 20:36) (80 - 110)  BP: 162/73 (23 Jul 2019 20:36) (140/67 - 162/73)  BP(mean): --  RR: 18 (23 Jul 2019 20:36) (16 - 18)  SpO2: 96% (23 Jul 2019 20:36) (96% - 100%)    Physical Exam-     Mental Status- awake and alert     Cranial Nerves- full EOM    Gait and station-n/a    Motor- moves all 4 extremities    Reflexes- decrease AJ    Sensation- no sensory level    Coordination- no tremors    Vascular - no bruits    Medications  acetaminophen   Tablet .. 650 milliGRAM(s) Oral every 6 hours PRN  artificial  tears Solution 1 Drop(s) Both EYES daily  calcium carbonate 1250 mG  + Vitamin D (OsCal 500 + D) 1 Tablet(s) Oral daily  dexamethasone  Injectable 4 milliGRAM(s) IV Push every 6 hours  dextrose 40% Gel 15 Gram(s) Oral once PRN  dextrose 5%. 1000 milliLiter(s) IV Continuous <Continuous>  dextrose 50% Injectable 12.5 Gram(s) IV Push once  dextrose 50% Injectable 25 Gram(s) IV Push once  dextrose 50% Injectable 25 Gram(s) IV Push once  diazepam    Tablet 5 milliGRAM(s) Oral every 8 hours  docusate sodium 100 milliGRAM(s) Oral three times a day  enoxaparin Injectable 40 milliGRAM(s) SubCutaneous at bedtime  famotidine    Tablet 20 milliGRAM(s) Oral every 12 hours  glucagon  Injectable 1 milliGRAM(s) IntraMuscular once PRN  insulin lispro (HumaLOG) corrective regimen sliding scale   SubCutaneous Before meals and at bedtime  lactated ringers. 1000 milliLiter(s) IV Continuous <Continuous>  magnesium hydroxide Suspension 30 milliLiter(s) Oral every 12 hours PRN  morphine  - Injectable 4 milliGRAM(s) IV Push every 4 hours PRN  morphine  IR 15 milliGRAM(s) Oral every 4 hours PRN  morphine  IR 30 milliGRAM(s) Oral every 4 hours PRN  naloxone Injectable 0.1 milliGRAM(s) IV Push every 3 minutes PRN  ondansetron Injectable 4 milliGRAM(s) IV Push every 6 hours PRN  senna 2 Tablet(s) Oral at bedtime  sertraline 125 milliGRAM(s) Oral daily  traZODone 25 milliGRAM(s) Oral at bedtime      Lab      Radiology    Assessment- Lumbar radiculopathy    Plan as per NS

## 2019-07-24 NOTE — PROGRESS NOTE ADULT - SUBJECTIVE AND OBJECTIVE BOX
HPI:  Taken from Dr. Retana's office note 7/17/2019  "Patient presents to be evaluated for surgical intervention for progressively worsening gait disturbance.  She reports a h/o cervical spine surgery, multiple lumbar spine procedures with the most recent x 3 years ago for scoliosis repair. Since her lower back surgery she has persistent lower back pain and most recent mid upper back pain with onset x 1 year ago.    She has undergone multiple conservative measures ranging from the use of oral pain medications (currently utilizes Percocet and Morphine tabs), NADIR's and as last resort a spinal cord stimulator trial which have all failed at provided lasting symptom relief.    She is off balance and best describes her gait a "walking on a train"" (22 Jul 2019 16:38)    Hospital Course:  7/22: POD#0: s/p T10-11 lami, T9-12 fusion.  No overnight events.  7/23: POD#1: PCA Dilaudid discontinued. Pain well controlled.  7/24: POD#2: No major events overnight. Pain is well controlled.      OVERNIGHT EVENTS: No major events overnight  Vital Signs Last 24 Hrs  T(C): 37.9 (23 Jul 2019 20:36), Max: 37.9 (23 Jul 2019 20:36)  T(F): 100.2 (23 Jul 2019 20:36), Max: 100.2 (23 Jul 2019 20:36)  HR: 88 (23 Jul 2019 20:36) (80 - 110)  BP: 162/73 (23 Jul 2019 20:36) (140/67 - 162/73)  BP(mean): --  RR: 18 (23 Jul 2019 20:36) (16 - 18)  SpO2: 96% (23 Jul 2019 20:36) (96% - 100%)    I&O's Summary    22 Jul 2019 07:01  -  23 Jul 2019 07:00  --------------------------------------------------------  IN: 1110 mL / OUT: 1065 mL / NET: 45 mL    23 Jul 2019 07:01  -  24 Jul 2019 01:11  --------------------------------------------------------  IN: 1950 mL / OUT: 1615 mL / NET: 335 mL        PHYSICAL EXAM:  Neurological: AAOx3, NAD, coherent speech, following commands  CNII-XII grossly intact  MAEx4, strength 4/5 LE b/l, 5/5 UE b/l, no drift  SILT throughout b/l  Incision/wound: thoracic incision clean, dry and intact; aquacell  dressing in place  +KEVIN (superficial)  +HMV (deep)      DIET: Regular    LABS:                        8.4    14.49 )-----------( 185      ( 23 Jul 2019 07:01 )             26.9     07-23    135  |  101  |  13  ----------------------------<  125<H>  4.2   |  25  |  0.58    Ca    8.3<L>      23 Jul 2019 07:00  Phos  2.8     07-23  Mg     1.8     07-23              CAPILLARY BLOOD GLUCOSE      POCT Blood Glucose.: 129 mg/dL (23 Jul 2019 21:25)  POCT Blood Glucose.: 144 mg/dL (23 Jul 2019 17:19)  POCT Blood Glucose.: 125 mg/dL (23 Jul 2019 11:51)  POCT Blood Glucose.: 121 mg/dL (23 Jul 2019 06:31)      Drug Levels: [] N/A    CSF Analysis: [] N/A      Allergies    iodine (Unknown)    Intolerances      MEDICATIONS:  Antibiotics:    Neuro:  diazepam    Tablet 5 milliGRAM(s) Oral every 8 hours  morphine  - Injectable 4 milliGRAM(s) IV Push every 4 hours PRN  morphine  IR 15 milliGRAM(s) Oral every 4 hours PRN  morphine  IR 30 milliGRAM(s) Oral every 4 hours PRN  ondansetron Injectable 4 milliGRAM(s) IV Push every 6 hours PRN  sertraline 125 milliGRAM(s) Oral daily  traZODone 25 milliGRAM(s) Oral at bedtime    Anticoagulation:  enoxaparin Injectable 40 milliGRAM(s) SubCutaneous at bedtime    OTHER:  artificial  tears Solution 1 Drop(s) Both EYES daily  dexamethasone  Injectable 4 milliGRAM(s) IV Push every 6 hours  dextrose 40% Gel 15 Gram(s) Oral once PRN  dextrose 50% Injectable 12.5 Gram(s) IV Push once  dextrose 50% Injectable 25 Gram(s) IV Push once  dextrose 50% Injectable 25 Gram(s) IV Push once  docusate sodium 100 milliGRAM(s) Oral three times a day  famotidine    Tablet 20 milliGRAM(s) Oral every 12 hours  glucagon  Injectable 1 milliGRAM(s) IntraMuscular once PRN  insulin lispro (HumaLOG) corrective regimen sliding scale   SubCutaneous Before meals and at bedtime  magnesium hydroxide Suspension 30 milliLiter(s) Oral every 12 hours PRN  naloxone Injectable 0.1 milliGRAM(s) IV Push every 3 minutes PRN  senna 2 Tablet(s) Oral at bedtime    IVF:  calcium carbonate 1250 mG  + Vitamin D (OsCal 500 + D) 1 Tablet(s) Oral daily  dextrose 5%. 1000 milliLiter(s) IV Continuous <Continuous>  lactated ringers. 1000 milliLiter(s) IV Continuous <Continuous>    CULTURES:    RADIOLOGY & ADDITIONAL TESTS:      ASSESSMENT:  74y Female s/p day 2 T10-11 laminectomy, T9-12 posterior fusion.    M47.14  Handoff  MEWS Score  Anxiety  Depression  Osteoporosis  Suspected deep vein thrombosis (DVT)  Back pain  History of neck surgery  History of back surgery  History of lumbar fusion      PLAN:  PLAN:  -neuro/spinal checks  -Morphine for pain, standing valium   -decadron 4q6 for now, f/u tapering dose   -monitor thoracic wound- plastics closure (Ronit Genao)  -JPx1 (superficial), HMVx1 (deep)- monitor outputs - keep them for output greater than 20ml in 24 hrs.  -SBP normotensive  -room air, satting well  -IVF for now  -advance diet as tolerated  -bowel regimen  -GI ppx  -ISS  -post op abx  -continue home meds  -OOB/PT/OT  -d/w Dr. Retana       DVT PROPHYLAXIS:  [x] Venodynes                                [x] Heparin/Lovenox    DISPOSITION: pending    Assessment:  Present when checked    []  GCS  E   V  M     Heart Failure: []Acute, [] acute on chronic , []chronic  Heart Failure:  [] Diastolic (HFpEF), [] Systolic (HFrEF), []Combined (HFpEF and HFrEF), [] RHF, [] Pulm HTN, [] Other    [] LUIZA, [] ATN, [] AIN, [] other  [] CKD1, [] CKD2, [] CKD 3, [] CKD 4, [] CKD 5, []ESRD    Encephalopathy: [] Metabolic, [] Hepatic, [] toxic, [] Neurological, [] Other    Abnormal Nurtitional Status: [] malnurtition (see nutrition note), [ ]underweight: BMI < 19, [] morbid obesity: BMI >40, [] Cachexia    [] Sepsis  [] hypovolemic shock,[] cardiogenic shock, [] hemorrhagic shock, [] neuogenic shock  [] Acute Respiratory Failure  []Cerebral edema, [] Brain compression/ herniation,   [] Functional quadriplegia  [] Acute blood loss anemia

## 2019-07-25 LAB
ANION GAP SERPL CALC-SCNC: 9 MMOL/L — SIGNIFICANT CHANGE UP (ref 5–17)
BUN SERPL-MCNC: 10 MG/DL — SIGNIFICANT CHANGE UP (ref 7–23)
CALCIUM SERPL-MCNC: 8.3 MG/DL — LOW (ref 8.4–10.5)
CHLORIDE SERPL-SCNC: 100 MMOL/L — SIGNIFICANT CHANGE UP (ref 96–108)
CO2 SERPL-SCNC: 27 MMOL/L — SIGNIFICANT CHANGE UP (ref 22–31)
CREAT SERPL-MCNC: 0.55 MG/DL — SIGNIFICANT CHANGE UP (ref 0.5–1.3)
GLUCOSE BLDC GLUCOMTR-MCNC: 101 MG/DL — HIGH (ref 70–99)
GLUCOSE BLDC GLUCOMTR-MCNC: 110 MG/DL — HIGH (ref 70–99)
GLUCOSE BLDC GLUCOMTR-MCNC: 160 MG/DL — HIGH (ref 70–99)
GLUCOSE BLDC GLUCOMTR-MCNC: 93 MG/DL — SIGNIFICANT CHANGE UP (ref 70–99)
GLUCOSE SERPL-MCNC: 107 MG/DL — HIGH (ref 70–99)
HCT VFR BLD CALC: 27.5 % — LOW (ref 34.5–45)
HGB BLD-MCNC: 8.6 G/DL — LOW (ref 11.5–15.5)
MAGNESIUM SERPL-MCNC: 2.1 MG/DL — SIGNIFICANT CHANGE UP (ref 1.6–2.6)
MCHC RBC-ENTMCNC: 28.5 PG — SIGNIFICANT CHANGE UP (ref 27–34)
MCHC RBC-ENTMCNC: 31.3 GM/DL — LOW (ref 32–36)
MCV RBC AUTO: 91.1 FL — SIGNIFICANT CHANGE UP (ref 80–100)
NRBC # BLD: 0 /100 WBCS — SIGNIFICANT CHANGE UP (ref 0–0)
PHOSPHATE SERPL-MCNC: 2.2 MG/DL — LOW (ref 2.5–4.5)
PLATELET # BLD AUTO: 207 K/UL — SIGNIFICANT CHANGE UP (ref 150–400)
POTASSIUM SERPL-MCNC: 3.7 MMOL/L — SIGNIFICANT CHANGE UP (ref 3.5–5.3)
POTASSIUM SERPL-SCNC: 3.7 MMOL/L — SIGNIFICANT CHANGE UP (ref 3.5–5.3)
RBC # BLD: 3.02 M/UL — LOW (ref 3.8–5.2)
RBC # FLD: 14.6 % — HIGH (ref 10.3–14.5)
SODIUM SERPL-SCNC: 136 MMOL/L — SIGNIFICANT CHANGE UP (ref 135–145)
WBC # BLD: 13.54 K/UL — HIGH (ref 3.8–10.5)
WBC # FLD AUTO: 13.54 K/UL — HIGH (ref 3.8–10.5)

## 2019-07-25 RX ORDER — DEXAMETHASONE 0.5 MG/5ML
ELIXIR ORAL
Refills: 0 | Status: COMPLETED | OUTPATIENT
Start: 2019-07-25 | End: 2019-07-28

## 2019-07-25 RX ORDER — TRAZODONE HCL 50 MG
50 TABLET ORAL AT BEDTIME
Refills: 0 | Status: DISCONTINUED | OUTPATIENT
Start: 2019-07-25 | End: 2019-08-02

## 2019-07-25 RX ORDER — TRAZODONE HCL 50 MG
0.5 TABLET ORAL
Qty: 0 | Refills: 0 | DISCHARGE

## 2019-07-25 RX ORDER — DEXAMETHASONE 0.5 MG/5ML
2 ELIXIR ORAL EVERY 6 HOURS
Refills: 0 | Status: COMPLETED | OUTPATIENT
Start: 2019-07-26 | End: 2019-07-27

## 2019-07-25 RX ORDER — CLONAZEPAM 1 MG
1 TABLET ORAL
Qty: 0 | Refills: 0 | DISCHARGE

## 2019-07-25 RX ORDER — DEXAMETHASONE 0.5 MG/5ML
1 ELIXIR ORAL EVERY 6 HOURS
Refills: 0 | Status: COMPLETED | OUTPATIENT
Start: 2019-07-27 | End: 2019-07-28

## 2019-07-25 RX ORDER — TRAZODONE HCL 50 MG
25 TABLET ORAL ONCE
Refills: 0 | Status: COMPLETED | OUTPATIENT
Start: 2019-07-25 | End: 2019-07-25

## 2019-07-25 RX ORDER — DEXAMETHASONE 0.5 MG/5ML
4 ELIXIR ORAL EVERY 6 HOURS
Refills: 0 | Status: COMPLETED | OUTPATIENT
Start: 2019-07-25 | End: 2019-07-26

## 2019-07-25 RX ORDER — SERTRALINE 25 MG/1
0 TABLET, FILM COATED ORAL
Qty: 0 | Refills: 0 | DISCHARGE

## 2019-07-25 RX ORDER — SODIUM,POTASSIUM PHOSPHATES 278-250MG
1 POWDER IN PACKET (EA) ORAL ONCE
Refills: 0 | Status: COMPLETED | OUTPATIENT
Start: 2019-07-25 | End: 2019-07-25

## 2019-07-25 RX ORDER — CLONAZEPAM 1 MG
1 TABLET ORAL EVERY 8 HOURS
Refills: 0 | Status: DISCONTINUED | OUTPATIENT
Start: 2019-07-25 | End: 2019-08-01

## 2019-07-25 RX ADMIN — FAMOTIDINE 20 MILLIGRAM(S): 10 INJECTION INTRAVENOUS at 17:40

## 2019-07-25 RX ADMIN — ENOXAPARIN SODIUM 40 MILLIGRAM(S): 100 INJECTION SUBCUTANEOUS at 22:22

## 2019-07-25 RX ADMIN — Medication 100 MILLIGRAM(S): at 05:22

## 2019-07-25 RX ADMIN — SENNA PLUS 2 TABLET(S): 8.6 TABLET ORAL at 22:22

## 2019-07-25 RX ADMIN — MORPHINE SULFATE 30 MILLIGRAM(S): 50 CAPSULE, EXTENDED RELEASE ORAL at 15:30

## 2019-07-25 RX ADMIN — MORPHINE SULFATE 30 MILLIGRAM(S): 50 CAPSULE, EXTENDED RELEASE ORAL at 05:10

## 2019-07-25 RX ADMIN — Medication 1 TABLET(S): at 09:43

## 2019-07-25 RX ADMIN — MORPHINE SULFATE 30 MILLIGRAM(S): 50 CAPSULE, EXTENDED RELEASE ORAL at 16:30

## 2019-07-25 RX ADMIN — Medication 4 MILLIGRAM(S): at 13:00

## 2019-07-25 RX ADMIN — Medication 2: at 22:22

## 2019-07-25 RX ADMIN — Medication 1 DROP(S): at 09:42

## 2019-07-25 RX ADMIN — Medication 100 MILLIGRAM(S): at 13:01

## 2019-07-25 RX ADMIN — MORPHINE SULFATE 30 MILLIGRAM(S): 50 CAPSULE, EXTENDED RELEASE ORAL at 09:57

## 2019-07-25 RX ADMIN — Medication 1 PACKET(S): at 10:24

## 2019-07-25 RX ADMIN — Medication 50 MILLIGRAM(S): at 22:45

## 2019-07-25 RX ADMIN — MORPHINE SULFATE 30 MILLIGRAM(S): 50 CAPSULE, EXTENDED RELEASE ORAL at 23:00

## 2019-07-25 RX ADMIN — MORPHINE SULFATE 30 MILLIGRAM(S): 50 CAPSULE, EXTENDED RELEASE ORAL at 10:51

## 2019-07-25 RX ADMIN — Medication 4 MILLIGRAM(S): at 17:39

## 2019-07-25 RX ADMIN — FAMOTIDINE 20 MILLIGRAM(S): 10 INJECTION INTRAVENOUS at 05:22

## 2019-07-25 RX ADMIN — Medication 4 MILLIGRAM(S): at 05:22

## 2019-07-25 RX ADMIN — Medication 1 MILLIGRAM(S): at 13:04

## 2019-07-25 RX ADMIN — MORPHINE SULFATE 30 MILLIGRAM(S): 50 CAPSULE, EXTENDED RELEASE ORAL at 22:23

## 2019-07-25 RX ADMIN — Medication 1 MILLIGRAM(S): at 22:22

## 2019-07-25 RX ADMIN — Medication 5 MILLIGRAM(S): at 05:22

## 2019-07-25 RX ADMIN — Medication 5 MILLIGRAM(S): at 22:22

## 2019-07-25 RX ADMIN — Medication 25 MILLIGRAM(S): at 02:38

## 2019-07-25 RX ADMIN — Medication 100 MILLIGRAM(S): at 22:22

## 2019-07-25 RX ADMIN — MORPHINE SULFATE 30 MILLIGRAM(S): 50 CAPSULE, EXTENDED RELEASE ORAL at 04:41

## 2019-07-25 RX ADMIN — SERTRALINE 125 MILLIGRAM(S): 25 TABLET, FILM COATED ORAL at 09:43

## 2019-07-25 NOTE — PROGRESS NOTE ADULT - SUBJECTIVE AND OBJECTIVE BOX
HPI:  Taken from Dr. Retana's office note 7/17/2019  "Patient presents to be evaluated for surgical intervention for progressively worsening gait disturbance.  She reports a h/o cervical spine surgery, multiple lumbar spine procedures with the most recent x 3 years ago for scoliosis repair. Since her lower back surgery she has persistent lower back pain and most recent mid upper back pain with onset x 1 year ago.    She has undergone multiple conservative measures ranging from the use of oral pain medications (currently utilizes Percocet and Morphine tabs), NADIR's and as last resort a spinal cord stimulator trial which have all failed at provided lasting symptom relief.    She is off balance and best describes her gait a "walking on a train"" (22 Jul 2019 16:38)    OVERNIGHT EVENTS: pal overnight.     Hospital Course:  7/22: POD#0: s/p T10-11 lami, T9-12 fusion.  No overnight events.  7/23: POD#1: PCA Dilaudid discontinued. Pain well controlled.  7/24: POD#2: No major events overnight. Pain is well controlled.  7/25: POD #3: PAL overnight.     Vital Signs Last 24 Hrs  T(C): 36.6 (25 Jul 2019 04:36), Max: 36.8 (24 Jul 2019 20:47)  T(F): 97.8 (25 Jul 2019 04:36), Max: 98.3 (24 Jul 2019 20:47)  HR: 69 (25 Jul 2019 04:36) (60 - 110)  BP: 158/84 (25 Jul 2019 04:36) (115/70 - 158/84)  BP(mean): --  RR: 18 (25 Jul 2019 04:36) (16 - 18)  SpO2: 99% (25 Jul 2019 04:36) (96% - 100%)    I&O's Summary    23 Jul 2019 07:01  -  24 Jul 2019 07:00  --------------------------------------------------------  IN: 1950 mL / OUT: 3208 mL / NET: -1258 mL    24 Jul 2019 07:01  -  25 Jul 2019 05:39  --------------------------------------------------------  IN: 1315 mL / OUT: 1665 mL / NET: -350 mL        PHYSICAL EXAM:  Neurological: AAOx3, NAD, coherent speech, following commands  CNII-XII grossly intact  MAEx4, strength 4/5 LE b/l, 5/5 UE b/l, no drift  SILT throughout b/l  Incision/wound: thoracic incision clean, dry and intact; aquacell  dressing in place  +KEVIN (superficial)  +HMV (deep)      LABS:                        8.9    14.93 )-----------( 219      ( 24 Jul 2019 07:15 )             28.2     07-24    140  |  102  |  10  ----------------------------<  121<H>  4.0   |  28  |  0.56    Ca    8.8      24 Jul 2019 07:15  Phos  2.6     07-24  Mg     2.1     07-24              CAPILLARY BLOOD GLUCOSE      POCT Blood Glucose.: 121 mg/dL (24 Jul 2019 21:26)  POCT Blood Glucose.: 153 mg/dL (24 Jul 2019 17:24)  POCT Blood Glucose.: 95 mg/dL (24 Jul 2019 11:31)  POCT Blood Glucose.: 122 mg/dL (24 Jul 2019 06:48)      Drug Levels: [] N/A    CSF Analysis: [] N/A      Allergies    iodine (Unknown)    Intolerances      MEDICATIONS:  Antibiotics:    Neuro:  acetaminophen   Tablet .. 650 milliGRAM(s) Oral every 6 hours PRN  diazepam    Tablet 5 milliGRAM(s) Oral every 8 hours  morphine  - Injectable 4 milliGRAM(s) IV Push every 4 hours PRN  morphine  IR 15 milliGRAM(s) Oral every 4 hours PRN  morphine  IR 30 milliGRAM(s) Oral every 4 hours PRN  ondansetron Injectable 4 milliGRAM(s) IV Push every 6 hours PRN  sertraline 125 milliGRAM(s) Oral daily  traZODone 25 milliGRAM(s) Oral at bedtime    Anticoagulation:  enoxaparin Injectable 40 milliGRAM(s) SubCutaneous at bedtime    OTHER:  artificial  tears Solution 1 Drop(s) Both EYES daily  dexamethasone  Injectable 4 milliGRAM(s) IV Push every 6 hours  dextrose 40% Gel 15 Gram(s) Oral once PRN  dextrose 50% Injectable 12.5 Gram(s) IV Push once  dextrose 50% Injectable 25 Gram(s) IV Push once  dextrose 50% Injectable 25 Gram(s) IV Push once  docusate sodium 100 milliGRAM(s) Oral three times a day  famotidine    Tablet 20 milliGRAM(s) Oral every 12 hours  glucagon  Injectable 1 milliGRAM(s) IntraMuscular once PRN  insulin lispro (HumaLOG) corrective regimen sliding scale   SubCutaneous Before meals and at bedtime  magnesium hydroxide Suspension 30 milliLiter(s) Oral every 12 hours PRN  naloxone Injectable 0.1 milliGRAM(s) IV Push every 3 minutes PRN  senna 2 Tablet(s) Oral at bedtime    IVF:  calcium carbonate 1250 mG  + Vitamin D (OsCal 500 + D) 1 Tablet(s) Oral daily  dextrose 5%. 1000 milliLiter(s) IV Continuous <Continuous>    CULTURES:    RADIOLOGY & ADDITIONAL TESTS:      ASSESSMENT:  74y Female s/p day 3 T10-11 laminectomy, T9-12 posterior fusion.    M47.14  Handoff  MEWS Score  Anxiety  Depression  Osteoporosis  Suspected deep vein thrombosis (DVT)  Back pain  History of neck surgery  History of back surgery  History of lumbar fusion      PLAN:  -neuro/spinal checks  -Morphine for pain, standing valium   -dex taper   -monitor thoracic wound- plastics closure (Ronit Genao)  -JPx1 (superficial), HMVx1 (deep)- monitor outputs - keep them for output greater than 20ml in 24 hrs.  -SBP normotensive  -room air, satting well  -IVL   -advance diet as tolerated  -bowel regimen  -GI ppx  -ISS  -continue home meds  -OOB/PT/OT  -d/w Dr. Retana       DVT PROPHYLAXIS:  [x] Venodynes                                [x] Heparin/Lovenox    DISPOSITION: pending    Assessment:  Present when checked    []  GCS  E   V  M     Heart Failure: []Acute, [] acute on chronic , []chronic  Heart Failure:  [] Diastolic (HFpEF), [] Systolic (HFrEF), []Combined (HFpEF and HFrEF), [] RHF, [] Pulm HTN, [] Other    [] LUIZA, [] ATN, [] AIN, [] other  [] CKD1, [] CKD2, [] CKD 3, [] CKD 4, [] CKD 5, []ESRD    Encephalopathy: [] Metabolic, [] Hepatic, [] toxic, [] Neurological, [] Other    Abnormal Nurtitional Status: [] malnurtition (see nutrition note), [ ]underweight: BMI < 19, [] morbid obesity: BMI >40, [] Cachexia    [] Sepsis  [] hypovolemic shock,[] cardiogenic shock, [] hemorrhagic shock, [] neuogenic shock  [] Acute Respiratory Failure  []Cerebral edema, [] Brain compression/ herniation,   [] Functional quadriplegia  [] Acute blood loss anemia

## 2019-07-25 NOTE — PROGRESS NOTE ADULT - ASSESSMENT
A/P: Pt doing well POD#3 s/p plastic surgery closure of back.   1. f/u drain output   2. Change Aquacel today  3. HV drain per Spine surgery; KEVIN drain to remain in until less than 20mL/24 hours.   4. Sutures to remain in for 4 weeks

## 2019-07-25 NOTE — PROGRESS NOTE ADULT - SUBJECTIVE AND OBJECTIVE BOX
Pt seen and examined.     Exam:  Surgical site c/d/i. No fluctuance or drainage.   Drains 300 & 58mL/24 hours

## 2019-07-25 NOTE — PROGRESS NOTE ADULT - SUBJECTIVE AND OBJECTIVE BOX
Patient is a 74y old  Female who presents with a chief complaint of Back Surgery (25 Jul 2019 08:03)        HPI:  Taken from Dr. Retana's office note 7/17/2019  "Patient presents to be evaluated for surgical intervention for progressively worsening gait disturbance.  She reports a h/o cervical spine surgery, multiple lumbar spine procedures with the most recent x 3 years ago for scoliosis repair. Since her lower back surgery she has persistent lower back pain and most recent mid upper back pain with onset x 1 year ago.    She has undergone multiple conservative measures ranging from the use of oral pain medications (currently utilizes Percocet and Morphine tabs), NADIR's and as last resort a spinal cord stimulator trial which have all failed at provided lasting symptom relief.    She is off balance and best describes her gait a "walking on a train"" (22 Jul 2019 16:38)     Back pain and radicular symptoms improved  Allergies  iodine (Unknown)      Health Issues  M47.14  Handoff  MEWS Score  Anxiety  Depression  Osteoporosis  Suspected deep vein thrombosis (DVT)  Back pain  History of neck surgery  History of back surgery  History of lumbar fusion        FAMILY HISTORY:      MEDICATIONS  (STANDING):  artificial  tears Solution 1 Drop(s) Both EYES daily  calcium carbonate 1250 mG  + Vitamin D (OsCal 500 + D) 1 Tablet(s) Oral daily  dexamethasone  Injectable 4 milliGRAM(s) IV Push every 6 hours  dextrose 5%. 1000 milliLiter(s) (50 mL/Hr) IV Continuous <Continuous>  dextrose 50% Injectable 12.5 Gram(s) IV Push once  dextrose 50% Injectable 25 Gram(s) IV Push once  dextrose 50% Injectable 25 Gram(s) IV Push once  diazepam    Tablet 5 milliGRAM(s) Oral every 8 hours  docusate sodium 100 milliGRAM(s) Oral three times a day  enoxaparin Injectable 40 milliGRAM(s) SubCutaneous at bedtime  famotidine    Tablet 20 milliGRAM(s) Oral every 12 hours  insulin lispro (HumaLOG) corrective regimen sliding scale   SubCutaneous Before meals and at bedtime  senna 2 Tablet(s) Oral at bedtime  sertraline 125 milliGRAM(s) Oral daily  traZODone 25 milliGRAM(s) Oral at bedtime    MEDICATIONS  (PRN):  acetaminophen   Tablet .. 650 milliGRAM(s) Oral every 6 hours PRN Temp greater or equal to 38C (100.4F), Moderate Pain (4 - 6)  clonazePAM  Tablet 1 milliGRAM(s) Oral every 8 hours PRN anxiety  dextrose 40% Gel 15 Gram(s) Oral once PRN Blood Glucose LESS THAN 70 milliGRAM(s)/deciliter  glucagon  Injectable 1 milliGRAM(s) IntraMuscular once PRN Glucose LESS THAN 70 milligrams/deciliter  magnesium hydroxide Suspension 30 milliLiter(s) Oral every 12 hours PRN Constipation  morphine  - Injectable 4 milliGRAM(s) IV Push every 4 hours PRN breakthrough pain  morphine  IR 15 milliGRAM(s) Oral every 4 hours PRN Moderate Pain (4 - 6)  morphine  IR 30 milliGRAM(s) Oral every 4 hours PRN Severe Pain (7 - 10)  naloxone Injectable 0.1 milliGRAM(s) IV Push every 3 minutes PRN For ANY of the following changes in patient status:  A. RR LESS THAN 10 breaths per minute, B. Oxygen saturation LESS THAN 90%, C. Sedation score of 6  ondansetron Injectable 4 milliGRAM(s) IV Push every 6 hours PRN Nausea      PAST MEDICAL & SURGICAL HISTORY:  Anxiety  Depression  Osteoporosis  History of neck surgery  History of back surgery: x4  History of lumbar fusion      Labs                          8.6    13.54 )-----------( 207      ( 25 Jul 2019 07:29 )             27.5     07-25    136  |  100  |  10  ----------------------------<  107<H>  3.7   |  27  |  0.55    Ca    8.3<L>      25 Jul 2019 07:29  Phos  2.2     07-25  Mg     2.1     07-25        Radiology:    Physical Exam    MENTAL STATUS  -Level of Consciousness- awake    Orientation- person, place time  Language- aphasia/ dysarthria- nl  Memory- recent and remote- nl      Cranial Nerve 1- 12  Pupils- equal and reactive  Eye movements-  full  Facial - no asymmetry   Lower CN-nl    Gait and Station- no foot drop    MOTOR  Upper-nl  Lower- no focal weakness     Reflexes- decreased     Sensation- no sensory level    Cerebellar- no tremors     vascular - no bruits    Assessment- Lumbar radiculopathy    Plan as per ortho / NS

## 2019-07-26 LAB
ANION GAP SERPL CALC-SCNC: 9 MMOL/L — SIGNIFICANT CHANGE UP (ref 5–17)
BUN SERPL-MCNC: 15 MG/DL — SIGNIFICANT CHANGE UP (ref 7–23)
CALCIUM SERPL-MCNC: 8.6 MG/DL — SIGNIFICANT CHANGE UP (ref 8.4–10.5)
CHLORIDE SERPL-SCNC: 99 MMOL/L — SIGNIFICANT CHANGE UP (ref 96–108)
CO2 SERPL-SCNC: 25 MMOL/L — SIGNIFICANT CHANGE UP (ref 22–31)
CREAT SERPL-MCNC: 0.62 MG/DL — SIGNIFICANT CHANGE UP (ref 0.5–1.3)
GLUCOSE BLDC GLUCOMTR-MCNC: 77 MG/DL — SIGNIFICANT CHANGE UP (ref 70–99)
GLUCOSE BLDC GLUCOMTR-MCNC: 85 MG/DL — SIGNIFICANT CHANGE UP (ref 70–99)
GLUCOSE BLDC GLUCOMTR-MCNC: 86 MG/DL — SIGNIFICANT CHANGE UP (ref 70–99)
GLUCOSE BLDC GLUCOMTR-MCNC: 91 MG/DL — SIGNIFICANT CHANGE UP (ref 70–99)
GLUCOSE SERPL-MCNC: 130 MG/DL — HIGH (ref 70–99)
HCT VFR BLD CALC: 28.9 % — LOW (ref 34.5–45)
HGB BLD-MCNC: 9 G/DL — LOW (ref 11.5–15.5)
MAGNESIUM SERPL-MCNC: 2 MG/DL — SIGNIFICANT CHANGE UP (ref 1.6–2.6)
MCHC RBC-ENTMCNC: 28.5 PG — SIGNIFICANT CHANGE UP (ref 27–34)
MCHC RBC-ENTMCNC: 31.1 GM/DL — LOW (ref 32–36)
MCV RBC AUTO: 91.5 FL — SIGNIFICANT CHANGE UP (ref 80–100)
NRBC # BLD: 0 /100 WBCS — SIGNIFICANT CHANGE UP (ref 0–0)
PHOSPHATE SERPL-MCNC: 2.1 MG/DL — LOW (ref 2.5–4.5)
PLATELET # BLD AUTO: 283 K/UL — SIGNIFICANT CHANGE UP (ref 150–400)
POTASSIUM SERPL-MCNC: 3.6 MMOL/L — SIGNIFICANT CHANGE UP (ref 3.5–5.3)
POTASSIUM SERPL-SCNC: 3.6 MMOL/L — SIGNIFICANT CHANGE UP (ref 3.5–5.3)
RBC # BLD: 3.16 M/UL — LOW (ref 3.8–5.2)
RBC # FLD: 14.4 % — SIGNIFICANT CHANGE UP (ref 10.3–14.5)
SODIUM SERPL-SCNC: 133 MMOL/L — LOW (ref 135–145)
WBC # BLD: 15.98 K/UL — HIGH (ref 3.8–10.5)
WBC # FLD AUTO: 15.98 K/UL — HIGH (ref 3.8–10.5)

## 2019-07-26 RX ORDER — FLUTICASONE PROPIONATE 50 MCG
1 SPRAY, SUSPENSION NASAL
Refills: 0 | Status: DISCONTINUED | OUTPATIENT
Start: 2019-07-26 | End: 2019-08-02

## 2019-07-26 RX ORDER — POLYETHYLENE GLYCOL 3350 17 G/17G
17 POWDER, FOR SOLUTION ORAL DAILY
Refills: 0 | Status: DISCONTINUED | OUTPATIENT
Start: 2019-07-26 | End: 2019-07-30

## 2019-07-26 RX ADMIN — FAMOTIDINE 20 MILLIGRAM(S): 10 INJECTION INTRAVENOUS at 17:16

## 2019-07-26 RX ADMIN — Medication 1 MILLIGRAM(S): at 11:36

## 2019-07-26 RX ADMIN — Medication 1 MILLIGRAM(S): at 21:36

## 2019-07-26 RX ADMIN — Medication 2 MILLIGRAM(S): at 17:16

## 2019-07-26 RX ADMIN — Medication 5 MILLIGRAM(S): at 14:38

## 2019-07-26 RX ADMIN — MORPHINE SULFATE 30 MILLIGRAM(S): 50 CAPSULE, EXTENDED RELEASE ORAL at 05:48

## 2019-07-26 RX ADMIN — Medication 4 MILLIGRAM(S): at 11:36

## 2019-07-26 RX ADMIN — Medication 1 TABLET(S): at 11:34

## 2019-07-26 RX ADMIN — Medication 1 DROP(S): at 11:54

## 2019-07-26 RX ADMIN — MORPHINE SULFATE 30 MILLIGRAM(S): 50 CAPSULE, EXTENDED RELEASE ORAL at 22:00

## 2019-07-26 RX ADMIN — MORPHINE SULFATE 30 MILLIGRAM(S): 50 CAPSULE, EXTENDED RELEASE ORAL at 17:17

## 2019-07-26 RX ADMIN — Medication 100 MILLIGRAM(S): at 05:48

## 2019-07-26 RX ADMIN — ENOXAPARIN SODIUM 40 MILLIGRAM(S): 100 INJECTION SUBCUTANEOUS at 21:36

## 2019-07-26 RX ADMIN — MORPHINE SULFATE 30 MILLIGRAM(S): 50 CAPSULE, EXTENDED RELEASE ORAL at 21:36

## 2019-07-26 RX ADMIN — Medication 100 MILLIGRAM(S): at 14:38

## 2019-07-26 RX ADMIN — MAGNESIUM HYDROXIDE 30 MILLILITER(S): 400 TABLET, CHEWABLE ORAL at 21:37

## 2019-07-26 RX ADMIN — Medication 100 MILLIGRAM(S): at 21:36

## 2019-07-26 RX ADMIN — Medication 650 MILLIGRAM(S): at 21:36

## 2019-07-26 RX ADMIN — MORPHINE SULFATE 30 MILLIGRAM(S): 50 CAPSULE, EXTENDED RELEASE ORAL at 06:15

## 2019-07-26 RX ADMIN — SENNA PLUS 2 TABLET(S): 8.6 TABLET ORAL at 21:36

## 2019-07-26 RX ADMIN — SERTRALINE 125 MILLIGRAM(S): 25 TABLET, FILM COATED ORAL at 11:35

## 2019-07-26 RX ADMIN — Medication 5 MILLIGRAM(S): at 05:48

## 2019-07-26 RX ADMIN — MORPHINE SULFATE 30 MILLIGRAM(S): 50 CAPSULE, EXTENDED RELEASE ORAL at 10:57

## 2019-07-26 RX ADMIN — Medication 5 MILLIGRAM(S): at 21:36

## 2019-07-26 RX ADMIN — Medication 50 MILLIGRAM(S): at 21:36

## 2019-07-26 RX ADMIN — Medication 4 MILLIGRAM(S): at 05:49

## 2019-07-26 RX ADMIN — Medication 650 MILLIGRAM(S): at 22:00

## 2019-07-26 RX ADMIN — FAMOTIDINE 20 MILLIGRAM(S): 10 INJECTION INTRAVENOUS at 05:48

## 2019-07-26 RX ADMIN — MORPHINE SULFATE 30 MILLIGRAM(S): 50 CAPSULE, EXTENDED RELEASE ORAL at 09:49

## 2019-07-26 NOTE — DIETITIAN INITIAL EVALUATION ADULT. - ENERGY NEEDS
Ht (7/22): 165.1cm, Wt (7/22): 53.9kg, IBW: 56.8kg +/-10%, %IBW: 94%, BMI: 19.8   ABW used to calculate energy needs due to pt's current body weight within % IBW. Needs adjusted for age, post-op. Aim for higher end of kcal range. Fluid needs per team 2/2 hyponatremia.

## 2019-07-26 NOTE — PROGRESS NOTE ADULT - SUBJECTIVE AND OBJECTIVE BOX
Neurology Follow up note    Name  CARRIE العراقي    HPI:  Taken from Dr. Retana's office note 7/17/2019  "Patient presents to be evaluated for surgical intervention for progressively worsening gait disturbance.  She reports a h/o cervical spine surgery, multiple lumbar spine procedures with the most recent x 3 years ago for scoliosis repair. Since her lower back surgery she has persistent lower back pain and most recent mid upper back pain with onset x 1 year ago.    She has undergone multiple conservative measures ranging from the use of oral pain medications (currently utilizes Percocet and Morphine tabs), NADIR's and as last resort a spinal cord stimulator trial which have all failed at provided lasting symptom relief.    She is off balance and best describes her gait a "walking on a train"" (22 Jul 2019 16:38)      Interval History - back pain and radicular symptoms        REVIEW OF SYSTEMS    Vital Signs Last 24 Hrs  T(C): 35.6 (26 Jul 2019 05:40), Max: 37 (25 Jul 2019 16:35)  T(F): 96 (26 Jul 2019 05:40), Max: 98.6 (25 Jul 2019 16:35)  HR: 68 (26 Jul 2019 05:40) (68 - 122)  BP: 110/58 (26 Jul 2019 05:40) (98/55 - 152/70)  BP(mean): --  RR: 18 (26 Jul 2019 05:40) (16 - 18)  SpO2: 100% (26 Jul 2019 05:40) (96% - 100%)    Physical Exam-     Mental Status- awake and alert    Cranial Nerves- full EOM    Gait and station- n/a    Motor- moves all 4 extremities    Reflexes- decreased    Sensation- no sensory level    Coordination- no tremors    Vascular - no bruits    Medications  acetaminophen   Tablet .. 650 milliGRAM(s) Oral every 6 hours PRN  artificial  tears Solution 1 Drop(s) Both EYES daily  calcium carbonate 1250 mG  + Vitamin D (OsCal 500 + D) 1 Tablet(s) Oral daily  clonazePAM  Tablet 1 milliGRAM(s) Oral every 8 hours PRN  dexamethasone     Tablet   Oral   dexamethasone     Tablet 4 milliGRAM(s) Oral every 6 hours  dexamethasone     Tablet 2 milliGRAM(s) Oral every 6 hours  dextrose 40% Gel 15 Gram(s) Oral once PRN  dextrose 5%. 1000 milliLiter(s) IV Continuous <Continuous>  dextrose 50% Injectable 12.5 Gram(s) IV Push once  dextrose 50% Injectable 25 Gram(s) IV Push once  dextrose 50% Injectable 25 Gram(s) IV Push once  diazepam    Tablet 5 milliGRAM(s) Oral every 8 hours  docusate sodium 100 milliGRAM(s) Oral three times a day  enoxaparin Injectable 40 milliGRAM(s) SubCutaneous at bedtime  famotidine    Tablet 20 milliGRAM(s) Oral every 12 hours  glucagon  Injectable 1 milliGRAM(s) IntraMuscular once PRN  insulin lispro (HumaLOG) corrective regimen sliding scale   SubCutaneous Before meals and at bedtime  magnesium hydroxide Suspension 30 milliLiter(s) Oral every 12 hours PRN  morphine  - Injectable 4 milliGRAM(s) IV Push every 4 hours PRN  morphine  IR 15 milliGRAM(s) Oral every 4 hours PRN  morphine  IR 30 milliGRAM(s) Oral every 4 hours PRN  naloxone Injectable 0.1 milliGRAM(s) IV Push every 3 minutes PRN  ondansetron Injectable 4 milliGRAM(s) IV Push every 6 hours PRN  senna 2 Tablet(s) Oral at bedtime  sertraline 125 milliGRAM(s) Oral daily  traZODone 50 milliGRAM(s) Oral at bedtime      Lab      Radiology    Assessment- Lumbar radiculopathy    Plan as per ortho

## 2019-07-26 NOTE — PROGRESS NOTE ADULT - SUBJECTIVE AND OBJECTIVE BOX
HPI:  Taken from Dr. Retana's office note 7/17/2019  "Patient presents to be evaluated for surgical intervention for progressively worsening gait disturbance.  She reports a h/o cervical spine surgery, multiple lumbar spine procedures with the most recent x 3 years ago for scoliosis repair. Since her lower back surgery she has persistent lower back pain and most recent mid upper back pain with onset x 1 year ago.    She has undergone multiple conservative measures ranging from the use of oral pain medications (currently utilizes Percocet and Morphine tabs), NADIR's and as last resort a spinal cord stimulator trial which have all failed at provided lasting symptom relief.    She is off balance and best describes her gait a "walking on a train"" (22 Jul 2019 16:38)      Hospital course:   7/22: POD#0: s/p T10-11 lami, T9-12 fusion.  No overnight events.  7/23: POD#1: PCA Dilaudid discontinued. Pain well controlled.  7/24: POD#2: No major events overnight. Pain is well controlled.  7/25: POD #3: PAL overnight. Aquacel dressing changed today. Lightheaded with PT, will reattempt tomorrow. Pt recommending home w/ home PT   7/26: POD #4: No issues overnight, pain well controlled. HMV to be removed today.     Vital Signs Last 24 Hrs  T(C): 36.7 (25 Jul 2019 23:56), Max: 37 (25 Jul 2019 16:35)  T(F): 98 (25 Jul 2019 23:56), Max: 98.6 (25 Jul 2019 16:35)  HR: 86 (25 Jul 2019 23:56) (69 - 122)  BP: 98/55 (25 Jul 2019 23:56) (98/55 - 158/84)  BP(mean): --  RR: 18 (25 Jul 2019 23:56) (16 - 18)  SpO2: 100% (25 Jul 2019 23:56) (96% - 100%)    I&O's Summary    24 Jul 2019 07:01  -  25 Jul 2019 07:00  --------------------------------------------------------  IN: 1315 mL / OUT: 2065 mL / NET: -750 mL    25 Jul 2019 07:01  -  26 Jul 2019 02:57  --------------------------------------------------------  IN: 800 mL / OUT: 475 mL / NET: 325 mL        PHYSICAL EXAM:  Neurological: AAOx3, NAD, coherent speech, following commands  CNII-XII grossly intact  MAEx4, strength 4/5 LE b/l, 5/5 UE b/l, no drift  SILT throughout b/l  Incision/wound: thoracic incision clean, dry and intact; aquacell  dressing in place  +KEVIN (superficial)  +HMV (deep)    TUBES/LINES:  [] Ybarra  [] A-line  [] Lumbar Drain  [x] Wound Drains x2  [] NGT   [] EVD   [] CVC  [] Other      DIET:  [] NPO  [x] Mechanical  [] Tube feeds    LABS:                        8.6    13.54 )-----------( 207      ( 25 Jul 2019 07:29 )             27.5     07-25    136  |  100  |  10  ----------------------------<  107<H>  3.7   |  27  |  0.55    Ca    8.3<L>      25 Jul 2019 07:29  Phos  2.2     07-25  Mg     2.1     07-25              CAPILLARY BLOOD GLUCOSE      POCT Blood Glucose.: 160 mg/dL (25 Jul 2019 22:16)  POCT Blood Glucose.: 93 mg/dL (25 Jul 2019 17:30)  POCT Blood Glucose.: 110 mg/dL (25 Jul 2019 11:47)  POCT Blood Glucose.: 101 mg/dL (25 Jul 2019 06:57)      Drug Levels: [] N/A    CSF Analysis: [] N/A      Allergies    iodine (Unknown)    Intolerances        Home Medications:  Calcium 600+D oral tablet:  (25 Jul 2019 01:30)  CeleBREX 200 mg oral capsule:  (25 Jul 2019 01:30)  KlonoPIN 1 mg oral tablet: 1 tab(s) orally 3 times a day (25 Jul 2019 01:30)  morphine 30 mg oral capsule:  (25 Jul 2019 01:30)  oxyCODONE-acetaminophen 10 mg-325 mg oral tablet: 1 tab(s) orally every 6 hours (25 Jul 2019 01:30)  traZODone 50 mg oral tablet: 0.5  orally (25 Jul 2019 01:30)  Zoloft: orally once a day (25 Jul 2019 01:30)      MEDICATIONS:  MEDICATIONS  (STANDING):  artificial  tears Solution 1 Drop(s) Both EYES daily  calcium carbonate 1250 mG  + Vitamin D (OsCal 500 + D) 1 Tablet(s) Oral daily  dexamethasone     Tablet   Oral   dexamethasone     Tablet 4 milliGRAM(s) Oral every 6 hours  dexamethasone     Tablet 2 milliGRAM(s) Oral every 6 hours  dextrose 5%. 1000 milliLiter(s) (50 mL/Hr) IV Continuous <Continuous>  dextrose 50% Injectable 12.5 Gram(s) IV Push once  dextrose 50% Injectable 25 Gram(s) IV Push once  dextrose 50% Injectable 25 Gram(s) IV Push once  diazepam    Tablet 5 milliGRAM(s) Oral every 8 hours  docusate sodium 100 milliGRAM(s) Oral three times a day  enoxaparin Injectable 40 milliGRAM(s) SubCutaneous at bedtime  famotidine    Tablet 20 milliGRAM(s) Oral every 12 hours  insulin lispro (HumaLOG) corrective regimen sliding scale   SubCutaneous Before meals and at bedtime  senna 2 Tablet(s) Oral at bedtime  sertraline 125 milliGRAM(s) Oral daily  traZODone 50 milliGRAM(s) Oral at bedtime    MEDICATIONS  (PRN):  acetaminophen   Tablet .. 650 milliGRAM(s) Oral every 6 hours PRN Temp greater or equal to 38C (100.4F), Moderate Pain (4 - 6)  clonazePAM  Tablet 1 milliGRAM(s) Oral every 8 hours PRN anxiety  dextrose 40% Gel 15 Gram(s) Oral once PRN Blood Glucose LESS THAN 70 milliGRAM(s)/deciliter  glucagon  Injectable 1 milliGRAM(s) IntraMuscular once PRN Glucose LESS THAN 70 milligrams/deciliter  magnesium hydroxide Suspension 30 milliLiter(s) Oral every 12 hours PRN Constipation  morphine  - Injectable 4 milliGRAM(s) IV Push every 4 hours PRN breakthrough pain  morphine  IR 15 milliGRAM(s) Oral every 4 hours PRN Moderate Pain (4 - 6)  morphine  IR 30 milliGRAM(s) Oral every 4 hours PRN Severe Pain (7 - 10)  naloxone Injectable 0.1 milliGRAM(s) IV Push every 3 minutes PRN For ANY of the following changes in patient status:  A. RR LESS THAN 10 breaths per minute, B. Oxygen saturation LESS THAN 90%, C. Sedation score of 6  ondansetron Injectable 4 milliGRAM(s) IV Push every 6 hours PRN Nausea      CULTURES:      RADIOLOGY & ADDITIONAL TESTS:      ASSESSMENT:  74y Female s/p day 4 T10-11 laminectomy, T9-12 posterior fusion.    PLAN:  -neuro/spinal checks  -Pain control: trazodone, morphine, valium   -Klonopin and sertraline for anxiety   -cont Ca and vit D   -dex taper   -monitor thoracic wound- plastics closure (Ronit Genao)  -JPx1 (superficial), HMVx1 (deep)- monitor outputs - dc HMV today. Keep KEVIN until output < 20ml in 24 hrs.  -SBP normotensive  -room air, satting well  -IVL   -reg diet   -bowel regimen  -GI ppx  -ISS  -continue home meds  -OOB/PT/OT  -d/w Dr. Retana       DVT PROPHYLAXIS:  [x] Venodynes                                [x] Heparin/Lovenox    DISPOSITION: home w/ home PT once drains removed      Assessment: present when checked     [] GCS   E   V   M     Heart Failure: [] Acute, [] acute on chronic, [] chronic   Heart Failure: [] Diastolic (HFpEF), [] Systolic (HRrEF), [] Combined (HFpEF and HFrEF), [] RHF, [] Pulm HTN, [] Other     [] LUIZA, [] ATN, [] AIN, [] other   [] CKD1, [] CKD2, [] CKD3, [] CKD4, [] CKD5, [] ESRD     Encephalopathy: [] Metabolic, [] Hepatic, [] Toxic, [] Neurological, [] Other     Abnormal Nutritional Status: [] malnutrition (see nutrition note), []underweight: BMI <19, [] morbid obesity: BMI >40, [] Cachexia     [] Sepsis   [] Hypovolemic shock, [] Cardiogenic shock, [] Hemorrhagic shock, [] Neurogenic shock   [] Acute respiratory failure   [] Cerebral edema, [] Brain compression / herniation   [] Functional quadriplegia   [] Acute blood loss anemia

## 2019-07-26 NOTE — DIETITIAN INITIAL EVALUATION ADULT. - OTHER INFO
75 y/o Female with hx anxiety, depression, osteoporosis, now s/p T10-11 laminectomy, T9-12 posterior fusion on 7/22. PT/OT following. Pt reports fair appetite at baseline, follows regular diet PTA, denies food allergies. Pt reports decreased appetite but beginning to improve today. Pt reports stable at UBW of 118lb. GI: denies N/V, last BM 7/25. Skin: Dewey score 20. +pain per pt. Encouraged increased PO intake with emphasis on consuming ONS- pt amenable to adding Ensure Enlive 1x/day. Please see below for full nutritional recs- d/w team. RD to monitor and f/u per protocol.

## 2019-07-27 LAB
ANION GAP SERPL CALC-SCNC: 7 MMOL/L — SIGNIFICANT CHANGE UP (ref 5–17)
BUN SERPL-MCNC: 20 MG/DL — SIGNIFICANT CHANGE UP (ref 7–23)
CALCIUM SERPL-MCNC: 9.4 MG/DL — SIGNIFICANT CHANGE UP (ref 8.4–10.5)
CHLORIDE SERPL-SCNC: 94 MMOL/L — LOW (ref 96–108)
CO2 SERPL-SCNC: 29 MMOL/L — SIGNIFICANT CHANGE UP (ref 22–31)
CREAT SERPL-MCNC: 0.81 MG/DL — SIGNIFICANT CHANGE UP (ref 0.5–1.3)
GLUCOSE BLDC GLUCOMTR-MCNC: 103 MG/DL — HIGH (ref 70–99)
GLUCOSE BLDC GLUCOMTR-MCNC: 106 MG/DL — HIGH (ref 70–99)
GLUCOSE BLDC GLUCOMTR-MCNC: 122 MG/DL — HIGH (ref 70–99)
GLUCOSE BLDC GLUCOMTR-MCNC: 47 MG/DL — LOW (ref 70–99)
GLUCOSE BLDC GLUCOMTR-MCNC: 75 MG/DL — SIGNIFICANT CHANGE UP (ref 70–99)
GLUCOSE SERPL-MCNC: 123 MG/DL — HIGH (ref 70–99)
POTASSIUM SERPL-MCNC: 5 MMOL/L — SIGNIFICANT CHANGE UP (ref 3.5–5.3)
POTASSIUM SERPL-SCNC: 5 MMOL/L — SIGNIFICANT CHANGE UP (ref 3.5–5.3)
SODIUM SERPL-SCNC: 130 MMOL/L — LOW (ref 135–145)

## 2019-07-27 RX ORDER — DEXTROSE 50 % IN WATER 50 %
50 SYRINGE (ML) INTRAVENOUS ONCE
Refills: 0 | Status: COMPLETED | OUTPATIENT
Start: 2019-07-27 | End: 2019-07-27

## 2019-07-27 RX ADMIN — MORPHINE SULFATE 30 MILLIGRAM(S): 50 CAPSULE, EXTENDED RELEASE ORAL at 12:26

## 2019-07-27 RX ADMIN — Medication 1 MILLIGRAM(S): at 18:17

## 2019-07-27 RX ADMIN — Medication 1 SPRAY(S): at 05:55

## 2019-07-27 RX ADMIN — Medication 50 MILLILITER(S): at 12:03

## 2019-07-27 RX ADMIN — SENNA PLUS 2 TABLET(S): 8.6 TABLET ORAL at 22:19

## 2019-07-27 RX ADMIN — MORPHINE SULFATE 4 MILLIGRAM(S): 50 CAPSULE, EXTENDED RELEASE ORAL at 18:40

## 2019-07-27 RX ADMIN — MORPHINE SULFATE 30 MILLIGRAM(S): 50 CAPSULE, EXTENDED RELEASE ORAL at 16:00

## 2019-07-27 RX ADMIN — POLYETHYLENE GLYCOL 3350 17 GRAM(S): 17 POWDER, FOR SOLUTION ORAL at 00:09

## 2019-07-27 RX ADMIN — Medication 5 MILLIGRAM(S): at 05:56

## 2019-07-27 RX ADMIN — Medication 100 MILLIGRAM(S): at 05:56

## 2019-07-27 RX ADMIN — MORPHINE SULFATE 15 MILLIGRAM(S): 50 CAPSULE, EXTENDED RELEASE ORAL at 22:20

## 2019-07-27 RX ADMIN — MORPHINE SULFATE 30 MILLIGRAM(S): 50 CAPSULE, EXTENDED RELEASE ORAL at 04:15

## 2019-07-27 RX ADMIN — Medication 2 MILLIGRAM(S): at 13:38

## 2019-07-27 RX ADMIN — MORPHINE SULFATE 30 MILLIGRAM(S): 50 CAPSULE, EXTENDED RELEASE ORAL at 03:43

## 2019-07-27 RX ADMIN — Medication 50 MILLIGRAM(S): at 22:19

## 2019-07-27 RX ADMIN — Medication 5 MILLIGRAM(S): at 18:16

## 2019-07-27 RX ADMIN — Medication 2 MILLIGRAM(S): at 00:09

## 2019-07-27 RX ADMIN — Medication 2 MILLIGRAM(S): at 05:55

## 2019-07-27 RX ADMIN — Medication 5 MILLIGRAM(S): at 09:42

## 2019-07-27 RX ADMIN — Medication 5 MILLIGRAM(S): at 22:18

## 2019-07-27 RX ADMIN — MORPHINE SULFATE 30 MILLIGRAM(S): 50 CAPSULE, EXTENDED RELEASE ORAL at 17:00

## 2019-07-27 RX ADMIN — MORPHINE SULFATE 15 MILLIGRAM(S): 50 CAPSULE, EXTENDED RELEASE ORAL at 23:00

## 2019-07-27 RX ADMIN — Medication 1 TABLET(S): at 09:42

## 2019-07-27 RX ADMIN — MORPHINE SULFATE 30 MILLIGRAM(S): 50 CAPSULE, EXTENDED RELEASE ORAL at 13:15

## 2019-07-27 RX ADMIN — MORPHINE SULFATE 4 MILLIGRAM(S): 50 CAPSULE, EXTENDED RELEASE ORAL at 18:14

## 2019-07-27 RX ADMIN — Medication 5 MILLIGRAM(S): at 00:09

## 2019-07-27 RX ADMIN — Medication 100 MILLIGRAM(S): at 22:18

## 2019-07-27 RX ADMIN — Medication 100 MILLIGRAM(S): at 12:25

## 2019-07-27 RX ADMIN — Medication 1 DROP(S): at 09:50

## 2019-07-27 RX ADMIN — Medication 1 SPRAY(S): at 18:16

## 2019-07-27 RX ADMIN — ENOXAPARIN SODIUM 40 MILLIGRAM(S): 100 INJECTION SUBCUTANEOUS at 22:19

## 2019-07-27 RX ADMIN — Medication 1 MILLIGRAM(S): at 09:50

## 2019-07-27 RX ADMIN — Medication 1 SPRAY(S): at 00:09

## 2019-07-27 RX ADMIN — SERTRALINE 125 MILLIGRAM(S): 25 TABLET, FILM COATED ORAL at 09:43

## 2019-07-27 RX ADMIN — FAMOTIDINE 20 MILLIGRAM(S): 10 INJECTION INTRAVENOUS at 05:55

## 2019-07-27 RX ADMIN — Medication 5 MILLIGRAM(S): at 13:35

## 2019-07-27 NOTE — PROVIDER CONTACT NOTE (MEDICATION) - SITUATION
s/p t10-t11,t9-o23mgnm psf finger stick glucose 47 alert oriented asymptomatic  neuro team aware order [id number]%

## 2019-07-27 NOTE — PROVIDER CONTACT NOTE (MEDICATION) - ACTION/TREATMENT ORDERED:
dextrose 505 LUNCH GIVEN POST F/S 103 TOLERATED MEAL, ENCOURAGE TO EAT dextrose 50%LUNCH GIVEN POST F/S 103 TOLERATED MEAL, ENCOURAGE TO EAT, cont to monitor f/s

## 2019-07-27 NOTE — PROGRESS NOTE ADULT - SUBJECTIVE AND OBJECTIVE BOX
HPI:  Taken from Dr. Retana's office note 7/17/2019  "Patient presents to be evaluated for surgical intervention for progressively worsening gait disturbance.  She reports a h/o cervical spine surgery, multiple lumbar spine procedures with the most recent x 3 years ago for scoliosis repair. Since her lower back surgery she has persistent lower back pain and most recent mid upper back pain with onset x 1 year ago.  She has undergone multiple conservative measures ranging from the use of oral pain medications (currently utilizes Percocet and Morphine tabs), NADIR's and as last resort a spinal cord stimulator trial which have all failed at provided lasting symptom relief.  She is off balance and best describes her gait a "walking on a train"" (22 Jul 2019 16:38)      Hospital course:   7/22: POD#0: s/p T10-11 lami, T9-12 fusion.  No overnight events.  7/23: POD#1: PCA Dilaudid discontinued. Pain well controlled.  7/24: POD#2: No major events overnight. Pain is well controlled.  7/25: POD #3: PAL overnight. Aquacel dressing changed today. Lightheaded with PT, will reattempt tomorrow. Pt recommending home w/ home PT   7/26: POD #4: No issues overnight, pain well controlled. HMV removed today.  7/27: POD#5: No issues overnight. KEVIN drain possible removal today pending PRS recs. Pending BM. Started on flonase for congestion.     Vital Signs Last 24 Hrs  T(C): 37 (27 Jul 2019 00:06), Max: 37 (27 Jul 2019 00:06)  T(F): 98.6 (27 Jul 2019 00:06), Max: 98.6 (27 Jul 2019 00:06)  HR: 65 (27 Jul 2019 00:06) (65 - 111)  BP: 111/69 (26 Jul 2019 20:37) (110/58 - 131/61)  BP(mean): --  RR: 18 (27 Jul 2019 00:06) (18 - 24)  SpO2: 100% (27 Jul 2019 00:06) (93% - 100%)    I&O's Summary    25 Jul 2019 07:01  -  26 Jul 2019 07:00  --------------------------------------------------------  IN: 1100 mL / OUT: 935 mL / NET: 165 mL    26 Jul 2019 07:01  -  27 Jul 2019 00:16  --------------------------------------------------------  IN: 720 mL / OUT: 555 mL / NET: 165 mL        PHYSICAL EXAM:  Neurological: AAOx3, NAD, coherent speech, following commands  CNII-XII grossly intact  MAEx4, strength 4/5 LE b/l, 5/5 UE b/l, no drift  SILT throughout b/l  Incision/wound: thoracic incision clean, dry and intact; aquacell  dressing in place  +KEVIN (superficial)    TUBES/LINES:  [] Ybarra  [] A-line  [] Lumbar Drain  [x] Wound Drains: KEVIN as per PRS   [] NGT   [] EVD   [] CVC  [] Other      DIET:  [] NPO  [x] Mechanical  [] Tube feeds    LABS:                        9.0    15.98 )-----------( 283      ( 26 Jul 2019 08:15 )             28.9     07-26    133<L>  |  99  |  15  ----------------------------<  130<H>  3.6   |  25  |  0.62    Ca    8.6      26 Jul 2019 08:15  Phos  2.1     07-26  Mg     2.0     07-26              CAPILLARY BLOOD GLUCOSE      POCT Blood Glucose.: 85 mg/dL (26 Jul 2019 21:20)  POCT Blood Glucose.: 91 mg/dL (26 Jul 2019 17:29)  POCT Blood Glucose.: 77 mg/dL (26 Jul 2019 11:49)  POCT Blood Glucose.: 86 mg/dL (26 Jul 2019 06:54)      Drug Levels: [] N/A    CSF Analysis: [] N/A      Allergies    iodine (Unknown)    Intolerances        Home Medications:  Calcium 600+D oral tablet:  (25 Jul 2019 01:30)  CeleBREX 200 mg oral capsule:  (25 Jul 2019 01:30)  KlonoPIN 1 mg oral tablet: 1 tab(s) orally 3 times a day (25 Jul 2019 01:30)  morphine 30 mg oral capsule:  (25 Jul 2019 01:30)  oxyCODONE-acetaminophen 10 mg-325 mg oral tablet: 1 tab(s) orally every 6 hours (25 Jul 2019 01:30)  traZODone 50 mg oral tablet: 0.5  orally (25 Jul 2019 01:30)  Zoloft: orally once a day (25 Jul 2019 01:30)      MEDICATIONS:  MEDICATIONS  (STANDING):  artificial  tears Solution 1 Drop(s) Both EYES daily  bisacodyl 5 milliGRAM(s) Oral every 12 hours  bisacodyl Suppository 10 milliGRAM(s) Rectal daily  calcium carbonate 1250 mG  + Vitamin D (OsCal 500 + D) 1 Tablet(s) Oral daily  dexamethasone     Tablet   Oral   dexamethasone     Tablet 2 milliGRAM(s) Oral every 6 hours  dexamethasone     Tablet 1 milliGRAM(s) Oral every 6 hours  dextrose 5%. 1000 milliLiter(s) (50 mL/Hr) IV Continuous <Continuous>  dextrose 50% Injectable 12.5 Gram(s) IV Push once  dextrose 50% Injectable 25 Gram(s) IV Push once  dextrose 50% Injectable 25 Gram(s) IV Push once  diazepam    Tablet 5 milliGRAM(s) Oral every 8 hours  docusate sodium 100 milliGRAM(s) Oral three times a day  enoxaparin Injectable 40 milliGRAM(s) SubCutaneous at bedtime  famotidine    Tablet 20 milliGRAM(s) Oral every 12 hours  fluticasone propionate 50 MICROgram(s)/spray Nasal Spray 1 Spray(s) Both Nostrils two times a day  insulin lispro (HumaLOG) corrective regimen sliding scale   SubCutaneous Before meals and at bedtime  senna 2 Tablet(s) Oral at bedtime  sertraline 125 milliGRAM(s) Oral daily  traZODone 50 milliGRAM(s) Oral at bedtime    MEDICATIONS  (PRN):  acetaminophen   Tablet .. 650 milliGRAM(s) Oral every 6 hours PRN Temp greater or equal to 38C (100.4F), Moderate Pain (4 - 6)  clonazePAM  Tablet 1 milliGRAM(s) Oral every 8 hours PRN anxiety  dextrose 40% Gel 15 Gram(s) Oral once PRN Blood Glucose LESS THAN 70 milliGRAM(s)/deciliter  glucagon  Injectable 1 milliGRAM(s) IntraMuscular once PRN Glucose LESS THAN 70 milligrams/deciliter  magnesium hydroxide Suspension 30 milliLiter(s) Oral every 12 hours PRN Constipation  morphine  - Injectable 4 milliGRAM(s) IV Push every 4 hours PRN breakthrough pain  morphine  IR 15 milliGRAM(s) Oral every 4 hours PRN Moderate Pain (4 - 6)  morphine  IR 30 milliGRAM(s) Oral every 4 hours PRN Severe Pain (7 - 10)  naloxone Injectable 0.1 milliGRAM(s) IV Push every 3 minutes PRN For ANY of the following changes in patient status:  A. RR LESS THAN 10 breaths per minute, B. Oxygen saturation LESS THAN 90%, C. Sedation score of 6  ondansetron Injectable 4 milliGRAM(s) IV Push every 6 hours PRN Nausea  polyethylene glycol 3350 17 Gram(s) Oral daily PRN Constipation      CULTURES:      RADIOLOGY & ADDITIONAL TESTS:      ASSESSMENT:  74y Female s/p day 4 T10-11 laminectomy, T9-12 posterior fusion.    PLAN:  -neuro/spinal checks  -Pain control: trazodone, morphine, valium   -Klonopin and sertraline for anxiety   -cont Ca and vit D   -dex taper   -monitor thoracic wound- plastics closure (Ronit Genao)  -JPx1 (superficial) monitor output. Keep KEVIN until output < 20ml in 24 hrs.  -SBP normotensive  -room air, satting well  -IVL   -reg diet   -bowel regimen, pending BM  -GI ppx  -ISS  -continue home meds  -OOB/PT/OT  -d/w Dr. Retana       DVT PROPHYLAXIS:  [x] Venodynes                                [x] Heparin/Lovenox    DISPOSITION: home w/ home PT once drains removed      Assessment: present when checked     [] GCS   E   V   M     Heart Failure: [] Acute, [] acute on chronic, [] chronic   Heart Failure: [] Diastolic (HFpEF), [] Systolic (HRrEF), [] Combined (HFpEF and HFrEF), [] RHF, [] Pulm HTN, [] Other     [] LUIZA, [] ATN, [] AIN, [] other   [] CKD1, [] CKD2, [] CKD3, [] CKD4, [] CKD5, [] ESRD     Encephalopathy: [] Metabolic, [] Hepatic, [] Toxic, [] Neurological, [] Other     Abnormal Nutritional Status: [] malnutrition (see nutrition note), []underweight: BMI <19, [] morbid obesity: BMI >40, [] Cachexia     [] Sepsis   [] Hypovolemic shock, [] Cardiogenic shock, [] Hemorrhagic shock, [] Neurogenic shock   [] Acute respiratory failure   [] Cerebral edema, [] Brain compression / herniation   [] Functional quadriplegia   [] Acute blood loss anemia

## 2019-07-28 LAB
ANION GAP SERPL CALC-SCNC: 7 MMOL/L — SIGNIFICANT CHANGE UP (ref 5–17)
ANION GAP SERPL CALC-SCNC: 7 MMOL/L — SIGNIFICANT CHANGE UP (ref 5–17)
ANION GAP SERPL CALC-SCNC: 8 MMOL/L — SIGNIFICANT CHANGE UP (ref 5–17)
B-OH-BUTYR SERPL-SCNC: 0.1 MMOL/L — SIGNIFICANT CHANGE UP
BUN SERPL-MCNC: 14 MG/DL — SIGNIFICANT CHANGE UP (ref 7–23)
BUN SERPL-MCNC: 16 MG/DL — SIGNIFICANT CHANGE UP (ref 7–23)
BUN SERPL-MCNC: 16 MG/DL — SIGNIFICANT CHANGE UP (ref 7–23)
CALCIUM SERPL-MCNC: 8.2 MG/DL — LOW (ref 8.4–10.5)
CALCIUM SERPL-MCNC: 8.2 MG/DL — LOW (ref 8.4–10.5)
CALCIUM SERPL-MCNC: 8.4 MG/DL — SIGNIFICANT CHANGE UP (ref 8.4–10.5)
CHLORIDE SERPL-SCNC: 100 MMOL/L — SIGNIFICANT CHANGE UP (ref 96–108)
CHLORIDE SERPL-SCNC: 97 MMOL/L — SIGNIFICANT CHANGE UP (ref 96–108)
CHLORIDE SERPL-SCNC: 98 MMOL/L — SIGNIFICANT CHANGE UP (ref 96–108)
CO2 SERPL-SCNC: 27 MMOL/L — SIGNIFICANT CHANGE UP (ref 22–31)
CO2 SERPL-SCNC: 27 MMOL/L — SIGNIFICANT CHANGE UP (ref 22–31)
CO2 SERPL-SCNC: 28 MMOL/L — SIGNIFICANT CHANGE UP (ref 22–31)
CREAT SERPL-MCNC: 0.53 MG/DL — SIGNIFICANT CHANGE UP (ref 0.5–1.3)
CREAT SERPL-MCNC: 0.57 MG/DL — SIGNIFICANT CHANGE UP (ref 0.5–1.3)
CREAT SERPL-MCNC: 0.59 MG/DL — SIGNIFICANT CHANGE UP (ref 0.5–1.3)
GLUCOSE BLDC GLUCOMTR-MCNC: 100 MG/DL — HIGH (ref 70–99)
GLUCOSE BLDC GLUCOMTR-MCNC: 116 MG/DL — HIGH (ref 70–99)
GLUCOSE BLDC GLUCOMTR-MCNC: 17 MG/DL — CRITICAL LOW (ref 70–99)
GLUCOSE BLDC GLUCOMTR-MCNC: 204 MG/DL — HIGH (ref 70–99)
GLUCOSE BLDC GLUCOMTR-MCNC: 209 MG/DL — HIGH (ref 70–99)
GLUCOSE BLDC GLUCOMTR-MCNC: 45 MG/DL — CRITICAL LOW (ref 70–99)
GLUCOSE BLDC GLUCOMTR-MCNC: 46 MG/DL — LOW (ref 70–99)
GLUCOSE BLDC GLUCOMTR-MCNC: 50 MG/DL — LOW (ref 70–99)
GLUCOSE BLDC GLUCOMTR-MCNC: 55 MG/DL — LOW (ref 70–99)
GLUCOSE BLDC GLUCOMTR-MCNC: 56 MG/DL — LOW (ref 70–99)
GLUCOSE SERPL-MCNC: 82 MG/DL — SIGNIFICANT CHANGE UP (ref 70–99)
GLUCOSE SERPL-MCNC: 89 MG/DL — SIGNIFICANT CHANGE UP (ref 70–99)
GLUCOSE SERPL-MCNC: 94 MG/DL — SIGNIFICANT CHANGE UP (ref 70–99)
HCT VFR BLD CALC: 24.1 % — LOW (ref 34.5–45)
HCT VFR BLD CALC: 24.2 % — LOW (ref 34.5–45)
HGB BLD-MCNC: 7.6 G/DL — LOW (ref 11.5–15.5)
HGB BLD-MCNC: 7.6 G/DL — LOW (ref 11.5–15.5)
MAGNESIUM SERPL-MCNC: 2 MG/DL — SIGNIFICANT CHANGE UP (ref 1.6–2.6)
MCHC RBC-ENTMCNC: 29.2 PG — SIGNIFICANT CHANGE UP (ref 27–34)
MCHC RBC-ENTMCNC: 29.2 PG — SIGNIFICANT CHANGE UP (ref 27–34)
MCHC RBC-ENTMCNC: 31.4 GM/DL — LOW (ref 32–36)
MCHC RBC-ENTMCNC: 31.5 GM/DL — LOW (ref 32–36)
MCV RBC AUTO: 92.7 FL — SIGNIFICANT CHANGE UP (ref 80–100)
MCV RBC AUTO: 93.1 FL — SIGNIFICANT CHANGE UP (ref 80–100)
NRBC # BLD: 0 /100 WBCS — SIGNIFICANT CHANGE UP (ref 0–0)
NRBC # BLD: 0 /100 WBCS — SIGNIFICANT CHANGE UP (ref 0–0)
PHOSPHATE SERPL-MCNC: 3.4 MG/DL — SIGNIFICANT CHANGE UP (ref 2.5–4.5)
PLATELET # BLD AUTO: 281 K/UL — SIGNIFICANT CHANGE UP (ref 150–400)
PLATELET # BLD AUTO: 300 K/UL — SIGNIFICANT CHANGE UP (ref 150–400)
POTASSIUM SERPL-MCNC: 4.1 MMOL/L — SIGNIFICANT CHANGE UP (ref 3.5–5.3)
POTASSIUM SERPL-MCNC: 4.5 MMOL/L — SIGNIFICANT CHANGE UP (ref 3.5–5.3)
POTASSIUM SERPL-MCNC: 4.6 MMOL/L — SIGNIFICANT CHANGE UP (ref 3.5–5.3)
POTASSIUM SERPL-SCNC: 4.1 MMOL/L — SIGNIFICANT CHANGE UP (ref 3.5–5.3)
POTASSIUM SERPL-SCNC: 4.5 MMOL/L — SIGNIFICANT CHANGE UP (ref 3.5–5.3)
POTASSIUM SERPL-SCNC: 4.6 MMOL/L — SIGNIFICANT CHANGE UP (ref 3.5–5.3)
RBC # BLD: 2.6 M/UL — LOW (ref 3.8–5.2)
RBC # BLD: 2.6 M/UL — LOW (ref 3.8–5.2)
RBC # FLD: 14.6 % — HIGH (ref 10.3–14.5)
RBC # FLD: 14.9 % — HIGH (ref 10.3–14.5)
SODIUM SERPL-SCNC: 131 MMOL/L — LOW (ref 135–145)
SODIUM SERPL-SCNC: 133 MMOL/L — LOW (ref 135–145)
SODIUM SERPL-SCNC: 135 MMOL/L — SIGNIFICANT CHANGE UP (ref 135–145)
WBC # BLD: 11.97 K/UL — HIGH (ref 3.8–10.5)
WBC # BLD: 15.77 K/UL — HIGH (ref 3.8–10.5)
WBC # FLD AUTO: 11.97 K/UL — HIGH (ref 3.8–10.5)
WBC # FLD AUTO: 15.77 K/UL — HIGH (ref 3.8–10.5)

## 2019-07-28 PROCEDURE — 99222 1ST HOSP IP/OBS MODERATE 55: CPT

## 2019-07-28 RX ORDER — SODIUM CHLORIDE 5 G/100ML
1000 INJECTION, SOLUTION INTRAVENOUS
Refills: 0 | Status: DISCONTINUED | OUTPATIENT
Start: 2019-07-28 | End: 2019-07-28

## 2019-07-28 RX ORDER — DEXTROSE 50 % IN WATER 50 %
12.5 SYRINGE (ML) INTRAVENOUS ONCE
Refills: 0 | Status: DISCONTINUED | OUTPATIENT
Start: 2019-07-28 | End: 2019-08-02

## 2019-07-28 RX ORDER — DEXTROSE 50 % IN WATER 50 %
25 SYRINGE (ML) INTRAVENOUS ONCE
Refills: 0 | Status: COMPLETED | OUTPATIENT
Start: 2019-07-28 | End: 2019-07-28

## 2019-07-28 RX ADMIN — SERTRALINE 125 MILLIGRAM(S): 25 TABLET, FILM COATED ORAL at 11:42

## 2019-07-28 RX ADMIN — Medication 25 GRAM(S): at 12:08

## 2019-07-28 RX ADMIN — Medication 5 MILLIGRAM(S): at 06:17

## 2019-07-28 RX ADMIN — Medication 100 MILLIGRAM(S): at 14:44

## 2019-07-28 RX ADMIN — MORPHINE SULFATE 30 MILLIGRAM(S): 50 CAPSULE, EXTENDED RELEASE ORAL at 21:30

## 2019-07-28 RX ADMIN — Medication 1 MILLIGRAM(S): at 22:48

## 2019-07-28 RX ADMIN — Medication 1 MILLIGRAM(S): at 00:16

## 2019-07-28 RX ADMIN — Medication 1 SPRAY(S): at 06:17

## 2019-07-28 RX ADMIN — Medication 5 MILLIGRAM(S): at 06:16

## 2019-07-28 RX ADMIN — ENOXAPARIN SODIUM 40 MILLIGRAM(S): 100 INJECTION SUBCUTANEOUS at 21:08

## 2019-07-28 RX ADMIN — MORPHINE SULFATE 15 MILLIGRAM(S): 50 CAPSULE, EXTENDED RELEASE ORAL at 14:50

## 2019-07-28 RX ADMIN — MORPHINE SULFATE 30 MILLIGRAM(S): 50 CAPSULE, EXTENDED RELEASE ORAL at 21:09

## 2019-07-28 RX ADMIN — MORPHINE SULFATE 30 MILLIGRAM(S): 50 CAPSULE, EXTENDED RELEASE ORAL at 09:46

## 2019-07-28 RX ADMIN — Medication 50 MILLIGRAM(S): at 21:08

## 2019-07-28 RX ADMIN — MORPHINE SULFATE 30 MILLIGRAM(S): 50 CAPSULE, EXTENDED RELEASE ORAL at 10:45

## 2019-07-28 RX ADMIN — Medication 1 MILLIGRAM(S): at 06:17

## 2019-07-28 RX ADMIN — MORPHINE SULFATE 15 MILLIGRAM(S): 50 CAPSULE, EXTENDED RELEASE ORAL at 15:50

## 2019-07-28 RX ADMIN — Medication 1 MILLIGRAM(S): at 11:41

## 2019-07-28 RX ADMIN — Medication 5 MILLIGRAM(S): at 21:08

## 2019-07-28 RX ADMIN — Medication 100 MILLIGRAM(S): at 06:16

## 2019-07-28 RX ADMIN — Medication 1 TABLET(S): at 11:41

## 2019-07-28 RX ADMIN — SODIUM CHLORIDE 50 MILLILITER(S): 5 INJECTION, SOLUTION INTRAVENOUS at 01:16

## 2019-07-28 RX ADMIN — Medication 1 MILLIGRAM(S): at 11:42

## 2019-07-28 RX ADMIN — Medication 5 MILLIGRAM(S): at 16:54

## 2019-07-28 RX ADMIN — Medication 10 MILLIGRAM(S): at 11:42

## 2019-07-28 NOTE — PROGRESS NOTE ADULT - SUBJECTIVE AND OBJECTIVE BOX
HPI:  Taken from Dr. Retana's office note 7/17/2019  "Patient presents to be evaluated for surgical intervention for progressively worsening gait disturbance.  She reports a h/o cervical spine surgery, multiple lumbar spine procedures with the most recent x 3 years ago for scoliosis repair. Since her lower back surgery she has persistent lower back pain and most recent mid upper back pain with onset x 1 year ago.  She has undergone multiple conservative measures ranging from the use of oral pain medications (currently utilizes Percocet and Morphine tabs), NADIR's and as last resort a spinal cord stimulator trial which have all failed at provided lasting symptom relief.  She is off balance and best describes her gait a "walking on a train"" (22 Jul 2019 16:38)      Hospital course:   7/22: POD#0: s/p T10-11 lami, T9-12 fusion.  No overnight events.  7/23: POD#1: PCA Dilaudid discontinued. Pain well controlled.  7/24: POD#2: No major events overnight. Pain is well controlled.  7/25: POD #3: PAL overnight. Aquacel dressing changed today. Lightheaded with PT, will reattempt tomorrow. Pt recommending home w/ home PT   7/26: POD #4: No issues overnight, pain well controlled. HMV removed today.  7/27: POD#5: No issues overnight. KEVIN drain possible removal today pending PRS recs. Pending BM. Started on flonase for congestion.   7/28: POD#6: No major events overnight. Sinus congestion improved with flonase. No hypoglycemia overnight. superficial KEVIN drain in place.      OVERNIGHT EVENTS: No significant issues overnight.  Vital Signs Last 24 Hrs  T(C): 36.4 (28 Jul 2019 00:05), Max: 36.7 (27 Jul 2019 06:02)  T(F): 97.6 (28 Jul 2019 00:05), Max: 98.1 (27 Jul 2019 06:02)  HR: 84 (27 Jul 2019 20:59) (74 - 86)  BP: 131/61 (27 Jul 2019 20:59) (110/54 - 148/69)  BP(mean): --  RR: 18 (27 Jul 2019 20:59) (16 - 18)  SpO2: 95% (27 Jul 2019 20:59) (95% - 100%)    I&O's Summary    26 Jul 2019 07:01  -  27 Jul 2019 07:00  --------------------------------------------------------  IN: 1520 mL / OUT: 1545 mL / NET: -25 mL    27 Jul 2019 07:01  -  28 Jul 2019 00:15  --------------------------------------------------------  IN: 200 mL / OUT: 640 mL / NET: -440 mL        PHYSICAL EXAM:  Neurological: A&O x 3. CNII to XI grossly intact.  Ext:  strength 4/5 LE b/l, 5/5 UE b/l, no drift  SILT throughout b/l  Back: surgical wound is dry and clean. Aqua cell dressing in place.  superficial KEVIN drain place.        DIET: Regular    LABS:                        9.0    15.98 )-----------( 283      ( 26 Jul 2019 08:15 )             28.9     07-27    130<L>  |  94<L>  |  20  ----------------------------<  123<H>  5.0   |  29  |  0.81    Ca    9.4      27 Jul 2019 20:24  Phos  2.1     07-26  Mg     2.0     07-26              CAPILLARY BLOOD GLUCOSE      POCT Blood Glucose.: 122 mg/dL (27 Jul 2019 22:36)  POCT Blood Glucose.: 106 mg/dL (27 Jul 2019 17:42)  POCT Blood Glucose.: 103 mg/dL (27 Jul 2019 12:37)  POCT Blood Glucose.: 47 mg/dL (27 Jul 2019 11:26)  POCT Blood Glucose.: 75 mg/dL (27 Jul 2019 07:04)      Drug Levels: [] N/A    CSF Analysis: [] N/A      Allergies    iodine (Unknown)    Intolerances      MEDICATIONS:  Antibiotics:    Neuro:  acetaminophen   Tablet .. 650 milliGRAM(s) Oral every 6 hours PRN  clonazePAM  Tablet 1 milliGRAM(s) Oral every 8 hours PRN  diazepam    Tablet 5 milliGRAM(s) Oral every 8 hours  morphine  - Injectable 4 milliGRAM(s) IV Push every 4 hours PRN  morphine  IR 15 milliGRAM(s) Oral every 4 hours PRN  morphine  IR 30 milliGRAM(s) Oral every 4 hours PRN  ondansetron Injectable 4 milliGRAM(s) IV Push every 6 hours PRN  sertraline 125 milliGRAM(s) Oral daily  traZODone 50 milliGRAM(s) Oral at bedtime    Anticoagulation:  enoxaparin Injectable 40 milliGRAM(s) SubCutaneous at bedtime    OTHER:  artificial  tears Solution 1 Drop(s) Both EYES daily  bisacodyl 5 milliGRAM(s) Oral every 12 hours  bisacodyl Suppository 10 milliGRAM(s) Rectal daily  dexamethasone     Tablet   Oral   dexamethasone     Tablet 1 milliGRAM(s) Oral every 6 hours  dextrose 40% Gel 15 Gram(s) Oral once PRN  dextrose 50% Injectable 12.5 Gram(s) IV Push once  dextrose 50% Injectable 25 Gram(s) IV Push once  dextrose 50% Injectable 25 Gram(s) IV Push once  docusate sodium 100 milliGRAM(s) Oral three times a day  fluticasone propionate 50 MICROgram(s)/spray Nasal Spray 1 Spray(s) Both Nostrils two times a day  glucagon  Injectable 1 milliGRAM(s) IntraMuscular once PRN  insulin lispro (HumaLOG) corrective regimen sliding scale   SubCutaneous Before meals and at bedtime  magnesium hydroxide Suspension 30 milliLiter(s) Oral every 12 hours PRN  naloxone Injectable 0.1 milliGRAM(s) IV Push every 3 minutes PRN  polyethylene glycol 3350 17 Gram(s) Oral daily PRN  senna 2 Tablet(s) Oral at bedtime    IVF:  calcium carbonate 1250 mG  + Vitamin D (OsCal 500 + D) 1 Tablet(s) Oral daily  dextrose 5%. 1000 milliLiter(s) IV Continuous <Continuous>    CULTURES:    RADIOLOGY & ADDITIONAL TESTS:      ASSESSMENT:  74y Female s/p day 6 T10-11 laminectomy, T9-12 posterior fusion.    M47.14  Handoff  MEWS Score  Anxiety  Depression  Osteoporosis  Suspected deep vein thrombosis (DVT)  Back pain  History of neck surgery  History of back surgery  History of lumbar fusion      PLAN:    -neuro/spinal checks  -Pain control: trazodone, morphine, valium   -Klonopin and sertraline for anxiety   -cont Ca and vit D   -dex taper   -monitor thoracic wound- plastics closure (Ronit Genao)  -JPx1 (superficial) monitor output. Keep KEVIN until output < 20ml in 24 hrs.  -SBP normotensive  -room air, satting well    DVT PROPHYLAXIS:  [x] Venodynes                                [x] Heparin/Lovenox    DISPOSITION: Home with home PT.     Assessment:  Present when checked    []  GCS  E   V  M     Heart Failure: []Acute, [] acute on chronic , []chronic  Heart Failure:  [] Diastolic (HFpEF), [] Systolic (HFrEF), []Combined (HFpEF and HFrEF), [] RHF, [] Pulm HTN, [] Other    [] LUIZA, [] ATN, [] AIN, [] other  [] CKD1, [] CKD2, [] CKD 3, [] CKD 4, [] CKD 5, []ESRD    Encephalopathy: [] Metabolic, [] Hepatic, [] toxic, [] Neurological, [] Other    Abnormal Nurtitional Status: [] malnurtition (see nutrition note), [ ]underweight: BMI < 19, [] morbid obesity: BMI >40, [] Cachexia    [] Sepsis  [] hypovolemic shock,[] cardiogenic shock, [] hemorrhagic shock, [] neuogenic shock  [] Acute Respiratory Failure  []Cerebral edema, [] Brain compression/ herniation,   [] Functional quadriplegia  [] Acute blood loss anemia HPI:  Taken from Dr. Retana's office note 7/17/2019  "Patient presents to be evaluated for surgical intervention for progressively worsening gait disturbance.  She reports a h/o cervical spine surgery, multiple lumbar spine procedures with the most recent x 3 years ago for scoliosis repair. Since her lower back surgery she has persistent lower back pain and most recent mid upper back pain with onset x 1 year ago.  She has undergone multiple conservative measures ranging from the use of oral pain medications (currently utilizes Percocet and Morphine tabs), NADIR's and as last resort a spinal cord stimulator trial which have all failed at provided lasting symptom relief.  She is off balance and best describes her gait a "walking on a train"" (22 Jul 2019 16:38)      Hospital course:   7/22: POD#0: s/p T10-11 lami, T9-12 fusion.  No overnight events.  7/23: POD#1: PCA Dilaudid discontinued. Pain well controlled.  7/24: POD#2: No major events overnight. Pain is well controlled.  7/25: POD #3: PAL overnight. Aquacel dressing changed today. Lightheaded with PT, will reattempt tomorrow. Pt recommending home w/ home PT   7/26: POD #4: No issues overnight, pain well controlled. HMV removed today.  7/27: POD#5: No issues overnight. KEVIN drain possible removal today pending PRS recs. Pending BM. Started on flonase for congestion.   7/28: POD#6: No major events overnight. Sinus congestion improved with flonase. No hypoglycemia overnight. superficial KEVIN drain in place.      OVERNIGHT EVENTS: No significant issues overnight.  Vital Signs Last 24 Hrs  T(C): 36.4 (28 Jul 2019 00:05), Max: 36.7 (27 Jul 2019 06:02)  T(F): 97.6 (28 Jul 2019 00:05), Max: 98.1 (27 Jul 2019 06:02)  HR: 84 (27 Jul 2019 20:59) (74 - 86)  BP: 131/61 (27 Jul 2019 20:59) (110/54 - 148/69)  BP(mean): --  RR: 18 (27 Jul 2019 20:59) (16 - 18)  SpO2: 95% (27 Jul 2019 20:59) (95% - 100%)    I&O's Summary    26 Jul 2019 07:01  -  27 Jul 2019 07:00  --------------------------------------------------------  IN: 1520 mL / OUT: 1545 mL / NET: -25 mL    27 Jul 2019 07:01  -  28 Jul 2019 00:15  --------------------------------------------------------  IN: 200 mL / OUT: 640 mL / NET: -440 mL        PHYSICAL EXAM:  Neurological: A&O x 3. CNII to XI grossly intact.  Ext:  strength 4/5 LE b/l, 5/5 UE b/l, no drift  SILT throughout b/l  Back: surgical wound is dry and clean. Aqua cell dressing in place.  superficial KEVIN drain place.        DIET: Regular    LABS:                        9.0    15.98 )-----------( 283      ( 26 Jul 2019 08:15 )             28.9     07-27    130<L>  |  94<L>  |  20  ----------------------------<  123<H>  5.0   |  29  |  0.81    Ca    9.4      27 Jul 2019 20:24  Phos  2.1     07-26  Mg     2.0     07-26              CAPILLARY BLOOD GLUCOSE      POCT Blood Glucose.: 122 mg/dL (27 Jul 2019 22:36)  POCT Blood Glucose.: 106 mg/dL (27 Jul 2019 17:42)  POCT Blood Glucose.: 103 mg/dL (27 Jul 2019 12:37)  POCT Blood Glucose.: 47 mg/dL (27 Jul 2019 11:26)  POCT Blood Glucose.: 75 mg/dL (27 Jul 2019 07:04)      Drug Levels: [] N/A    CSF Analysis: [] N/A      Allergies    iodine (Unknown)    Intolerances      MEDICATIONS:  Antibiotics:    Neuro:  acetaminophen   Tablet .. 650 milliGRAM(s) Oral every 6 hours PRN  clonazePAM  Tablet 1 milliGRAM(s) Oral every 8 hours PRN  diazepam    Tablet 5 milliGRAM(s) Oral every 8 hours  morphine  - Injectable 4 milliGRAM(s) IV Push every 4 hours PRN  morphine  IR 15 milliGRAM(s) Oral every 4 hours PRN  morphine  IR 30 milliGRAM(s) Oral every 4 hours PRN  ondansetron Injectable 4 milliGRAM(s) IV Push every 6 hours PRN  sertraline 125 milliGRAM(s) Oral daily  traZODone 50 milliGRAM(s) Oral at bedtime    Anticoagulation:  enoxaparin Injectable 40 milliGRAM(s) SubCutaneous at bedtime    OTHER:  artificial  tears Solution 1 Drop(s) Both EYES daily  bisacodyl 5 milliGRAM(s) Oral every 12 hours  bisacodyl Suppository 10 milliGRAM(s) Rectal daily  dexamethasone     Tablet   Oral   dexamethasone     Tablet 1 milliGRAM(s) Oral every 6 hours  dextrose 40% Gel 15 Gram(s) Oral once PRN  dextrose 50% Injectable 12.5 Gram(s) IV Push once  dextrose 50% Injectable 25 Gram(s) IV Push once  dextrose 50% Injectable 25 Gram(s) IV Push once  docusate sodium 100 milliGRAM(s) Oral three times a day  fluticasone propionate 50 MICROgram(s)/spray Nasal Spray 1 Spray(s) Both Nostrils two times a day  glucagon  Injectable 1 milliGRAM(s) IntraMuscular once PRN  insulin lispro (HumaLOG) corrective regimen sliding scale   SubCutaneous Before meals and at bedtime  magnesium hydroxide Suspension 30 milliLiter(s) Oral every 12 hours PRN  naloxone Injectable 0.1 milliGRAM(s) IV Push every 3 minutes PRN  polyethylene glycol 3350 17 Gram(s) Oral daily PRN  senna 2 Tablet(s) Oral at bedtime    IVF:  calcium carbonate 1250 mG  + Vitamin D (OsCal 500 + D) 1 Tablet(s) Oral daily  dextrose 5%. 1000 milliLiter(s) IV Continuous <Continuous>    CULTURES:    RADIOLOGY & ADDITIONAL TESTS:      ASSESSMENT:  74y Female s/p day 6 T10-11 laminectomy, T9-12 posterior fusion.    M47.14  Handoff  MEWS Score  Anxiety  Depression  Osteoporosis  Suspected deep vein thrombosis (DVT)  Back pain  History of neck surgery  History of back surgery  History of lumbar fusion      PLAN:    -neuro/spinal checks  -Pain control: trazodone, morphine, valium   -Klonopin and sertraline for anxiety   -cont Ca and vit D   -dex taper   -monitor thoracic wound- plastics closure (Ronit Genao)  -JPx1 (superficial) monitor output. Keep KEVIN until output < 20ml in 24 hrs.  -SBP normotensive  -room air, satting well  - monitor hypoglycemia  - monitor hyponatremia.    DVT PROPHYLAXIS:  [x] Venodynes                                [x] Heparin/Lovenox    DISPOSITION: Home with home PT.     Assessment:  Present when checked    []  GCS  E   V  M     Heart Failure: []Acute, [] acute on chronic , []chronic  Heart Failure:  [] Diastolic (HFpEF), [] Systolic (HFrEF), []Combined (HFpEF and HFrEF), [] RHF, [] Pulm HTN, [] Other    [] LUIZA, [] ATN, [] AIN, [] other  [] CKD1, [] CKD2, [] CKD 3, [] CKD 4, [] CKD 5, []ESRD    Encephalopathy: [] Metabolic, [] Hepatic, [] toxic, [] Neurological, [] Other    Abnormal Nurtitional Status: [] malnurtition (see nutrition note), [ ]underweight: BMI < 19, [] morbid obesity: BMI >40, [] Cachexia    [] Sepsis  [] hypovolemic shock,[] cardiogenic shock, [] hemorrhagic shock, [] neuogenic shock  [] Acute Respiratory Failure  []Cerebral edema, [] Brain compression/ herniation,   [] Functional quadriplegia  [] Acute blood loss anemia

## 2019-07-29 DIAGNOSIS — E87.1 HYPO-OSMOLALITY AND HYPONATREMIA: ICD-10-CM

## 2019-07-29 DIAGNOSIS — D50.0 IRON DEFICIENCY ANEMIA SECONDARY TO BLOOD LOSS (CHRONIC): ICD-10-CM

## 2019-07-29 DIAGNOSIS — Z98.890 OTHER SPECIFIED POSTPROCEDURAL STATES: ICD-10-CM

## 2019-07-29 DIAGNOSIS — E16.2 HYPOGLYCEMIA, UNSPECIFIED: ICD-10-CM

## 2019-07-29 LAB
ANION GAP SERPL CALC-SCNC: 7 MMOL/L — SIGNIFICANT CHANGE UP (ref 5–17)
BUN SERPL-MCNC: 10 MG/DL — SIGNIFICANT CHANGE UP (ref 7–23)
C PEPTIDE SERPL-MCNC: 2.4 NG/ML — SIGNIFICANT CHANGE UP (ref 1.1–4.4)
CALCIUM SERPL-MCNC: 8 MG/DL — LOW (ref 8.4–10.5)
CHLORIDE SERPL-SCNC: 97 MMOL/L — SIGNIFICANT CHANGE UP (ref 96–108)
CO2 SERPL-SCNC: 29 MMOL/L — SIGNIFICANT CHANGE UP (ref 22–31)
CREAT SERPL-MCNC: 0.55 MG/DL — SIGNIFICANT CHANGE UP (ref 0.5–1.3)
GLUCOSE BLDC GLUCOMTR-MCNC: 102 MG/DL — HIGH (ref 70–99)
GLUCOSE BLDC GLUCOMTR-MCNC: 69 MG/DL — LOW (ref 70–99)
GLUCOSE BLDC GLUCOMTR-MCNC: 73 MG/DL — SIGNIFICANT CHANGE UP (ref 70–99)
GLUCOSE BLDC GLUCOMTR-MCNC: 85 MG/DL — SIGNIFICANT CHANGE UP (ref 70–99)
GLUCOSE BLDC GLUCOMTR-MCNC: 97 MG/DL — SIGNIFICANT CHANGE UP (ref 70–99)
GLUCOSE SERPL-MCNC: 98 MG/DL — SIGNIFICANT CHANGE UP (ref 70–99)
HCT VFR BLD CALC: 23.9 % — LOW (ref 34.5–45)
HGB BLD-MCNC: 7.5 G/DL — LOW (ref 11.5–15.5)
INSULIN SERPL-MCNC: 6.9 UU/ML — SIGNIFICANT CHANGE UP (ref 2.6–24.9)
MCHC RBC-ENTMCNC: 29.1 PG — SIGNIFICANT CHANGE UP (ref 27–34)
MCHC RBC-ENTMCNC: 31.4 GM/DL — LOW (ref 32–36)
MCV RBC AUTO: 92.6 FL — SIGNIFICANT CHANGE UP (ref 80–100)
NRBC # BLD: 0 /100 WBCS — SIGNIFICANT CHANGE UP (ref 0–0)
PLATELET # BLD AUTO: 318 K/UL — SIGNIFICANT CHANGE UP (ref 150–400)
POTASSIUM SERPL-MCNC: 4 MMOL/L — SIGNIFICANT CHANGE UP (ref 3.5–5.3)
POTASSIUM SERPL-SCNC: 4 MMOL/L — SIGNIFICANT CHANGE UP (ref 3.5–5.3)
RBC # BLD: 2.58 M/UL — LOW (ref 3.8–5.2)
RBC # FLD: 15.1 % — HIGH (ref 10.3–14.5)
SODIUM SERPL-SCNC: 133 MMOL/L — LOW (ref 135–145)
SURGICAL PATHOLOGY STUDY: SIGNIFICANT CHANGE UP
WBC # BLD: 14.21 K/UL — HIGH (ref 3.8–10.5)
WBC # FLD AUTO: 14.21 K/UL — HIGH (ref 3.8–10.5)

## 2019-07-29 PROCEDURE — 99233 SBSQ HOSP IP/OBS HIGH 50: CPT | Mod: GC

## 2019-07-29 PROCEDURE — 99232 SBSQ HOSP IP/OBS MODERATE 35: CPT | Mod: GC

## 2019-07-29 RX ORDER — FAMOTIDINE 10 MG/ML
20 INJECTION INTRAVENOUS
Refills: 0 | Status: DISCONTINUED | OUTPATIENT
Start: 2019-07-29 | End: 2019-08-02

## 2019-07-29 RX ORDER — INSULIN LISPRO 100/ML
VIAL (ML) SUBCUTANEOUS
Refills: 0 | Status: DISCONTINUED | OUTPATIENT
Start: 2019-07-29 | End: 2019-08-02

## 2019-07-29 RX ORDER — DIAZEPAM 5 MG
5 TABLET ORAL EVERY 8 HOURS
Refills: 0 | Status: DISCONTINUED | OUTPATIENT
Start: 2019-07-29 | End: 2019-08-02

## 2019-07-29 RX ADMIN — Medication 5 MILLIGRAM(S): at 05:27

## 2019-07-29 RX ADMIN — MORPHINE SULFATE 30 MILLIGRAM(S): 50 CAPSULE, EXTENDED RELEASE ORAL at 04:38

## 2019-07-29 RX ADMIN — Medication 1 SPRAY(S): at 18:04

## 2019-07-29 RX ADMIN — Medication 50 MILLIGRAM(S): at 21:11

## 2019-07-29 RX ADMIN — MORPHINE SULFATE 15 MILLIGRAM(S): 50 CAPSULE, EXTENDED RELEASE ORAL at 18:04

## 2019-07-29 RX ADMIN — SERTRALINE 125 MILLIGRAM(S): 25 TABLET, FILM COATED ORAL at 11:16

## 2019-07-29 RX ADMIN — Medication 1 MILLIGRAM(S): at 18:03

## 2019-07-29 RX ADMIN — Medication 650 MILLIGRAM(S): at 07:56

## 2019-07-29 RX ADMIN — Medication 1 MILLIGRAM(S): at 08:56

## 2019-07-29 RX ADMIN — Medication 650 MILLIGRAM(S): at 07:54

## 2019-07-29 RX ADMIN — Medication 5 MILLIGRAM(S): at 21:11

## 2019-07-29 RX ADMIN — MORPHINE SULFATE 15 MILLIGRAM(S): 50 CAPSULE, EXTENDED RELEASE ORAL at 19:04

## 2019-07-29 RX ADMIN — FAMOTIDINE 20 MILLIGRAM(S): 10 INJECTION INTRAVENOUS at 21:57

## 2019-07-29 RX ADMIN — MORPHINE SULFATE 30 MILLIGRAM(S): 50 CAPSULE, EXTENDED RELEASE ORAL at 05:14

## 2019-07-29 RX ADMIN — ENOXAPARIN SODIUM 40 MILLIGRAM(S): 100 INJECTION SUBCUTANEOUS at 21:11

## 2019-07-29 RX ADMIN — Medication 1 DROP(S): at 11:23

## 2019-07-29 RX ADMIN — Medication 1 TABLET(S): at 11:23

## 2019-07-29 RX ADMIN — Medication 100 MILLIGRAM(S): at 05:27

## 2019-07-29 NOTE — CONSULT NOTE ADULT - PROBLEM SELECTOR RECOMMENDATION 9
Hb is stable for the last 24 hours and the patient is asymptomatic.  Follow and hold on transfusion.  No evidence of acute blood loss now

## 2019-07-29 NOTE — PROGRESS NOTE ADULT - SUBJECTIVE AND OBJECTIVE BOX
Neurology Follow up note    Name  CARRIE العراقي    HPI:  Taken from Dr. Retana's office note 7/17/2019  "Patient presents to be evaluated for surgical intervention for progressively worsening gait disturbance.  She reports a h/o cervical spine surgery, multiple lumbar spine procedures with the most recent x 3 years ago for scoliosis repair. Since her lower back surgery she has persistent lower back pain and most recent mid upper back pain with onset x 1 year ago.    She has undergone multiple conservative measures ranging from the use of oral pain medications (currently utilizes Percocet and Morphine tabs), NADIR's and as last resort a spinal cord stimulator trial which have all failed at provided lasting symptom relief.    She is off balance and best describes her gait a "walking on a train"" (22 Jul 2019 16:38)      Interval History - back pain - no new radicular symptoms        REVIEW OF SYSTEMS    Vital Signs Last 24 Hrs  T(C): 37.1 (29 Jul 2019 08:32), Max: 37.3 (28 Jul 2019 21:10)  T(F): 98.7 (29 Jul 2019 08:32), Max: 99.2 (28 Jul 2019 21:10)  HR: 98 (29 Jul 2019 08:32) (78 - 100)  BP: 120/55 (29 Jul 2019 08:32) (120/55 - 171/65)  BP(mean): --  RR: 17 (29 Jul 2019 08:32) (16 - 17)  SpO2: 96% (29 Jul 2019 08:32) (95% - 99%)    Physical Exam-     Mental Status- awake and alert     Cranial Nerves- full EOM    Gait and station- no foot drop    Motor- moves all 4 extremities    Reflexes- decreased    Sensation- no sensory level    Coordination- no tremors    Vascular - no bruits    Medications  acetaminophen   Tablet .. 650 milliGRAM(s) Oral every 6 hours PRN  artificial  tears Solution 1 Drop(s) Both EYES daily  bisacodyl 5 milliGRAM(s) Oral every 12 hours  bisacodyl Suppository 10 milliGRAM(s) Rectal daily  calcium carbonate 1250 mG  + Vitamin D (OsCal 500 + D) 1 Tablet(s) Oral daily  clonazePAM  Tablet 1 milliGRAM(s) Oral every 8 hours PRN  dextrose 40% Gel 15 Gram(s) Oral once PRN  dextrose 5%. 1000 milliLiter(s) IV Continuous <Continuous>  dextrose 50% Injectable 12.5 Gram(s) IV Push once  dextrose 50% Injectable 12.5 Gram(s) IV Push once  dextrose 50% Injectable 25 Gram(s) IV Push once  dextrose 50% Injectable 25 Gram(s) IV Push once  diazepam    Tablet 5 milliGRAM(s) Oral every 8 hours  docusate sodium 100 milliGRAM(s) Oral three times a day  enoxaparin Injectable 40 milliGRAM(s) SubCutaneous at bedtime  fluticasone propionate 50 MICROgram(s)/spray Nasal Spray 1 Spray(s) Both Nostrils two times a day  glucagon  Injectable 1 milliGRAM(s) IntraMuscular once PRN  magnesium hydroxide Suspension 30 milliLiter(s) Oral every 12 hours PRN  morphine  - Injectable 4 milliGRAM(s) IV Push every 4 hours PRN  morphine  IR 15 milliGRAM(s) Oral every 4 hours PRN  morphine  IR 30 milliGRAM(s) Oral every 4 hours PRN  naloxone Injectable 0.1 milliGRAM(s) IV Push every 3 minutes PRN  ondansetron Injectable 4 milliGRAM(s) IV Push every 6 hours PRN  polyethylene glycol 3350 17 Gram(s) Oral daily PRN  senna 2 Tablet(s) Oral at bedtime  sertraline 125 milliGRAM(s) Oral daily  traZODone 50 milliGRAM(s) Oral at bedtime      Lab      Radiology    Assessment- Lumbar radiculopathy    Plan as per ortho /NS

## 2019-07-29 NOTE — PROGRESS NOTE ADULT - SUBJECTIVE AND OBJECTIVE BOX
INTERVAL HPI/OVERNIGHT EVENTS:    Patient is a 74y old  Female who presents with a chief complaint of Back surgery (29 Jul 2019 16:29)      Pt reports the following symptoms:    CONSTITUTIONAL:  Negative fever or chills, feels well, good appetite  EYES:  Negative  blurry vision or double vision  CARDIOVASCULAR:  Negative for chest pain or palpitations  RESPIRATORY:  Negative for cough, wheezing, or SOB   GASTROINTESTINAL:  Negative for nausea, vomiting, diarrhea, constipation, or abdominal pain  GENITOURINARY:  Negative frequency, urgency or dysuria  NEUROLOGIC:  No headache, confusion, dizziness, lightheadedness    MEDICATIONS  (STANDING):  artificial  tears Solution 1 Drop(s) Both EYES daily  bisacodyl 5 milliGRAM(s) Oral every 12 hours  bisacodyl Suppository 10 milliGRAM(s) Rectal daily  calcium carbonate 1250 mG  + Vitamin D (OsCal 500 + D) 1 Tablet(s) Oral daily  dextrose 5%. 1000 milliLiter(s) (50 mL/Hr) IV Continuous <Continuous>  dextrose 50% Injectable 12.5 Gram(s) IV Push once  dextrose 50% Injectable 12.5 Gram(s) IV Push once  dextrose 50% Injectable 25 Gram(s) IV Push once  dextrose 50% Injectable 25 Gram(s) IV Push once  diazepam    Tablet 5 milliGRAM(s) Oral every 8 hours  docusate sodium 100 milliGRAM(s) Oral three times a day  enoxaparin Injectable 40 milliGRAM(s) SubCutaneous at bedtime  fluticasone propionate 50 MICROgram(s)/spray Nasal Spray 1 Spray(s) Both Nostrils two times a day  insulin lispro (HumaLOG) corrective regimen sliding scale   SubCutaneous Before meals and at bedtime  senna 2 Tablet(s) Oral at bedtime  sertraline 125 milliGRAM(s) Oral daily  traZODone 50 milliGRAM(s) Oral at bedtime    MEDICATIONS  (PRN):  acetaminophen   Tablet .. 650 milliGRAM(s) Oral every 6 hours PRN Temp greater or equal to 38C (100.4F), Moderate Pain (4 - 6)  clonazePAM  Tablet 1 milliGRAM(s) Oral every 8 hours PRN anxiety  dextrose 40% Gel 15 Gram(s) Oral once PRN Blood Glucose LESS THAN 70 milliGRAM(s)/deciliter  glucagon  Injectable 1 milliGRAM(s) IntraMuscular once PRN Glucose LESS THAN 70 milligrams/deciliter  magnesium hydroxide Suspension 30 milliLiter(s) Oral every 12 hours PRN Constipation  morphine  - Injectable 4 milliGRAM(s) IV Push every 4 hours PRN breakthrough pain  morphine  IR 15 milliGRAM(s) Oral every 4 hours PRN Moderate Pain (4 - 6)  morphine  IR 30 milliGRAM(s) Oral every 4 hours PRN Severe Pain (7 - 10)  naloxone Injectable 0.1 milliGRAM(s) IV Push every 3 minutes PRN For ANY of the following changes in patient status:  A. RR LESS THAN 10 breaths per minute, B. Oxygen saturation LESS THAN 90%, C. Sedation score of 6  ondansetron Injectable 4 milliGRAM(s) IV Push every 6 hours PRN Nausea  polyethylene glycol 3350 17 Gram(s) Oral daily PRN Constipation      PHYSICAL EXAM  Vital Signs Last 24 Hrs  T(C): 37.6 (29 Jul 2019 16:22), Max: 37.6 (29 Jul 2019 16:22)  T(F): 99.6 (29 Jul 2019 16:22), Max: 99.6 (29 Jul 2019 16:22)  HR: 78 (29 Jul 2019 16:22) (78 - 100)  BP: 103/64 (29 Jul 2019 16:22) (103/64 - 136/66)  BP(mean): --  RR: 18 (29 Jul 2019 16:22) (16 - 18)  SpO2: 97% (29 Jul 2019 16:22) (96% - 99%)    Constitutional: wn/wd in NAD.   HEENT: NCAT, MMM, OP clear, EOMI, no proptosis or lid retraction  Neck: no thyromegaly or palpable thyroid nodules   Respiratory: lungs CTAB.  Cardiovascular: regular rhythm, normal S1 and S2, no audible murmurs, no peripheral edema  GI: soft, NT/ND, no masses/HSM appreciated.  Neurology: no tremors, DTR 2+  Skin: no visible rashes/lesions  Psychiatric: AAO x 3, normal affect/mood.    LABS:                        7.5    14.21 )-----------( 318      ( 29 Jul 2019 08:29 )             23.9     07-29    133<L>  |  97  |  10  ----------------------------<  98  4.0   |  29  |  0.55    Ca    8.0<L>      29 Jul 2019 08:29  Phos  3.4     07-28  Mg     2.0     07-28              HbA1C: 5.3 % (07-23 @ 07:01)    CAPILLARY BLOOD GLUCOSE      POCT Blood Glucose.: 73 mg/dL (29 Jul 2019 17:16)  POCT Blood Glucose.: 85 mg/dL (29 Jul 2019 15:18)  POCT Blood Glucose.: 69 mg/dL (29 Jul 2019 11:40)  POCT Blood Glucose.: 97 mg/dL (29 Jul 2019 04:36)  POCT Blood Glucose.: 100 mg/dL (28 Jul 2019 21:49)  POCT Blood Glucose.: 46 mg/dL (28 Jul 2019 18:26)  POCT Blood Glucose.: 50 mg/dL (28 Jul 2019 18:22)      Insulin Sliding Scale requirements X 24 Hours:    RADIOLOGY & ADDITIONAL TESTS:    A/P: 74y Female with history of DM type II presenting for       1.  DM -     Please continue           units lantus at bedtime  / in the morning and        units lispro with meals and lispro moderate / low dose sliding scale 4 times daily with meals and at bedtime.  Please continue consistent carbohydrate diet.      Goal FSG is   Will continue to monitor   For discharge, pt can continue    Pt can follow up at discharge with [facility] by calling  to make an appointment.   Will discuss case with     and update primary team INTERVAL HPI/OVERNIGHT EVENTS:    Patient seen and examined at the bedside. She did not have any hypoglycemic episodes today morning. She said that she felt a little hungry today morning when her blood glucose was 97. She denies having any symptoms of hypoglycemia when she is in the hospital. But she does say that she may have some shaking when she feels she is hungry and needs to eat something.  No nausea, vomiting dizziness.  She says that her appetite is increased since the procedure and has been on decadron and has been consuming a lot of simple sugars.    FSG & Insulin received:    Yesterday:  pre-dinner fs, had orange, sandwich, whole milk, coffee with sugar  bedtime fs      Today:  4:30 AM FSG 97, had spice cake  pre-breakfast fs, had greenberg, 1 pancake with maple sugar, coffee, orange juice  pre-lunch fs, 1/2 turkey, beans, potato    Pt reports the following symptoms:    CONSTITUTIONAL:  Negative fever or chills, feels well, good appetite  EYES:  Negative  blurry vision or double vision  CARDIOVASCULAR:  Negative for chest pain or palpitations  RESPIRATORY:  Negative for cough, wheezing, or SOB   GASTROINTESTINAL:  Negative for nausea, vomiting, diarrhea, constipation  GENITOURINARY:  Negative frequency, urgency or dysuria  NEUROLOGIC:  No headache, confusion, dizziness, lightheadedness    MEDICATIONS  (STANDING):  artificial  tears Solution 1 Drop(s) Both EYES daily  bisacodyl 5 milliGRAM(s) Oral every 12 hours  bisacodyl Suppository 10 milliGRAM(s) Rectal daily  calcium carbonate 1250 mG  + Vitamin D (OsCal 500 + D) 1 Tablet(s) Oral daily  dextrose 5%. 1000 milliLiter(s) (50 mL/Hr) IV Continuous <Continuous>  dextrose 50% Injectable 12.5 Gram(s) IV Push once  dextrose 50% Injectable 12.5 Gram(s) IV Push once  dextrose 50% Injectable 25 Gram(s) IV Push once  dextrose 50% Injectable 25 Gram(s) IV Push once  diazepam    Tablet 5 milliGRAM(s) Oral every 8 hours  docusate sodium 100 milliGRAM(s) Oral three times a day  enoxaparin Injectable 40 milliGRAM(s) SubCutaneous at bedtime  fluticasone propionate 50 MICROgram(s)/spray Nasal Spray 1 Spray(s) Both Nostrils two times a day  insulin lispro (HumaLOG) corrective regimen sliding scale   SubCutaneous Before meals and at bedtime  senna 2 Tablet(s) Oral at bedtime  sertraline 125 milliGRAM(s) Oral daily  traZODone 50 milliGRAM(s) Oral at bedtime    MEDICATIONS  (PRN):  acetaminophen   Tablet .. 650 milliGRAM(s) Oral every 6 hours PRN Temp greater or equal to 38C (100.4F), Moderate Pain (4 - 6)  clonazePAM  Tablet 1 milliGRAM(s) Oral every 8 hours PRN anxiety  dextrose 40% Gel 15 Gram(s) Oral once PRN Blood Glucose LESS THAN 70 milliGRAM(s)/deciliter  glucagon  Injectable 1 milliGRAM(s) IntraMuscular once PRN Glucose LESS THAN 70 milligrams/deciliter  magnesium hydroxide Suspension 30 milliLiter(s) Oral every 12 hours PRN Constipation  morphine  - Injectable 4 milliGRAM(s) IV Push every 4 hours PRN breakthrough pain  morphine  IR 15 milliGRAM(s) Oral every 4 hours PRN Moderate Pain (4 - 6)  morphine  IR 30 milliGRAM(s) Oral every 4 hours PRN Severe Pain (7 - 10)  naloxone Injectable 0.1 milliGRAM(s) IV Push every 3 minutes PRN For ANY of the following changes in patient status:  A. RR LESS THAN 10 breaths per minute, B. Oxygen saturation LESS THAN 90%, C. Sedation score of 6  ondansetron Injectable 4 milliGRAM(s) IV Push every 6 hours PRN Nausea  polyethylene glycol 3350 17 Gram(s) Oral daily PRN Constipation      PHYSICAL EXAM  Vital Signs Last 24 Hrs  T(C): 37.6 (2019 16:22), Max: 37.6 (2019 16:22)  T(F): 99.6 (2019 16:22), Max: 99.6 (2019 16:22)  HR: 78 (2019 16:22) (78 - 100)  BP: 103/64 (2019 16:22) (103/64 - 136/66)  BP(mean): --  RR: 18 (2019 16:22) (16 - 18)  SpO2: 97% (2019 16:22) (96% - 99%)    Constitutional: wn/wd in NAD.   Respiratory: lungs CTAB.  Cardiovascular: regular rhythm, normal S1 and S2, no audible murmurs, no peripheral edema  GI: soft, NT/ND, no masses/HSM appreciated.  Neurology: no tremors, DTR 2+, no focal neurological deficits  Skin: extremities are a little cold  Psychiatric: AAO x 3, normal affect/mood.    LABS:                        7.5    14.21 )-----------( 318      ( 2019 08:29 )             23.9         133<L>  |  97  |  10  ----------------------------<  98  4.0   |  29  |  0.55    Ca    8.0<L>      2019 08:29  Phos  3.4       Mg     2.0                   HbA1C: 5.3 % ( @ 07:01)    CAPILLARY BLOOD GLUCOSE      POCT Blood Glucose.: 73 mg/dL (2019 17:16)  POCT Blood Glucose.: 85 mg/dL (2019 15:18)  POCT Blood Glucose.: 69 mg/dL (2019 11:40)  POCT Blood Glucose.: 97 mg/dL (2019 04:36)  POCT Blood Glucose.: 100 mg/dL (2019 21:49)  POCT Blood Glucose.: 46 mg/dL (2019 18:26)  POCT Blood Glucose.: 50 mg/dL (2019 18:22)      Insulin Sliding Scale requirements X 24 Hours:    RADIOLOGY & ADDITIONAL TESTS:    A/P: Ms. Baeza is a 74 year-old woman with a history of depression, osteoporosis now postoperative day 6 from T10-11 laminectomy, T9-12 fusion. We are consulted for hypoglycemia, likely artefactual as patient without autonomic or neuroglycopenic symptoms of hypoglycemia.   1. Hypoglycemia - likely postprandial reactive hypoglycemia  - insulin level was 6.9 when blood glucose was 98.  - If fingerstick glucose again less than 60 mg/dL, can send the following evaluation before administration of glucagon: Glucose, Insulin, C-peptide, Proinsulin levels  - Please obtain 200 AM serum blood glucose  - Please change her diet to carb consistent diet with no simple sugars in it.    Pt can follow up at discharge with [facility] by calling  to make an appointment.   discussed case with     and updated primary team

## 2019-07-29 NOTE — PROGRESS NOTE ADULT - SUBJECTIVE AND OBJECTIVE BOX
Pt seen and examined. No complaints.     Exam:  Surgical incision c/d/i. No infection, separation or tension.   KEVIN drain 60mL/24 hours - serosanguinous

## 2019-07-29 NOTE — CONSULT NOTE ADULT - ENMT
After Visit Summary   11/13/2018    Triny Castellanos    MRN: [id number]           Patient Information     Date Of Birth          2013        Visit Information        Provider Department      11/13/2018 11:00 AM Cesilia Vega MD; LANGUAGE [facility]        Today's Diagnoses     Abdominal pain, epigastric    -  1       Follow-ups after your visit        Future tests that were ordered for you today     Open Future Orders        Priority Expected Expires Ordered    XR KUB Routine 11/13/2018 11/13/2019 11/13/2018            Who to contact     If you have questions or need follow up information about today's clinic visit or your schedule please contact [facility] directly at [phone].  Normal or non-critical lab and imaging results will be communicated to you by MyChart, letter or phone within 4 business days after the clinic has received the results. If you do not hear from us within 7 days, please contact the clinic through [employer] or phone. If you have a critical or abnormal lab result, we will notify you by phone as soon as possible.  Submit refill requests through [employer] or call your pharmacy and they will forward the refill request to us. Please allow 3 business days for your refill to be completed.          Additional Information About Your Visit        MyChart Information     [employer] lets you send messages to your doctor, view your test results, renew your prescriptions, schedule appointments and more. To sign up, go to [email or web address], contact your [place] clinic or call [phone] during business hours.            Care EveryWhere ID     This is your Care EveryWhere ID. This could be used by other organizations to access your [facility] medical records  [id number]        Your Vitals Were     Pulse Temperature Pulse Oximetry             102 98  F (36.7  C) (Oral) 99%          Blood Pressure from Last 3 Encounters:    11/13/18 110/66   03/21/18 106/70   08/25/17 102/54    Weight from Last 3 Encounters:   11/13/18 42 lb 14.4 oz (19.5 kg) (95 %)*   09/02/18 44 lb 4 oz (20.1 kg) (98 %)*   07/02/18 43 lb 6.4 oz (19.7 kg) (98 %)*     * Growth percentiles are based on CDC 2-20 Years data.              We Performed the Following     CBC with platelets differential     Comprehensive metabolic panel (BMP + Alb, Alk Phos, ALT, AST, Total. Bili, TP)     UA with Microscopic        Primary Care Provider Office Phone # Fax #    Cesilia Vega [phone]       [address] [facility] [address]        Equal Access to Services     MINI GOYAL : Hadii aad ku hadasho Sohumble, waaxda luqadaha, qaybta kaalmada jodeeyajayshree, patrica hernandez . So [facility] [phone].    ATENCIÓN: Si habla español, tiene a benson disposición servicios gratuitos de asistencia lingüística. [facility] [phone].    We comply with applicable federal civil rights laws and [place] laws. We do not discriminate on the basis of race, color, national origin, age, disability, sex, sexual orientation, or gender identity.            Thank you!     Thank you for choosing [facility]  for your care. Our goal is always to provide you with excellent care. Hearing back from our patients is one way we can continue to improve our services. Please take a few minutes to complete the written survey that you may receive in the mail after your visit with us. Thank you!             Your Updated Medication List - Protect others around you: Learn how to safely use, store and throw away your medicines at www.disposemymeds.org.          This list is accurate as of 11/13/18 11:55 AM.  Always use your most recent med list.                   Brand Name Dispense Instructions for use Diagnosis    acetaminophen 32 mg/mL solution    TYLENOL    200 mL    Take 10.15 mLs (325 mg) by mouth every 6 hours as needed for fever or pain    Upper  respiratory tract infection, unspecified type       ibuprofen 40 MG/ML suspension    MOTRIN CHILD DROPS    200 mL    Take 5 mLs (200 mg) by mouth every 6 hours as needed for moderate pain or fever        ondansetron 4 MG/5ML solution    ZOFRAN    90 mL    Take 2.5 mLs (2 mg) by mouth 2 times daily as needed for nausea or vomiting    Gastroenteritis          negative No oral lesions; no gross abnormalities

## 2019-07-29 NOTE — CONSULT NOTE ADULT - SUBJECTIVE AND OBJECTIVE BOX
Patient is a 74y old  Female who presents with a chief complaint of s/p laminectomy and spinal fusion (29 Jul 2019 18:21)      HPI:  Taken from Dr. Retana's office note 7/17/2019  "Patient presents to be evaluated for surgical intervention for progressively worsening gait disturbance.  She reports a h/o cervical spine surgery, multiple lumbar spine procedures with the most recent x 3 years ago for scoliosis repair. Since her lower back surgery she has persistent lower back pain and most recent mid upper back pain with onset x 1 year ago.    She has undergone multiple conservative measures ranging from the use of oral pain medications (currently utilizes Percocet and Morphine tabs), NADIR's and as last resort a spinal cord stimulator trial which have all failed at provided lasting symptom relief.    She is off balance and best describes her gait a "walking on a train"" (22 Jul 2019 16:38)      PAST MEDICAL & SURGICAL HISTORY:  Anxiety  Depression  Osteoporosis  History of neck surgery  History of back surgery: x4  History of lumbar fusion      FAMILY HISTORY:      SOCIAL HISTORY:  Smoking Status: [ ] Current, [ ] Former, [ ] Never  Pack Years:    MEDICATIONS:  Pulmonary:    Antimicrobials:    Anticoagulants:  enoxaparin Injectable 40 milliGRAM(s) SubCutaneous at bedtime    Onc:    GI/:  bisacodyl 5 milliGRAM(s) Oral every 12 hours  bisacodyl Suppository 10 milliGRAM(s) Rectal daily  docusate sodium 100 milliGRAM(s) Oral three times a day  famotidine    Tablet 20 milliGRAM(s) Oral two times a day  magnesium hydroxide Suspension 30 milliLiter(s) Oral every 12 hours PRN  polyethylene glycol 3350 17 Gram(s) Oral daily PRN  senna 2 Tablet(s) Oral at bedtime    Endocrine:  dextrose 40% Gel 15 Gram(s) Oral once PRN  dextrose 50% Injectable 12.5 Gram(s) IV Push once  dextrose 50% Injectable 12.5 Gram(s) IV Push once  dextrose 50% Injectable 25 Gram(s) IV Push once  dextrose 50% Injectable 25 Gram(s) IV Push once  glucagon  Injectable 1 milliGRAM(s) IntraMuscular once PRN  insulin lispro (HumaLOG) corrective regimen sliding scale   SubCutaneous Before meals and at bedtime    Cardiac:    Other Medications:  acetaminophen   Tablet .. 650 milliGRAM(s) Oral every 6 hours PRN  artificial  tears Solution 1 Drop(s) Both EYES daily  calcium carbonate 1250 mG  + Vitamin D (OsCal 500 + D) 1 Tablet(s) Oral daily  clonazePAM  Tablet 1 milliGRAM(s) Oral every 8 hours PRN  dextrose 5%. 1000 milliLiter(s) IV Continuous <Continuous>  diazepam    Tablet 5 milliGRAM(s) Oral every 8 hours  fluticasone propionate 50 MICROgram(s)/spray Nasal Spray 1 Spray(s) Both Nostrils two times a day  morphine  - Injectable 4 milliGRAM(s) IV Push every 4 hours PRN  morphine  IR 15 milliGRAM(s) Oral every 4 hours PRN  morphine  IR 30 milliGRAM(s) Oral every 4 hours PRN  naloxone Injectable 0.1 milliGRAM(s) IV Push every 3 minutes PRN  ondansetron Injectable 4 milliGRAM(s) IV Push every 6 hours PRN  sertraline 125 milliGRAM(s) Oral daily  traZODone 50 milliGRAM(s) Oral at bedtime      Allergies    iodine (Unknown)    Intolerances        Vital Signs Last 24 Hrs  T(C): 37.1 (29 Jul 2019 20:30), Max: 37.6 (29 Jul 2019 16:22)  T(F): 98.8 (29 Jul 2019 20:30), Max: 99.6 (29 Jul 2019 16:22)  HR: 86 (29 Jul 2019 20:30) (78 - 98)  BP: 116/56 (29 Jul 2019 20:30) (103/64 - 136/66)  BP(mean): --  RR: 12 (29 Jul 2019 20:30) (12 - 18)  SpO2: 96% (29 Jul 2019 20:30) (96% - 99%)    07-28 @ 07:01 - 07-29 @ 07:00  --------------------------------------------------------  IN: 1450 mL / OUT: 61 mL / NET: 1389 mL    07-29 @ 07:01 - 07-29 @ 21:31  --------------------------------------------------------  IN: 0 mL / OUT: 40 mL / NET: -40 mL          LABS:      CBC Full  -  ( 29 Jul 2019 08:29 )  WBC Count : 14.21 K/uL  RBC Count : 2.58 M/uL  Hemoglobin : 7.5 g/dL  Hematocrit : 23.9 %  Platelet Count - Automated : 318 K/uL  Mean Cell Volume : 92.6 fl  Mean Cell Hemoglobin : 29.1 pg  Mean Cell Hemoglobin Concentration : 31.4 gm/dL  Auto Neutrophil # : x  Auto Lymphocyte # : x  Auto Monocyte # : x  Auto Eosinophil # : x  Auto Basophil # : x  Auto Neutrophil % : x  Auto Lymphocyte % : x  Auto Monocyte % : x  Auto Eosinophil % : x  Auto Basophil % : x    07-29    133<L>  |  97  |  10  ----------------------------<  98  4.0   |  29  |  0.55    Ca    8.0<L>      29 Jul 2019 08:29  Phos  3.4     07-28  Mg     2.0     07-28                          RADIOLOGY & ADDITIONAL STUDIES (The following images were personally reviewed):
HPI: Ms. Baeza is a 74 year-old woman with a history of depression, osteoporosis now postoperative day 6 from T10-11 laminectomy, T9-12 fusion. We are consulted for hypoglycemia.    She has been received dexamethasone after her spinal surgery, and was receiving dexamethasone 1 mg Q6H today with last dose at 11 AM. Fingerstick glucose measurements were taken due to steroid use. She was noted to have a premeal blood sugar of 17 mg/dL today with one machine, then 56 mg/dL with another. She received glucagon, with subsequent blood sugar 209 mg/dL. She had no autonomic or neuroglycopenic symptoms of hypoglycemia, and felt no different after receiving glucagon. She had been working with physical therapy earlier today. She ate a good breakfast and in addition had an ice cream cup. She also had a premeal blood sugar value of 47 mg/dL yesterday, then 103 mg/dL on hour later after meal. Her fingertips are bruised from previous blood sugar measurements.    She states she has a history of "hypoglycemia", described as feeling shaky if she has not eaten, with no regular symptoms. She is able to sleep overnight without food intake or symptoms.    PMH & Surgical Hx:M47.14  Handoff  MEWS Score  Anxiety  Depression  Osteoporosis  Suspected deep vein thrombosis (DVT)  Back pain  History of neck surgery  History of back surgery  History of lumbar fusion      FH: Mother with history of prediabetes    SH: No smoking, alcohol    Current Meds:  acetaminophen   Tablet .. 650 milliGRAM(s) Oral every 6 hours PRN  artificial  tears Solution 1 Drop(s) Both EYES daily  bisacodyl 5 milliGRAM(s) Oral every 12 hours  bisacodyl Suppository 10 milliGRAM(s) Rectal daily  calcium carbonate 1250 mG  + Vitamin D (OsCal 500 + D) 1 Tablet(s) Oral daily  clonazePAM  Tablet 1 milliGRAM(s) Oral every 8 hours PRN  dextrose 40% Gel 15 Gram(s) Oral once PRN  dextrose 5%. 1000 milliLiter(s) IV Continuous <Continuous>  dextrose 50% Injectable 12.5 Gram(s) IV Push once  dextrose 50% Injectable 25 Gram(s) IV Push once  dextrose 50% Injectable 25 Gram(s) IV Push once  diazepam    Tablet 5 milliGRAM(s) Oral every 8 hours  docusate sodium 100 milliGRAM(s) Oral three times a day  enoxaparin Injectable 40 milliGRAM(s) SubCutaneous at bedtime  fluticasone propionate 50 MICROgram(s)/spray Nasal Spray 1 Spray(s) Both Nostrils two times a day  glucagon  Injectable 1 milliGRAM(s) IntraMuscular once PRN  insulin lispro (HumaLOG) corrective regimen sliding scale   SubCutaneous Before meals and at bedtime  magnesium hydroxide Suspension 30 milliLiter(s) Oral every 12 hours PRN  morphine  - Injectable 4 milliGRAM(s) IV Push every 4 hours PRN  morphine  IR 15 milliGRAM(s) Oral every 4 hours PRN  morphine  IR 30 milliGRAM(s) Oral every 4 hours PRN  naloxone Injectable 0.1 milliGRAM(s) IV Push every 3 minutes PRN  ondansetron Injectable 4 milliGRAM(s) IV Push every 6 hours PRN  polyethylene glycol 3350 17 Gram(s) Oral daily PRN  senna 2 Tablet(s) Oral at bedtime  sertraline 125 milliGRAM(s) Oral daily  traZODone 50 milliGRAM(s) Oral at bedtime      Allergies:  iodine (Unknown)      ROS:  Denies the following except as indicated.    General: weight loss/weight gain, decreased appetite, fatigue  Eyes: Blurry vision, double vision, visual changes  ENT: Throat pain, changes in voice,   CV: palpitations, SOB, CP, cough  GI: NVD, difficulty swallowing, abdominal pain  : polyuria, dysuria  Endo: abnormal menses, temperature intolerance, decreased libido  MSK: weakness, joint pain  Skin: rash, dryness, diaphoresis  Heme: Easy bruising,bleeding  Neuro: HA, dizziness, lightheadedness, numbness tingling  Psych: Anxiety, Depression    Vital Signs Last 24 Hrs  T(C): 36.7 (28 Jul 2019 12:15), Max: 36.7 (28 Jul 2019 12:15)  T(F): 98.1 (28 Jul 2019 12:15), Max: 98.1 (28 Jul 2019 12:15)  HR: 94 (28 Jul 2019 12:30) (67 - 94)  BP: 131/71 (28 Jul 2019 12:30) (99/56 - 171/65)  BP(mean): --  RR: 16 (28 Jul 2019 12:15) (16 - 18)  SpO2: 95% (28 Jul 2019 12:15) (95% - 100%)  Height (cm): 165.1 (07-22 @ 06:49)  Weight (kg): 53.9 (07-22 @ 06:49)  BMI (kg/m2): 19.8 (07-22 @ 06:49)      Constitutional: wn/wd in NAD.   HEENT: NCAT, MMM, OP clear, EOMI, , no proptosis or lid retraction  Neck: no thyromegaly or palpable thyroid nodules   Respiratory: lungs CTAB.  Cardiovascular: regular rhythm, normal S1 and S2, no audible murmurs, no peripheral edema  GI: soft, NT/ND, no masses/HSM appreciated.  Neurology: no tremors, DTR 2+  Skin: no visible rashes/lesions  Psychiatric: AAO x 3, normal affect/mood.  Ext: radial pulses intact, DP pulses intact, extremities warm, no cyanosis, clubbing or edema.       LABS:                        7.6    11.97 )-----------( 281      ( 28 Jul 2019 06:34 )             24.1     07-28    135  |  100  |  14  ----------------------------<  94  4.1   |  27  |  0.59    Ca    8.2<L>      28 Jul 2019 06:34  Phos  3.4     07-28  Mg     2.0     07-28          Hemoglobin A1C, Whole Blood: 5.3 (07-23 @ 07:01)        RADIOLOGY & ADDITIONAL STUDIES:  CAPILLARY BLOOD GLUCOSE      POCT Blood Glucose.: 204 mg/dL (28 Jul 2019 12:32)  POCT Blood Glucose.: 209 mg/dL (28 Jul 2019 12:28)  POCT Blood Glucose.: 45 mg/dL (28 Jul 2019 12:03)  POCT Blood Glucose.: 56 mg/dL (28 Jul 2019 11:57)  POCT Blood Glucose.: 17 mg/dL (28 Jul 2019 11:54)  POCT Blood Glucose.: 116 mg/dL (28 Jul 2019 06:38)  POCT Blood Glucose.: 122 mg/dL (27 Jul 2019 22:36)  POCT Blood Glucose.: 106 mg/dL (27 Jul 2019 17:42)        Assessment/Recommendations: Ms. Baeza is a 74 year-old woman with a history of depression, osteoporosis now postoperative day 6 from T10-11 laminectomy, T9-12 fusion. We are consulted for hypoglycemia, likely artefactual as patient without autonomic or neuroglycopenic symptoms of hypoglycemia. Recommend fingerstick on the side of the finger and away from the pad and measurement of fingerstick glucose after alcohol has had time to dry. If fingerstick glucose again less than 55 mg/dL, can send the following evaluation before administration of glucagon: Glucose, Insulin, C-peptide, Beta-hydroxybutyrate (BHOB), Proinsulin, Sulfonylurea and meglitinide screen. If low blood sugars occur again, can consider evaluation of cortisol on Tuesday after recent dexamethasone therapy.   Thank you for this consult.  Ellen Royal MD
Patient is a 74y old  Female who presents with a chief complaint of  back pain and radicular symptoms in the LE      HPI: s/p surgery - with RLE weakness and radicular symptoms      Allergies  iodine (Unknown)      Health Issues  M47.14  Anxiety  Depression  Osteoporosis  History of neck surgery  History of back surgery  History of lumbar fusion        FAMILY HISTORY:      MEDICATIONS  (STANDING):  BUpivacaine liposome 1.3% Injectable (no eMAR) 20 milliLiter(s) Local Injection once    MEDICATIONS  (PRN):      PAST MEDICAL & SURGICAL HISTORY:  Anxiety  Depression  Osteoporosis  History of neck surgery  History of back surgery: x4  History of lumbar fusion      Labs              Radiology:    Physical Exam    MENTAL STATUS  -Level of Consciousness- awake    Orientation- person, place time  Language- aphasia/ dysarthria- nl  Memory- recent and remote- nl      Cranial Nerve 1- 12  Pupils- equal and reactive  Eye movements- full  Facial - no asymmetry   Lower CN-nl    Gait and Station- n/a    MOTOR  Upper-nl  Lower-RLE weakness    Reflexes- decrease AJ    Sensation- no sensory level    Cerebellar- no tremors    vascular - no bruits    Assessment- Lumbar radiculopathy    Plan  as per NS

## 2019-07-29 NOTE — PROGRESS NOTE ADULT - ASSESSMENT
A/P: Pt doing well. Optimizing blood glucose.   1. Maintain drain until less than 20mL/24 hours  2. Change aquacel every 3 days  3. Maintain sutures until 4 weeks post-op

## 2019-07-29 NOTE — PROGRESS NOTE ADULT - SUBJECTIVE AND OBJECTIVE BOX
HPI:  Taken from Dr. Retana's office note 7/17/2019  "Patient presents to be evaluated for surgical intervention for progressively worsening gait disturbance.  She reports a h/o cervical spine surgery, multiple lumbar spine procedures with the most recent x 3 years ago for scoliosis repair. Since her lower back surgery she has persistent lower back pain and most recent mid upper back pain with onset x 1 year ago.    She has undergone multiple conservative measures ranging from the use of oral pain medications (currently utilizes Percocet and Morphine tabs), NADIR's and as last resort a spinal cord stimulator trial which have all failed at provided lasting symptom relief.    She is off balance and best describes her gait a "walking on a train"" (22 Jul 2019 16:38)    7/22: POD#0: s/p T10-11 lami, T9-12 fusion.  No overnight events.  7/23: POD#1: PCA Dilaudid discontinued. Pain well controlled.  7/24: POD#2: No major events overnight. Pain is well controlled.  7/25: POD #3: PAL overnight. Aquacel dressing changed today. Lightheaded with PT, will reattempt tomorrow. Pt recommending home w/ home PT   7/26: POD #4: No issues overnight, pain well controlled. HMV removed today.  7/27: POD#5: No issues overnight. KEVIN drain possible removal today pending PRS recs. Pending BM. Started on flonase for congestion.   7/28: POD#6: No major events overnight. Sinus congestion improved with flonase. No hypoglycemia overnight. superficial KEVIN drain in place.    OVERNIGHT EVENTS:  Vital Signs Last 24 Hrs  T(C): 37.2 (29 Jul 2019 00:18), Max: 37.3 (28 Jul 2019 21:10)  T(F): 98.9 (29 Jul 2019 00:18), Max: 99.2 (28 Jul 2019 21:10)  HR: 78 (29 Jul 2019 00:18) (67 - 100)  BP: 136/66 (29 Jul 2019 00:18) (99/56 - 171/65)  BP(mean): --  RR: 16 (29 Jul 2019 00:18) (16 - 17)  SpO2: 99% (29 Jul 2019 00:18) (95% - 99%)    I&O's Summary    27 Jul 2019 07:01  -  28 Jul 2019 07:00  --------------------------------------------------------  IN: 200 mL / OUT: 1390 mL / NET: -1190 mL    28 Jul 2019 07:01  -  29 Jul 2019 04:33  --------------------------------------------------------  IN: 1450 mL / OUT: 51 mL / NET: 1399 mL        PHYSICAL EXAM:  Neurological: A&O x 3. CNII to XI grossly intact.  Ext:  strength 4/5 LE b/l, 5/5 UE b/l, no drift  SILT throughout b/l  Back: surgical wound is dry and clean. Aqua cell dressing in place.  superficial KEVIN drain place.        DIET: Regular            LABS:                        7.6    15.77 )-----------( 300      ( 28 Jul 2019 16:01 )             24.2     07-28    131<L>  |  97  |  16  ----------------------------<  82  4.6   |  27  |  0.53    Ca    8.2<L>      28 Jul 2019 18:28  Phos  3.4     07-28  Mg     2.0     07-28              CAPILLARY BLOOD GLUCOSE      POCT Blood Glucose.: 100 mg/dL (28 Jul 2019 21:49)  POCT Blood Glucose.: 46 mg/dL (28 Jul 2019 18:26)  POCT Blood Glucose.: 50 mg/dL (28 Jul 2019 18:22)  POCT Blood Glucose.: 55 mg/dL (28 Jul 2019 17:24)  POCT Blood Glucose.: 204 mg/dL (28 Jul 2019 12:32)  POCT Blood Glucose.: 209 mg/dL (28 Jul 2019 12:28)  POCT Blood Glucose.: 45 mg/dL (28 Jul 2019 12:03)  POCT Blood Glucose.: 56 mg/dL (28 Jul 2019 11:57)  POCT Blood Glucose.: 17 mg/dL (28 Jul 2019 11:54)  POCT Blood Glucose.: 116 mg/dL (28 Jul 2019 06:38)      Drug Levels: [] N/A    CSF Analysis: [] N/A      Allergies    iodine (Unknown)    Intolerances      MEDICATIONS:  Antibiotics:    Neuro:  acetaminophen   Tablet .. 650 milliGRAM(s) Oral every 6 hours PRN  clonazePAM  Tablet 1 milliGRAM(s) Oral every 8 hours PRN  diazepam    Tablet 5 milliGRAM(s) Oral every 8 hours  morphine  - Injectable 4 milliGRAM(s) IV Push every 4 hours PRN  morphine  IR 15 milliGRAM(s) Oral every 4 hours PRN  morphine  IR 30 milliGRAM(s) Oral every 4 hours PRN  ondansetron Injectable 4 milliGRAM(s) IV Push every 6 hours PRN  sertraline 125 milliGRAM(s) Oral daily  traZODone 50 milliGRAM(s) Oral at bedtime    Anticoagulation:  enoxaparin Injectable 40 milliGRAM(s) SubCutaneous at bedtime    OTHER:  artificial  tears Solution 1 Drop(s) Both EYES daily  bisacodyl 5 milliGRAM(s) Oral every 12 hours  bisacodyl Suppository 10 milliGRAM(s) Rectal daily  dextrose 40% Gel 15 Gram(s) Oral once PRN  dextrose 50% Injectable 12.5 Gram(s) IV Push once  dextrose 50% Injectable 12.5 Gram(s) IV Push once  dextrose 50% Injectable 25 Gram(s) IV Push once  dextrose 50% Injectable 25 Gram(s) IV Push once  docusate sodium 100 milliGRAM(s) Oral three times a day  fluticasone propionate 50 MICROgram(s)/spray Nasal Spray 1 Spray(s) Both Nostrils two times a day  glucagon  Injectable 1 milliGRAM(s) IntraMuscular once PRN  insulin lispro (HumaLOG) corrective regimen sliding scale   SubCutaneous Before meals and at bedtime  magnesium hydroxide Suspension 30 milliLiter(s) Oral every 12 hours PRN  naloxone Injectable 0.1 milliGRAM(s) IV Push every 3 minutes PRN  polyethylene glycol 3350 17 Gram(s) Oral daily PRN  senna 2 Tablet(s) Oral at bedtime    IVF:  calcium carbonate 1250 mG  + Vitamin D (OsCal 500 + D) 1 Tablet(s) Oral daily  dextrose 5%. 1000 milliLiter(s) IV Continuous <Continuous>    CULTURES:    RADIOLOGY & ADDITIONAL TESTS:      ASSESSMENT:  74y Female s/p day 7 T10-11 laminectomy, T9-12 posterior fusion.    M47.14  Handoff  MEWS Score  Anxiety  Depression  Osteoporosis  Suspected deep vein thrombosis (DVT)  Back pain  History of neck surgery  History of back surgery  History of lumbar fusion      PLAN:  -neuro/spinal checks  -Pain control: trazodone, morphine, valium   -Klonopin and sertraline for anxiety   -cont Ca and vit D   -dex taper   -monitor thoracic wound- plastics closure (Ronit Genao)  -JPx1 (superficial) monitor output. Keep KEVIN until output < 20ml in 24 hrs.  -SBP normotensive  -room air, satting well  - monitor hypoglycemia  - monitor hyponatremia.    DVT PROPHYLAXIS:  [x] Venodynes                                [x] Heparin/Lovenox    DISPOSITION: Home with home PT.         Assessment:  Present when checked    []  GCS  E   V  M     Heart Failure: []Acute, [] acute on chronic , []chronic  Heart Failure:  [] Diastolic (HFpEF), [] Systolic (HFrEF), []Combined (HFpEF and HFrEF), [] RHF, [] Pulm HTN, [] Other    [] LUIZA, [] ATN, [] AIN, [] other  [] CKD1, [] CKD2, [] CKD 3, [] CKD 4, [] CKD 5, []ESRD    Encephalopathy: [] Metabolic, [] Hepatic, [] toxic, [] Neurological, [] Other    Abnormal Nurtitional Status: [] malnurtition (see nutrition note), [ ]underweight: BMI < 19, [] morbid obesity: BMI >40, [] Cachexia    [] Sepsis  [] hypovolemic shock,[] cardiogenic shock, [] hemorrhagic shock, [] neuogenic shock  [] Acute Respiratory Failure  []Cerebral edema, [] Brain compression/ herniation,   [] Functional quadriplegia  [] Acute blood loss anemia

## 2019-07-30 LAB
ANION GAP SERPL CALC-SCNC: 6 MMOL/L — SIGNIFICANT CHANGE UP (ref 5–17)
BUN SERPL-MCNC: 10 MG/DL — SIGNIFICANT CHANGE UP (ref 7–23)
CALCIUM SERPL-MCNC: 8.4 MG/DL — SIGNIFICANT CHANGE UP (ref 8.4–10.5)
CHLORIDE SERPL-SCNC: 103 MMOL/L — SIGNIFICANT CHANGE UP (ref 96–108)
CO2 SERPL-SCNC: 29 MMOL/L — SIGNIFICANT CHANGE UP (ref 22–31)
CORTIS AM PEAK SERPL-MCNC: 13.9 UG/DL — SIGNIFICANT CHANGE UP (ref 3.9–37.5)
CREAT SERPL-MCNC: 0.6 MG/DL — SIGNIFICANT CHANGE UP (ref 0.5–1.3)
GLUCOSE BLDC GLUCOMTR-MCNC: 107 MG/DL — HIGH (ref 70–99)
GLUCOSE BLDC GLUCOMTR-MCNC: 73 MG/DL — SIGNIFICANT CHANGE UP (ref 70–99)
GLUCOSE BLDC GLUCOMTR-MCNC: 94 MG/DL — SIGNIFICANT CHANGE UP (ref 70–99)
GLUCOSE BLDC GLUCOMTR-MCNC: 97 MG/DL — SIGNIFICANT CHANGE UP (ref 70–99)
GLUCOSE BLDC GLUCOMTR-MCNC: 98 MG/DL — SIGNIFICANT CHANGE UP (ref 70–99)
GLUCOSE SERPL-MCNC: 91 MG/DL — SIGNIFICANT CHANGE UP (ref 70–99)
HCT VFR BLD CALC: 23.5 % — LOW (ref 34.5–45)
HGB BLD-MCNC: 7.4 G/DL — LOW (ref 11.5–15.5)
MAGNESIUM SERPL-MCNC: 1.9 MG/DL — SIGNIFICANT CHANGE UP (ref 1.6–2.6)
MCHC RBC-ENTMCNC: 29 PG — SIGNIFICANT CHANGE UP (ref 27–34)
MCHC RBC-ENTMCNC: 31.5 GM/DL — LOW (ref 32–36)
MCV RBC AUTO: 92.2 FL — SIGNIFICANT CHANGE UP (ref 80–100)
NRBC # BLD: 0 /100 WBCS — SIGNIFICANT CHANGE UP (ref 0–0)
PHOSPHATE SERPL-MCNC: 3.9 MG/DL — SIGNIFICANT CHANGE UP (ref 2.5–4.5)
PLATELET # BLD AUTO: 355 K/UL — SIGNIFICANT CHANGE UP (ref 150–400)
POTASSIUM SERPL-MCNC: 4.1 MMOL/L — SIGNIFICANT CHANGE UP (ref 3.5–5.3)
POTASSIUM SERPL-SCNC: 4.1 MMOL/L — SIGNIFICANT CHANGE UP (ref 3.5–5.3)
RBC # BLD: 2.55 M/UL — LOW (ref 3.8–5.2)
RBC # FLD: 15 % — HIGH (ref 10.3–14.5)
SODIUM SERPL-SCNC: 138 MMOL/L — SIGNIFICANT CHANGE UP (ref 135–145)
WBC # BLD: 11.84 K/UL — HIGH (ref 3.8–10.5)
WBC # FLD AUTO: 11.84 K/UL — HIGH (ref 3.8–10.5)

## 2019-07-30 PROCEDURE — 99232 SBSQ HOSP IP/OBS MODERATE 35: CPT | Mod: GC

## 2019-07-30 RX ORDER — MORPHINE SULFATE 50 MG/1
15 CAPSULE, EXTENDED RELEASE ORAL EVERY 4 HOURS
Refills: 0 | Status: DISCONTINUED | OUTPATIENT
Start: 2019-07-30 | End: 2019-07-31

## 2019-07-30 RX ORDER — MORPHINE SULFATE 50 MG/1
30 CAPSULE, EXTENDED RELEASE ORAL EVERY 4 HOURS
Refills: 0 | Status: DISCONTINUED | OUTPATIENT
Start: 2019-07-30 | End: 2019-08-02

## 2019-07-30 RX ORDER — IRON SUCROSE 20 MG/ML
200 INJECTION, SOLUTION INTRAVENOUS EVERY 24 HOURS
Refills: 0 | Status: DISCONTINUED | OUTPATIENT
Start: 2019-07-30 | End: 2019-08-02

## 2019-07-30 RX ORDER — MORPHINE SULFATE 50 MG/1
4 CAPSULE, EXTENDED RELEASE ORAL EVERY 4 HOURS
Refills: 0 | Status: DISCONTINUED | OUTPATIENT
Start: 2019-07-30 | End: 2019-08-02

## 2019-07-30 RX ORDER — POLYETHYLENE GLYCOL 3350 17 G/17G
17 POWDER, FOR SOLUTION ORAL DAILY
Refills: 0 | Status: DISCONTINUED | OUTPATIENT
Start: 2019-07-30 | End: 2019-08-02

## 2019-07-30 RX ADMIN — Medication 5 MILLIGRAM(S): at 05:16

## 2019-07-30 RX ADMIN — Medication 5 MILLIGRAM(S): at 22:28

## 2019-07-30 RX ADMIN — MORPHINE SULFATE 15 MILLIGRAM(S): 50 CAPSULE, EXTENDED RELEASE ORAL at 00:34

## 2019-07-30 RX ADMIN — MORPHINE SULFATE 30 MILLIGRAM(S): 50 CAPSULE, EXTENDED RELEASE ORAL at 11:10

## 2019-07-30 RX ADMIN — FAMOTIDINE 20 MILLIGRAM(S): 10 INJECTION INTRAVENOUS at 05:16

## 2019-07-30 RX ADMIN — SENNA PLUS 2 TABLET(S): 8.6 TABLET ORAL at 22:28

## 2019-07-30 RX ADMIN — MORPHINE SULFATE 4 MILLIGRAM(S): 50 CAPSULE, EXTENDED RELEASE ORAL at 03:42

## 2019-07-30 RX ADMIN — MORPHINE SULFATE 15 MILLIGRAM(S): 50 CAPSULE, EXTENDED RELEASE ORAL at 01:20

## 2019-07-30 RX ADMIN — IRON SUCROSE 110 MILLIGRAM(S): 20 INJECTION, SOLUTION INTRAVENOUS at 15:05

## 2019-07-30 RX ADMIN — MORPHINE SULFATE 4 MILLIGRAM(S): 50 CAPSULE, EXTENDED RELEASE ORAL at 03:27

## 2019-07-30 RX ADMIN — MORPHINE SULFATE 15 MILLIGRAM(S): 50 CAPSULE, EXTENDED RELEASE ORAL at 19:52

## 2019-07-30 RX ADMIN — Medication 100 MILLIGRAM(S): at 22:28

## 2019-07-30 RX ADMIN — FAMOTIDINE 20 MILLIGRAM(S): 10 INJECTION INTRAVENOUS at 18:43

## 2019-07-30 RX ADMIN — Medication 50 MILLIGRAM(S): at 22:28

## 2019-07-30 RX ADMIN — Medication 10 MILLIGRAM(S): at 15:06

## 2019-07-30 RX ADMIN — MORPHINE SULFATE 15 MILLIGRAM(S): 50 CAPSULE, EXTENDED RELEASE ORAL at 15:37

## 2019-07-30 RX ADMIN — Medication 100 MILLIGRAM(S): at 15:05

## 2019-07-30 RX ADMIN — Medication 1 TABLET(S): at 11:09

## 2019-07-30 RX ADMIN — Medication 5 MILLIGRAM(S): at 18:43

## 2019-07-30 RX ADMIN — MORPHINE SULFATE 4 MILLIGRAM(S): 50 CAPSULE, EXTENDED RELEASE ORAL at 22:41

## 2019-07-30 RX ADMIN — ENOXAPARIN SODIUM 40 MILLIGRAM(S): 100 INJECTION SUBCUTANEOUS at 22:27

## 2019-07-30 RX ADMIN — MORPHINE SULFATE 15 MILLIGRAM(S): 50 CAPSULE, EXTENDED RELEASE ORAL at 16:13

## 2019-07-30 RX ADMIN — Medication 1 MILLIGRAM(S): at 20:59

## 2019-07-30 RX ADMIN — Medication 5 MILLIGRAM(S): at 15:05

## 2019-07-30 RX ADMIN — MORPHINE SULFATE 4 MILLIGRAM(S): 50 CAPSULE, EXTENDED RELEASE ORAL at 23:00

## 2019-07-30 RX ADMIN — Medication 1 DROP(S): at 11:10

## 2019-07-30 RX ADMIN — SERTRALINE 125 MILLIGRAM(S): 25 TABLET, FILM COATED ORAL at 11:10

## 2019-07-30 RX ADMIN — Medication 1 SPRAY(S): at 05:16

## 2019-07-30 RX ADMIN — Medication 1 SPRAY(S): at 18:43

## 2019-07-30 NOTE — PROGRESS NOTE ADULT - SUBJECTIVE AND OBJECTIVE BOX
HPI:  Taken from Dr. Retana's office note 7/17/2019  "Patient presents to be evaluated for surgical intervention for progressively worsening gait disturbance.  She reports a h/o cervical spine surgery, multiple lumbar spine procedures with the most recent x 3 years ago for scoliosis repair. Since her lower back surgery she has persistent lower back pain and most recent mid upper back pain with onset x 1 year ago.    She has undergone multiple conservative measures ranging from the use of oral pain medications (currently utilizes Percocet and Morphine tabs), NADIR's and as last resort a spinal cord stimulator trial which have all failed at provided lasting symptom relief.    She is off balance and best describes her gait a "walking on a train"" (22 Jul 2019 16:38)    OVERNIGHT EVENTS:  No events overnight.     7/22: POD#0: s/p T10-11 lami, T9-12 fusion.  No overnight events.  7/23: POD#1: PCA Dilaudid discontinued. Pain well controlled.  7/24: POD#2: No major events overnight. Pain is well controlled.  7/25: POD #3: PAL overnight. Aquacel dressing changed today. Lightheaded with PT, will reattempt tomorrow. Pt recommending home w/ home PT   7/26: POD #4: No issues overnight, pain well controlled. HMV removed today.  7/27: POD#5: No issues overnight. KEVIN drain possible removal today pending PRS recs. Pending BM. Started on flonase for congestion.   7/28: POD#6: Hyponatremia likely chronic  from Zoloft use. Sinus congestion improved with flonase. several hypoglycemia events. superficial KEVIN drain in place.  7/29: POD#7: hypoglycemia continues post prandial . Changed to diabetic diet to avoid simple sugars. Drain still in place.  7/30: POD#8:     Vital Signs Last 24 Hrs  T(C): 37.1 (29 Jul 2019 20:30), Max: 37.6 (29 Jul 2019 16:22)  T(F): 98.8 (29 Jul 2019 20:30), Max: 99.6 (29 Jul 2019 16:22)  HR: 86 (29 Jul 2019 20:30) (78 - 98)  BP: 116/56 (29 Jul 2019 20:30) (103/64 - 136/66)  BP(mean): --  RR: 12 (29 Jul 2019 20:30) (12 - 18)  SpO2: 96% (29 Jul 2019 20:30) (96% - 99%)    I&O's Summary    28 Jul 2019 07:01  -  29 Jul 2019 07:00  --------------------------------------------------------  IN: 1450 mL / OUT: 61 mL / NET: 1389 mL    29 Jul 2019 07:01  -  30 Jul 2019 00:10  --------------------------------------------------------  IN: 60 mL / OUT: 40 mL / NET: 20 mL        PHYSICAL EXAM:  Neurological:  AxOX3  5/5 motor x4ext  Incision/Wound:C/D/I    TUBES/LINES:  [] Ybarra  [] Lumbar Drain  [x] Wound Drains-superficial KEVIN  [] Others      DIET:  [] NPO  [x] Mechanical-Diabetic  [] Tube feeds    LABS:                        7.5    14.21 )-----------( 318      ( 29 Jul 2019 08:29 )             23.9     07-29    133<L>  |  97  |  10  ----------------------------<  98  4.0   |  29  |  0.55    Ca    8.0<L>      29 Jul 2019 08:29  Phos  3.4     07-28  Mg     2.0     07-28              CAPILLARY BLOOD GLUCOSE      POCT Blood Glucose.: 102 mg/dL (29 Jul 2019 22:05)  POCT Blood Glucose.: 73 mg/dL (29 Jul 2019 17:16)  POCT Blood Glucose.: 85 mg/dL (29 Jul 2019 15:18)  POCT Blood Glucose.: 69 mg/dL (29 Jul 2019 11:40)  POCT Blood Glucose.: 97 mg/dL (29 Jul 2019 04:36)      Drug Levels: [] N/A    CSF Analysis: [] N/A      Allergies    iodine (Unknown)    Intolerances      MEDICATIONS:  Antibiotics:    Neuro:  acetaminophen   Tablet .. 650 milliGRAM(s) Oral every 6 hours PRN  clonazePAM  Tablet 1 milliGRAM(s) Oral every 8 hours PRN  diazepam    Tablet 5 milliGRAM(s) Oral every 8 hours  morphine  - Injectable 4 milliGRAM(s) IV Push every 4 hours PRN  morphine  IR 15 milliGRAM(s) Oral every 4 hours PRN  morphine  IR 30 milliGRAM(s) Oral every 4 hours PRN  ondansetron Injectable 4 milliGRAM(s) IV Push every 6 hours PRN  sertraline 125 milliGRAM(s) Oral daily  traZODone 50 milliGRAM(s) Oral at bedtime    Anticoagulation:  enoxaparin Injectable 40 milliGRAM(s) SubCutaneous at bedtime    OTHER:  artificial  tears Solution 1 Drop(s) Both EYES daily  bisacodyl 5 milliGRAM(s) Oral every 12 hours  bisacodyl Suppository 10 milliGRAM(s) Rectal daily  dextrose 40% Gel 15 Gram(s) Oral once PRN  dextrose 50% Injectable 12.5 Gram(s) IV Push once  dextrose 50% Injectable 12.5 Gram(s) IV Push once  dextrose 50% Injectable 25 Gram(s) IV Push once  dextrose 50% Injectable 25 Gram(s) IV Push once  docusate sodium 100 milliGRAM(s) Oral three times a day  famotidine    Tablet 20 milliGRAM(s) Oral two times a day  fluticasone propionate 50 MICROgram(s)/spray Nasal Spray 1 Spray(s) Both Nostrils two times a day  glucagon  Injectable 1 milliGRAM(s) IntraMuscular once PRN  insulin lispro (HumaLOG) corrective regimen sliding scale   SubCutaneous Before meals and at bedtime  magnesium hydroxide Suspension 30 milliLiter(s) Oral every 12 hours PRN  naloxone Injectable 0.1 milliGRAM(s) IV Push every 3 minutes PRN  polyethylene glycol 3350 17 Gram(s) Oral daily PRN  senna 2 Tablet(s) Oral at bedtime    IVF:  calcium carbonate 1250 mG  + Vitamin D (OsCal 500 + D) 1 Tablet(s) Oral daily  dextrose 5%. 1000 milliLiter(s) IV Continuous <Continuous>    CULTURES:    RADIOLOGY & ADDITIONAL TESTS:      ASSESSMENT:  74y Female s/p day 8 T10-11 laminectomy, T9-12 posterior fusion.    M47.14  Handoff  MEWS Score  Anxiety  Depression  Osteoporosis  Postoperative state  Hypoglycemia  Hyponatremia  Anemia due to blood loss  Suspected deep vein thrombosis (DVT)  Back pain  History of neck surgery  History of back surgery  History of lumbar fusion      PLAN:  NEURO:  -neuro/spinal checks  -Pain control: trazodone, morphine, valium   -Klonopin and sertraline for anxiety   -cont Ca and vit D   -off decadron. f/u cortisol level in am  -monitor thoracic wound- plastics closure (Ronit Genao)  -JPx1 (superficial) monitor output. Keep KEVIN until output < 20ml in 24 hrs.  -SBP normotensive  -room air, satting well  - monitor hypoglycemia. Endocrine following  - monitor H/H. Medicine  following  -D/W     DVT PROPHYLAXIS:  [x] Venodynes                                [x] Heparin/Lovenox    DISPOSITION:Home with Home PT

## 2019-07-30 NOTE — PROGRESS NOTE ADULT - NEUROLOGICAL
negative Alert & oriented; no sensory, motor or coordination deficits, normal reflexes Statement Selected

## 2019-07-30 NOTE — PROGRESS NOTE ADULT - SUBJECTIVE AND OBJECTIVE BOX
INTERVAL HPI/OVERNIGHT EVENTS:    Patient seen and examined at the bedside. She did not have any hypoglycemic episodes in 2 days. She denies having any symptoms of hypoglycemia when she is in the hospital. But she does say that she may have some shaking when she feels she is hungry and needs to eat something.  No nausea, vomiting dizziness.  She says that her appetite is increased since the procedure and has been on decadron and has been consuming a lot of simple sugars. Last decadron 1mg PO dose on 7/28/19 at 11 AM.    FSG  7/29--Breakfast 98, Lunch 69, Dinner 85/73, Bedtime 102  7/30--2AM 107, Breakfast 98, Lunch 73    Pt reports the following symptoms:    CONSTITUTIONAL:  Negative fever or chills, feels well, good appetite  EYES:  Negative  blurry vision or double vision  CARDIOVASCULAR:  Negative for chest pain or palpitations  RESPIRATORY:  Negative for cough, wheezing, or SOB   GASTROINTESTINAL:  Negative for nausea, vomiting, diarrhea, constipation, or abdominal pain  GENITOURINARY:  Negative frequency, urgency or dysuria  NEUROLOGIC:  No headache, confusion, dizziness, lightheadedness    MEDICATIONS  (STANDING):  artificial  tears Solution 1 Drop(s) Both EYES daily  bisacodyl 5 milliGRAM(s) Oral every 12 hours  bisacodyl Suppository 10 milliGRAM(s) Rectal daily  calcium carbonate 1250 mG  + Vitamin D (OsCal 500 + D) 1 Tablet(s) Oral daily  dextrose 5%. 1000 milliLiter(s) (50 mL/Hr) IV Continuous <Continuous>  dextrose 50% Injectable 12.5 Gram(s) IV Push once  dextrose 50% Injectable 12.5 Gram(s) IV Push once  dextrose 50% Injectable 25 Gram(s) IV Push once  dextrose 50% Injectable 25 Gram(s) IV Push once  diazepam    Tablet 5 milliGRAM(s) Oral every 8 hours  docusate sodium 100 milliGRAM(s) Oral three times a day  enoxaparin Injectable 40 milliGRAM(s) SubCutaneous at bedtime  famotidine    Tablet 20 milliGRAM(s) Oral two times a day  fluticasone propionate 50 MICROgram(s)/spray Nasal Spray 1 Spray(s) Both Nostrils two times a day  insulin lispro (HumaLOG) corrective regimen sliding scale   SubCutaneous Before meals and at bedtime  senna 2 Tablet(s) Oral at bedtime  sertraline 125 milliGRAM(s) Oral daily  traZODone 50 milliGRAM(s) Oral at bedtime    MEDICATIONS  (PRN):  acetaminophen   Tablet .. 650 milliGRAM(s) Oral every 6 hours PRN Temp greater or equal to 38C (100.4F), Moderate Pain (4 - 6)  clonazePAM  Tablet 1 milliGRAM(s) Oral every 8 hours PRN anxiety  dextrose 40% Gel 15 Gram(s) Oral once PRN Blood Glucose LESS THAN 70 milliGRAM(s)/deciliter  glucagon  Injectable 1 milliGRAM(s) IntraMuscular once PRN Glucose LESS THAN 70 milligrams/deciliter  magnesium hydroxide Suspension 30 milliLiter(s) Oral every 12 hours PRN Constipation  morphine  - Injectable 4 milliGRAM(s) IV Push every 4 hours PRN breakthrough pain  morphine  IR 15 milliGRAM(s) Oral every 4 hours PRN Moderate Pain (4 - 6)  morphine  IR 30 milliGRAM(s) Oral every 4 hours PRN Severe Pain (7 - 10)  naloxone Injectable 0.1 milliGRAM(s) IV Push every 3 minutes PRN For ANY of the following changes in patient status:  A. RR LESS THAN 10 breaths per minute, B. Oxygen saturation LESS THAN 90%, C. Sedation score of 6  ondansetron Injectable 4 milliGRAM(s) IV Push every 6 hours PRN Nausea  polyethylene glycol 3350 17 Gram(s) Oral daily PRN Constipation      PHYSICAL EXAM  Vital Signs Last 24 Hrs  T(C): 37.3 (30 Jul 2019 09:30), Max: 37.6 (29 Jul 2019 16:22)  T(F): 99.1 (30 Jul 2019 09:30), Max: 99.6 (29 Jul 2019 16:22)  HR: 103 (30 Jul 2019 09:30) (75 - 103)  BP: 148/58 (30 Jul 2019 09:30) (103/64 - 148/58)  BP(mean): --  RR: 17 (30 Jul 2019 09:30) (12 - 18)  SpO2: 95% (30 Jul 2019 09:30) (91% - 97%)    Constitutional: wn/wd in NAD.   HEENT: NCAT, MMM, OP clear, EOMI, no proptosis or lid retraction  Neck: no thyromegaly or palpable thyroid nodules   Respiratory: lungs CTAB.  Cardiovascular: regular rhythm, normal S1 and S2, no audible murmurs, no peripheral edema  GI: soft, NT/ND, no masses/HSM appreciated.  Neurology: no tremors, DTR 2+  Skin: no visible rashes/lesions  Psychiatric: AAO x 3, normal affect/mood.    LABS:                        7.4    11.84 )-----------( 355      ( 30 Jul 2019 07:48 )             23.5     07-30    138  |  103  |  10  ----------------------------<  91  4.1   |  29  |  0.60    Ca    8.4      30 Jul 2019 07:48  Phos  3.9     07-30  Mg     1.9     07-30              HbA1C: 5.3 % (07-23 @ 07:01)    CAPILLARY BLOOD GLUCOSE      POCT Blood Glucose.: 73 mg/dL (30 Jul 2019 11:36)  POCT Blood Glucose.: 98 mg/dL (30 Jul 2019 06:47)  POCT Blood Glucose.: 107 mg/dL (30 Jul 2019 01:58)  POCT Blood Glucose.: 102 mg/dL (29 Jul 2019 22:05)  POCT Blood Glucose.: 73 mg/dL (29 Jul 2019 17:16)  POCT Blood Glucose.: 85 mg/dL (29 Jul 2019 15:18)      WILL Discuss In Rounds  A/P: Ms. Beaza is a 74 year-old woman with a history of depression, osteoporosis now postoperative day 6 from T10-11 laminectomy, T9-12 fusion. We are consulted for hypoglycemia, likely artefactual as patient without autonomic or neuroglycopenic symptoms of hypoglycemia.   1. Hypoglycemia - likely postprandial reactive hypoglycemia  - AM cortisol 13.9  - insulin level was 6.9 when blood glucose was 98.  - If fingerstick glucose again less than 60 mg/dL, can send the following evaluation before administration of glucagon: Glucose, Insulin, C-peptide, Proinsulin levels  - Please obtain 200 AM serum blood glucose  - Please change her diet to carb consistent diet with no simple sugars in it. INTERVAL HPI/OVERNIGHT EVENTS:    Patient seen and examined at the bedside. She did not have any hypoglycemic episodes in 2 days. She denies having any symptoms of hypoglycemia when she is in the hospital. But she does say that she may have some shaking when she feels she is hungry and needs to eat something.  No nausea, vomiting dizziness.  She says that her appetite is increased since the procedure and has been on decadron and has been consuming a lot of simple sugars. Last decadron 1mg PO dose on 7/28/19 at 11 AM.    FSG  7/29--Breakfast 98, Lunch 69, Dinner 85/73, Bedtime 102. She had a slice of veggie pizza at dinner.  7/30--2AM 107, Breakfast 98, Lunch 73. She had eggs, sausage and oatmeal for breakfast.    Pt reports the following symptoms:    CONSTITUTIONAL:  Negative fever or chills, feels well, good appetite  EYES:  Negative  blurry vision or double vision  CARDIOVASCULAR:  Negative for chest pain or palpitations  RESPIRATORY:  Negative for cough, wheezing, or SOB   GASTROINTESTINAL:  Negative for nausea, vomiting, diarrhea, constipation, or abdominal pain  GENITOURINARY:  Negative frequency, urgency or dysuria  NEUROLOGIC:  No headache, confusion, dizziness, lightheadedness    MEDICATIONS  (STANDING):  artificial  tears Solution 1 Drop(s) Both EYES daily  bisacodyl 5 milliGRAM(s) Oral every 12 hours  bisacodyl Suppository 10 milliGRAM(s) Rectal daily  calcium carbonate 1250 mG  + Vitamin D (OsCal 500 + D) 1 Tablet(s) Oral daily  dextrose 5%. 1000 milliLiter(s) (50 mL/Hr) IV Continuous <Continuous>  dextrose 50% Injectable 12.5 Gram(s) IV Push once  dextrose 50% Injectable 12.5 Gram(s) IV Push once  dextrose 50% Injectable 25 Gram(s) IV Push once  dextrose 50% Injectable 25 Gram(s) IV Push once  diazepam    Tablet 5 milliGRAM(s) Oral every 8 hours  docusate sodium 100 milliGRAM(s) Oral three times a day  enoxaparin Injectable 40 milliGRAM(s) SubCutaneous at bedtime  famotidine    Tablet 20 milliGRAM(s) Oral two times a day  fluticasone propionate 50 MICROgram(s)/spray Nasal Spray 1 Spray(s) Both Nostrils two times a day  insulin lispro (HumaLOG) corrective regimen sliding scale   SubCutaneous Before meals and at bedtime  senna 2 Tablet(s) Oral at bedtime  sertraline 125 milliGRAM(s) Oral daily  traZODone 50 milliGRAM(s) Oral at bedtime    MEDICATIONS  (PRN):  acetaminophen   Tablet .. 650 milliGRAM(s) Oral every 6 hours PRN Temp greater or equal to 38C (100.4F), Moderate Pain (4 - 6)  clonazePAM  Tablet 1 milliGRAM(s) Oral every 8 hours PRN anxiety  dextrose 40% Gel 15 Gram(s) Oral once PRN Blood Glucose LESS THAN 70 milliGRAM(s)/deciliter  glucagon  Injectable 1 milliGRAM(s) IntraMuscular once PRN Glucose LESS THAN 70 milligrams/deciliter  magnesium hydroxide Suspension 30 milliLiter(s) Oral every 12 hours PRN Constipation  morphine  - Injectable 4 milliGRAM(s) IV Push every 4 hours PRN breakthrough pain  morphine  IR 15 milliGRAM(s) Oral every 4 hours PRN Moderate Pain (4 - 6)  morphine  IR 30 milliGRAM(s) Oral every 4 hours PRN Severe Pain (7 - 10)  naloxone Injectable 0.1 milliGRAM(s) IV Push every 3 minutes PRN For ANY of the following changes in patient status:  A. RR LESS THAN 10 breaths per minute, B. Oxygen saturation LESS THAN 90%, C. Sedation score of 6  ondansetron Injectable 4 milliGRAM(s) IV Push every 6 hours PRN Nausea  polyethylene glycol 3350 17 Gram(s) Oral daily PRN Constipation      PHYSICAL EXAM  Vital Signs Last 24 Hrs  T(C): 37.3 (30 Jul 2019 09:30), Max: 37.6 (29 Jul 2019 16:22)  T(F): 99.1 (30 Jul 2019 09:30), Max: 99.6 (29 Jul 2019 16:22)  HR: 103 (30 Jul 2019 09:30) (75 - 103)  BP: 148/58 (30 Jul 2019 09:30) (103/64 - 148/58)  BP(mean): --  RR: 17 (30 Jul 2019 09:30) (12 - 18)  SpO2: 95% (30 Jul 2019 09:30) (91% - 97%)    Constitutional: wn/wd in NAD.   HEENT: NCAT, MMM, OP clear, EOMI, no proptosis or lid retraction  Neck: no thyromegaly or palpable thyroid nodules   Respiratory: lungs CTAB.  Cardiovascular: regular rhythm, normal S1 and S2, no audible murmurs, no peripheral edema  GI: soft, NT/ND, no masses/HSM appreciated.  Neurology: no tremors, DTR 2+  Skin: no visible rashes/lesions  Psychiatric: AAO x 3, normal affect/mood.    LABS:                        7.4    11.84 )-----------( 355      ( 30 Jul 2019 07:48 )             23.5     07-30    138  |  103  |  10  ----------------------------<  91  4.1   |  29  |  0.60    Ca    8.4      30 Jul 2019 07:48  Phos  3.9     07-30  Mg     1.9     07-30              HbA1C: 5.3 % (07-23 @ 07:01)    CAPILLARY BLOOD GLUCOSE      POCT Blood Glucose.: 73 mg/dL (30 Jul 2019 11:36)  POCT Blood Glucose.: 98 mg/dL (30 Jul 2019 06:47)  POCT Blood Glucose.: 107 mg/dL (30 Jul 2019 01:58)  POCT Blood Glucose.: 102 mg/dL (29 Jul 2019 22:05)  POCT Blood Glucose.: 73 mg/dL (29 Jul 2019 17:16)  POCT Blood Glucose.: 85 mg/dL (29 Jul 2019 15:18)      WILL Discuss In Rounds  A/P: Ms. Baeza is a 74 year-old woman with a history of depression, osteoporosis now postoperative day 6 from T10-11 laminectomy, T9-12 fusion. We are consulted for hypoglycemia, likely artefactual as patient without autonomic or neuroglycopenic symptoms of hypoglycemia.   1. Hypoglycemia - likely postprandial reactive hypoglycemia  - AM cortisol 13.9  - insulin level was 6.9 when blood glucose was 98.  - If fingerstick glucose again less than 60 mg/dL, can send the following evaluation before administration of glucagon: Glucose, Insulin, C-peptide, Proinsulin levels  - Please obtain 200 AM serum blood glucose  - Please change her diet to carb consistent diet with no simple sugars in it. INTERVAL HPI/OVERNIGHT EVENTS:    Patient seen and examined at the bedside. She did not have any hypoglycemic episodes in 2 days. She denies having any symptoms of hypoglycemia when she is in the hospital. But she does say that she may have some shaking when she feels she is hungry and needs to eat something.  No nausea, vomiting dizziness.  She says that her appetite is increased since the procedure and has been on decadron and has been consuming a lot of simple sugars. Last decadron 1mg PO dose on 7/28/19 at 11 AM.    FSG  7/29--Breakfast 98, Lunch 69, Dinner 85/73, Bedtime 102. She had a slice of veggie pizza at dinner.  7/30--2AM 107, Breakfast 98, Lunch 73. She had eggs, sausage and oatmeal for breakfast.    Pt reports the following symptoms:    CONSTITUTIONAL:  Negative fever or chills, feels well, good appetite  EYES:  Negative  blurry vision or double vision  CARDIOVASCULAR:  Negative for chest pain or palpitations  RESPIRATORY:  Negative for cough, wheezing, or SOB   GASTROINTESTINAL:  Negative for nausea, vomiting, diarrhea, constipation, or abdominal pain  GENITOURINARY:  Negative frequency, urgency or dysuria  NEUROLOGIC:  No headache, confusion, dizziness, lightheadedness    MEDICATIONS  (STANDING):  artificial  tears Solution 1 Drop(s) Both EYES daily  bisacodyl 5 milliGRAM(s) Oral every 12 hours  bisacodyl Suppository 10 milliGRAM(s) Rectal daily  calcium carbonate 1250 mG  + Vitamin D (OsCal 500 + D) 1 Tablet(s) Oral daily  dextrose 5%. 1000 milliLiter(s) (50 mL/Hr) IV Continuous <Continuous>  dextrose 50% Injectable 12.5 Gram(s) IV Push once  dextrose 50% Injectable 12.5 Gram(s) IV Push once  dextrose 50% Injectable 25 Gram(s) IV Push once  dextrose 50% Injectable 25 Gram(s) IV Push once  diazepam    Tablet 5 milliGRAM(s) Oral every 8 hours  docusate sodium 100 milliGRAM(s) Oral three times a day  enoxaparin Injectable 40 milliGRAM(s) SubCutaneous at bedtime  famotidine    Tablet 20 milliGRAM(s) Oral two times a day  fluticasone propionate 50 MICROgram(s)/spray Nasal Spray 1 Spray(s) Both Nostrils two times a day  insulin lispro (HumaLOG) corrective regimen sliding scale   SubCutaneous Before meals and at bedtime  senna 2 Tablet(s) Oral at bedtime  sertraline 125 milliGRAM(s) Oral daily  traZODone 50 milliGRAM(s) Oral at bedtime    MEDICATIONS  (PRN):  acetaminophen   Tablet .. 650 milliGRAM(s) Oral every 6 hours PRN Temp greater or equal to 38C (100.4F), Moderate Pain (4 - 6)  clonazePAM  Tablet 1 milliGRAM(s) Oral every 8 hours PRN anxiety  dextrose 40% Gel 15 Gram(s) Oral once PRN Blood Glucose LESS THAN 70 milliGRAM(s)/deciliter  glucagon  Injectable 1 milliGRAM(s) IntraMuscular once PRN Glucose LESS THAN 70 milligrams/deciliter  magnesium hydroxide Suspension 30 milliLiter(s) Oral every 12 hours PRN Constipation  morphine  - Injectable 4 milliGRAM(s) IV Push every 4 hours PRN breakthrough pain  morphine  IR 15 milliGRAM(s) Oral every 4 hours PRN Moderate Pain (4 - 6)  morphine  IR 30 milliGRAM(s) Oral every 4 hours PRN Severe Pain (7 - 10)  naloxone Injectable 0.1 milliGRAM(s) IV Push every 3 minutes PRN For ANY of the following changes in patient status:  A. RR LESS THAN 10 breaths per minute, B. Oxygen saturation LESS THAN 90%, C. Sedation score of 6  ondansetron Injectable 4 milliGRAM(s) IV Push every 6 hours PRN Nausea  polyethylene glycol 3350 17 Gram(s) Oral daily PRN Constipation      PHYSICAL EXAM  Vital Signs Last 24 Hrs  T(C): 37.3 (30 Jul 2019 09:30), Max: 37.6 (29 Jul 2019 16:22)  T(F): 99.1 (30 Jul 2019 09:30), Max: 99.6 (29 Jul 2019 16:22)  HR: 103 (30 Jul 2019 09:30) (75 - 103)  BP: 148/58 (30 Jul 2019 09:30) (103/64 - 148/58)  BP(mean): --  RR: 17 (30 Jul 2019 09:30) (12 - 18)  SpO2: 95% (30 Jul 2019 09:30) (91% - 97%)    Constitutional: wn/wd in NAD.   HEENT: NCAT, MMM, OP clear, EOMI, no proptosis or lid retraction  Neck: no thyromegaly or palpable thyroid nodules   Respiratory: lungs CTAB.  Cardiovascular: regular rhythm, normal S1 and S2, no audible murmurs, no peripheral edema  GI: soft, NT/ND, no masses/HSM appreciated.  Neurology: no tremors, DTR 2+  Skin: no visible rashes/lesions  Psychiatric: AAO x 3, normal affect/mood.    LABS:                        7.4    11.84 )-----------( 355      ( 30 Jul 2019 07:48 )             23.5     07-30    138  |  103  |  10  ----------------------------<  91  4.1   |  29  |  0.60    Ca    8.4      30 Jul 2019 07:48  Phos  3.9     07-30  Mg     1.9     07-30              HbA1C: 5.3 % (07-23 @ 07:01)    CAPILLARY BLOOD GLUCOSE      POCT Blood Glucose.: 73 mg/dL (30 Jul 2019 11:36)  POCT Blood Glucose.: 98 mg/dL (30 Jul 2019 06:47)  POCT Blood Glucose.: 107 mg/dL (30 Jul 2019 01:58)  POCT Blood Glucose.: 102 mg/dL (29 Jul 2019 22:05)  POCT Blood Glucose.: 73 mg/dL (29 Jul 2019 17:16)  POCT Blood Glucose.: 85 mg/dL (29 Jul 2019 15:18)      A/P: Ms. Baeza is a 74 year-old woman with a history of depression, osteoporosis now postoperative day 6 from T10-11 laminectomy, T9-12 fusion. We are consulted for hypoglycemia, likely artefactual as patient without autonomic or neuroglycopenic symptoms of hypoglycemia.   1. Hypoglycemia - likely postprandial reactive hypoglycemia  - AM cortisol 13.9  - insulin level was 6.9 when blood glucose was 98.  - If fingerstick glucose again less than 60 mg/dL, can send the following evaluation before administration of glucagon: Glucose, Insulin, C-peptide, Proinsulin levels  - Please obtain 200 AM serum blood glucose  - Please change her diet to carb consistent diet with no simple sugars in it.

## 2019-07-30 NOTE — PROGRESS NOTE ADULT - SUBJECTIVE AND OBJECTIVE BOX
Pt seen and examined. No complaints.     Exam:  Surgical incision c/d/i. No infection, separation or tension.   KEVIN drain 50mL/today - serosanguinous

## 2019-07-30 NOTE — PROGRESS NOTE ADULT - ASSESSMENT
A/P: Pt doing well. Optimizing blood glucose.   1. Maintain drain until less than 20mL/24 hours  2. Change aquacel every 3 days  3. Maintain sutures until 4 weeks post-op  4. Pt is amenable to going home with the drain

## 2019-07-31 LAB
ANION GAP SERPL CALC-SCNC: 6 MMOL/L — SIGNIFICANT CHANGE UP (ref 5–17)
BUN SERPL-MCNC: 11 MG/DL — SIGNIFICANT CHANGE UP (ref 7–23)
CALCIUM SERPL-MCNC: 8.6 MG/DL — SIGNIFICANT CHANGE UP (ref 8.4–10.5)
CHLORIDE SERPL-SCNC: 101 MMOL/L — SIGNIFICANT CHANGE UP (ref 96–108)
CO2 SERPL-SCNC: 29 MMOL/L — SIGNIFICANT CHANGE UP (ref 22–31)
CREAT SERPL-MCNC: 0.64 MG/DL — SIGNIFICANT CHANGE UP (ref 0.5–1.3)
GLUCOSE BLDC GLUCOMTR-MCNC: 73 MG/DL — SIGNIFICANT CHANGE UP (ref 70–99)
GLUCOSE BLDC GLUCOMTR-MCNC: 74 MG/DL — SIGNIFICANT CHANGE UP (ref 70–99)
GLUCOSE BLDC GLUCOMTR-MCNC: 94 MG/DL — SIGNIFICANT CHANGE UP (ref 70–99)
GLUCOSE BLDC GLUCOMTR-MCNC: 99 MG/DL — SIGNIFICANT CHANGE UP (ref 70–99)
GLUCOSE SERPL-MCNC: 95 MG/DL — SIGNIFICANT CHANGE UP (ref 70–99)
HCT VFR BLD CALC: 23.4 % — LOW (ref 34.5–45)
HGB BLD-MCNC: 7.5 G/DL — LOW (ref 11.5–15.5)
INSULIN FREE SERPL-ACNC: SIGNIFICANT CHANGE UP
MAGNESIUM SERPL-MCNC: 2.1 MG/DL — SIGNIFICANT CHANGE UP (ref 1.6–2.6)
MCHC RBC-ENTMCNC: 29.9 PG — SIGNIFICANT CHANGE UP (ref 27–34)
MCHC RBC-ENTMCNC: 32.1 GM/DL — SIGNIFICANT CHANGE UP (ref 32–36)
MCV RBC AUTO: 93.2 FL — SIGNIFICANT CHANGE UP (ref 80–100)
NRBC # BLD: 0 /100 WBCS — SIGNIFICANT CHANGE UP (ref 0–0)
PHOSPHATE SERPL-MCNC: 3.9 MG/DL — SIGNIFICANT CHANGE UP (ref 2.5–4.5)
PLATELET # BLD AUTO: 384 K/UL — SIGNIFICANT CHANGE UP (ref 150–400)
POTASSIUM SERPL-MCNC: 4.1 MMOL/L — SIGNIFICANT CHANGE UP (ref 3.5–5.3)
POTASSIUM SERPL-SCNC: 4.1 MMOL/L — SIGNIFICANT CHANGE UP (ref 3.5–5.3)
PROINSULIN SERPL-MCNC: 2.6 PMOL/L — SIGNIFICANT CHANGE UP (ref 0–10)
RBC # BLD: 2.51 M/UL — LOW (ref 3.8–5.2)
RBC # FLD: 15 % — HIGH (ref 10.3–14.5)
SODIUM SERPL-SCNC: 136 MMOL/L — SIGNIFICANT CHANGE UP (ref 135–145)
WBC # BLD: 10.06 K/UL — SIGNIFICANT CHANGE UP (ref 3.8–10.5)
WBC # FLD AUTO: 10.06 K/UL — SIGNIFICANT CHANGE UP (ref 3.8–10.5)

## 2019-07-31 PROCEDURE — 99231 SBSQ HOSP IP/OBS SF/LOW 25: CPT | Mod: GC

## 2019-07-31 PROCEDURE — 99232 SBSQ HOSP IP/OBS MODERATE 35: CPT | Mod: GC

## 2019-07-31 RX ORDER — POLYETHYLENE GLYCOL 3350 17 G/17G
17 POWDER, FOR SOLUTION ORAL ONCE
Refills: 0 | Status: DISCONTINUED | OUTPATIENT
Start: 2019-07-31 | End: 2019-08-02

## 2019-07-31 RX ORDER — TRAMADOL HYDROCHLORIDE 50 MG/1
50 TABLET ORAL EVERY 6 HOURS
Refills: 0 | Status: DISCONTINUED | OUTPATIENT
Start: 2019-07-31 | End: 2019-08-02

## 2019-07-31 RX ADMIN — Medication 5 MILLIGRAM(S): at 05:53

## 2019-07-31 RX ADMIN — Medication 100 MILLIGRAM(S): at 15:12

## 2019-07-31 RX ADMIN — MORPHINE SULFATE 30 MILLIGRAM(S): 50 CAPSULE, EXTENDED RELEASE ORAL at 13:33

## 2019-07-31 RX ADMIN — Medication 1 MILLIGRAM(S): at 12:41

## 2019-07-31 RX ADMIN — MORPHINE SULFATE 30 MILLIGRAM(S): 50 CAPSULE, EXTENDED RELEASE ORAL at 17:13

## 2019-07-31 RX ADMIN — TRAMADOL HYDROCHLORIDE 50 MILLIGRAM(S): 50 TABLET ORAL at 09:43

## 2019-07-31 RX ADMIN — Medication 10 MILLIGRAM(S): at 18:16

## 2019-07-31 RX ADMIN — MORPHINE SULFATE 30 MILLIGRAM(S): 50 CAPSULE, EXTENDED RELEASE ORAL at 12:40

## 2019-07-31 RX ADMIN — Medication 1 SPRAY(S): at 05:53

## 2019-07-31 RX ADMIN — ENOXAPARIN SODIUM 40 MILLIGRAM(S): 100 INJECTION SUBCUTANEOUS at 21:24

## 2019-07-31 RX ADMIN — POLYETHYLENE GLYCOL 3350 17 GRAM(S): 17 POWDER, FOR SOLUTION ORAL at 12:31

## 2019-07-31 RX ADMIN — Medication 10 MILLIGRAM(S): at 18:28

## 2019-07-31 RX ADMIN — Medication 50 MILLIGRAM(S): at 21:24

## 2019-07-31 RX ADMIN — FAMOTIDINE 20 MILLIGRAM(S): 10 INJECTION INTRAVENOUS at 18:16

## 2019-07-31 RX ADMIN — Medication 1 TABLET(S): at 12:28

## 2019-07-31 RX ADMIN — Medication 5 MILLIGRAM(S): at 21:24

## 2019-07-31 RX ADMIN — Medication 100 MILLIGRAM(S): at 05:53

## 2019-07-31 RX ADMIN — MORPHINE SULFATE 30 MILLIGRAM(S): 50 CAPSULE, EXTENDED RELEASE ORAL at 17:45

## 2019-07-31 RX ADMIN — MORPHINE SULFATE 30 MILLIGRAM(S): 50 CAPSULE, EXTENDED RELEASE ORAL at 03:48

## 2019-07-31 RX ADMIN — FAMOTIDINE 20 MILLIGRAM(S): 10 INJECTION INTRAVENOUS at 05:53

## 2019-07-31 RX ADMIN — Medication 1 DROP(S): at 12:27

## 2019-07-31 RX ADMIN — SERTRALINE 125 MILLIGRAM(S): 25 TABLET, FILM COATED ORAL at 12:28

## 2019-07-31 RX ADMIN — MORPHINE SULFATE 30 MILLIGRAM(S): 50 CAPSULE, EXTENDED RELEASE ORAL at 04:20

## 2019-07-31 RX ADMIN — Medication 10 MILLIGRAM(S): at 05:53

## 2019-07-31 RX ADMIN — Medication 5 MILLIGRAM(S): at 15:12

## 2019-07-31 NOTE — PROGRESS NOTE ADULT - SUBJECTIVE AND OBJECTIVE BOX
INTERVAL HPI/OVERNIGHT EVENTS:    atient seen and examined at the bedside. She did not have any hypoglycemic episodes in 3 days. She denies having any symptoms of hypoglycemia when she is in the hospital. She is complaining of constipation which is impacting her appetite.   No nausea, vomiting dizziness.    FSG  7/30--Breakfast 98, Lunch 73, Dinner 94, Bedtime 97.   7/31--Breakfast 94, Lunch 74.     Pt reports the following symptoms:    CONSTITUTIONAL:  Negative fever or chills, feels well, good appetite  EYES:  Negative  blurry vision or double vision  CARDIOVASCULAR:  Negative for chest pain or palpitations  RESPIRATORY:  Negative for cough, wheezing, or SOB   GASTROINTESTINAL:  Negative for nausea, vomiting, diarrhea, constipation, or abdominal pain  GENITOURINARY:  Negative frequency, urgency or dysuria  NEUROLOGIC:  No headache, confusion, dizziness, lightheadedness    MEDICATIONS  (STANDING):  artificial  tears Solution 1 Drop(s) Both EYES daily  bisacodyl 10 milliGRAM(s) Oral every 12 hours  bisacodyl Suppository 10 milliGRAM(s) Rectal daily  calcium carbonate 1250 mG  + Vitamin D (OsCal 500 + D) 1 Tablet(s) Oral daily  dextrose 5%. 1000 milliLiter(s) (50 mL/Hr) IV Continuous <Continuous>  dextrose 50% Injectable 12.5 Gram(s) IV Push once  dextrose 50% Injectable 12.5 Gram(s) IV Push once  dextrose 50% Injectable 25 Gram(s) IV Push once  dextrose 50% Injectable 25 Gram(s) IV Push once  diazepam    Tablet 5 milliGRAM(s) Oral every 8 hours  docusate sodium 100 milliGRAM(s) Oral three times a day  enoxaparin Injectable 40 milliGRAM(s) SubCutaneous at bedtime  famotidine    Tablet 20 milliGRAM(s) Oral two times a day  fluticasone propionate 50 MICROgram(s)/spray Nasal Spray 1 Spray(s) Both Nostrils two times a day  insulin lispro (HumaLOG) corrective regimen sliding scale   SubCutaneous Before meals and at bedtime  iron sucrose IVPB 200 milliGRAM(s) IV Intermittent every 24 hours  polyethylene glycol 3350 17 Gram(s) Oral daily  senna 2 Tablet(s) Oral at bedtime  sertraline 125 milliGRAM(s) Oral daily  traZODone 50 milliGRAM(s) Oral at bedtime    MEDICATIONS  (PRN):  acetaminophen   Tablet .. 650 milliGRAM(s) Oral every 6 hours PRN Temp greater or equal to 38C (100.4F), Mild Pain (1 - 3)  aluminum hydroxide/magnesium hydroxide/simethicone Suspension 30 milliLiter(s) Oral every 4 hours PRN Dyspepsia  clonazePAM  Tablet 1 milliGRAM(s) Oral every 8 hours PRN anxiety  dextrose 40% Gel 15 Gram(s) Oral once PRN Blood Glucose LESS THAN 70 milliGRAM(s)/deciliter  glucagon  Injectable 1 milliGRAM(s) IntraMuscular once PRN Glucose LESS THAN 70 milligrams/deciliter  magnesium hydroxide Suspension 30 milliLiter(s) Oral every 12 hours PRN Constipation  morphine  - Injectable 4 milliGRAM(s) IV Push every 4 hours PRN breakthrough pain  morphine  IR 30 milliGRAM(s) Oral every 4 hours PRN Severe Pain (7 - 10)  naloxone Injectable 0.1 milliGRAM(s) IV Push every 3 minutes PRN For ANY of the following changes in patient status:  A. RR LESS THAN 10 breaths per minute, B. Oxygen saturation LESS THAN 90%, C. Sedation score of 6  ondansetron Injectable 4 milliGRAM(s) IV Push every 6 hours PRN Nausea  traMADol 50 milliGRAM(s) Oral every 6 hours PRN Moderate Pain (4 - 6)      PHYSICAL EXAM  Vital Signs Last 24 Hrs  T(C): 36.8 (31 Jul 2019 16:05), Max: 36.9 (31 Jul 2019 14:15)  T(F): 98.2 (31 Jul 2019 16:05), Max: 98.4 (31 Jul 2019 14:15)  HR: 97 (31 Jul 2019 16:05) (76 - 100)  BP: 107/81 (31 Jul 2019 16:05) (105/49 - 116/57)  BP(mean): --  RR: 16 (31 Jul 2019 16:05) (15 - 17)  SpO2: 100% (31 Jul 2019 16:05) (96% - 100%)    Constitutional: wn/wd in NAD.   HEENT: NCAT, MMM, OP clear, EOMI, no proptosis or lid retraction  Neck: no thyromegaly or palpable thyroid nodules   Respiratory: lungs CTAB.  Cardiovascular: regular rhythm, normal S1 and S2, no audible murmurs, no peripheral edema  GI: soft, NT/ND, no masses/HSM appreciated.  Neurology: no tremors, DTR 2+, dressing and drain at spine  Skin: no visible rashes/lesions  Psychiatric: AAO x 3, normal affect/mood.    LABS:                        7.5    10.06 )-----------( 384      ( 31 Jul 2019 07:53 )             23.4     07-31    136  |  101  |  11  ----------------------------<  95  4.1   |  29  |  0.64    Ca    8.6      31 Jul 2019 07:53  Phos  3.9     07-31  Mg     2.1     07-31    HbA1C: 5.3 % (07-23 @ 07:01)    CAPILLARY BLOOD GLUCOSE      POCT Blood Glucose.: 73 mg/dL (31 Jul 2019 17:12)  POCT Blood Glucose.: 74 mg/dL (31 Jul 2019 12:06)  POCT Blood Glucose.: 94 mg/dL (31 Jul 2019 06:48)  POCT Blood Glucose.: 97 mg/dL (30 Jul 2019 21:58)    A/P: Ms. Baeza is a 74 year-old woman with a history of depression, osteoporosis now postoperative day 6 from T10-11 laminectomy, T9-12 fusion. We are consulted for hypoglycemia, likely artefactual as patient without autonomic or neuroglycopenic symptoms of hypoglycemia.   1. Hypoglycemia - likely postprandial reactive hypoglycemia  - AM cortisol 13.9  - insulin level was 6.9 when blood glucose was 98.  - If fingerstick glucose again less than 60 mg/dL, can send the following evaluation before administration of glucagon: Glucose, Insulin, C-peptide, Proinsulin levels  - Please obtain 200 AM serum blood glucose  - Please change her diet to carb consistent diet with no simple sugars in it.

## 2019-07-31 NOTE — PROGRESS NOTE ADULT - SUBJECTIVE AND OBJECTIVE BOX
Interval Events: Reviewed  Patient seen and examined at bedside.    Patient is a 74y old  Female who presents with a chief complaint of Back Surgery (30 Jul 2019 09:56)  she is doing well    PAST MEDICAL & SURGICAL HISTORY:  Anxiety  Depression  Osteoporosis  History of neck surgery  History of back surgery: x4  History of lumbar fusion      MEDICATIONS:  Pulmonary:    Antimicrobials:    Anticoagulants:  enoxaparin Injectable 40 milliGRAM(s) SubCutaneous at bedtime    Cardiac:      Allergies    iodine (Unknown)    Intolerances        Vital Signs Last 24 Hrs  T(C): 36.8 (31 Jul 2019 16:05), Max: 36.9 (31 Jul 2019 14:15)  T(F): 98.2 (31 Jul 2019 16:05), Max: 98.4 (31 Jul 2019 14:15)  HR: 97 (31 Jul 2019 16:05) (76 - 100)  BP: 107/81 (31 Jul 2019 16:05) (105/49 - 116/57)  BP(mean): --  RR: 16 (31 Jul 2019 16:05) (15 - 17)  SpO2: 100% (31 Jul 2019 16:05) (96% - 100%)    07-30 @ 07:01  -  07-31 @ 07:00  --------------------------------------------------------  IN: 760 mL / OUT: 1290 mL / NET: -530 mL          LABS:      CBC Full  -  ( 31 Jul 2019 07:53 )  WBC Count : 10.06 K/uL  RBC Count : 2.51 M/uL  Hemoglobin : 7.5 g/dL  Hematocrit : 23.4 %  Platelet Count - Automated : 384 K/uL  Mean Cell Volume : 93.2 fl  Mean Cell Hemoglobin : 29.9 pg  Mean Cell Hemoglobin Concentration : 32.1 gm/dL  Auto Neutrophil # : x  Auto Lymphocyte # : x  Auto Monocyte # : x  Auto Eosinophil # : x  Auto Basophil # : x  Auto Neutrophil % : x  Auto Lymphocyte % : x  Auto Monocyte % : x  Auto Eosinophil % : x  Auto Basophil % : x    07-31    136  |  101  |  11  ----------------------------<  95  4.1   |  29  |  0.64    Ca    8.6      31 Jul 2019 07:53  Phos  3.9     07-31  Mg     2.1     07-31                          RADIOLOGY & ADDITIONAL STUDIES (The following images were personally reviewed):  Ybarra:                                     No  Urine output:                       adequate  DVT prophylaxis:                 Yes  Flattus:                                  Yes  Bowel movement:            yes

## 2019-07-31 NOTE — PROGRESS NOTE ADULT - SUBJECTIVE AND OBJECTIVE BOX
No events overnight.     7/22: POD#0: s/p T10-11 lami, T9-12 fusion.  No overnight events.  7/23: POD#1: PCA Dilaudid discontinued. Pain well controlled.  7/24: POD#2: No major events overnight. Pain is well controlled.  7/25: POD #3: PAL overnight. Aquacel dressing changed today. Lightheaded with PT, will reattempt tomorrow. Pt recommending home w/ home PT   7/26: POD #4: No issues overnight, pain well controlled. HMV removed today.  7/27: POD#5: No issues overnight. KEVIN drain possible removal today pending PRS recs. Pending BM. Started on flonase for congestion.   7/28: POD#6: Hyponatremia likely chronic  from Zoloft use. Sinus congestion improved with flonase. several hypoglycemia events. superficial KEVIN drain in place.  7/29: POD#7: hypoglycemia continues post prandial . Changed to diabetic diet to avoid simple sugars. Drain still in place.  7/30: POD#8: PAL  7/31: POD 9: PAL overnight, appreciate endo input    OVERNIGHT EVENTS:  Vital Signs Last 24 Hrs  T(C): 37.2 (30 Jul 2019 17:31), Max: 37.3 (30 Jul 2019 09:30)  T(F): 98.9 (30 Jul 2019 17:31), Max: 99.1 (30 Jul 2019 09:30)  HR: 77 (31 Jul 2019 04:38) (76 - 103)  BP: 105/49 (31 Jul 2019 04:38) (99/51 - 148/58)  BP(mean): --  RR: 17 (31 Jul 2019 04:38) (16 - 17)  SpO2: 97% (31 Jul 2019 04:38) (95% - 97%)    I&O's Summary    29 Jul 2019 07:01  -  30 Jul 2019 07:00  --------------------------------------------------------  IN: 180 mL / OUT: 450 mL / NET: -270 mL    30 Jul 2019 07:01  -  31 Jul 2019 05:50  --------------------------------------------------------  IN: 760 mL / OUT: 940 mL / NET: -180 mL        PHYSICAL EXAM:  PHYSICAL EXAM:  Neurological:  AxOX3  5/5 motor x4ext  Incision/Wound:C/D/I    TUBES/LINES:  [] Ybarra  [] Lumbar Drain  [x] Wound Drains  [] Others      DIET:  [] NPO  [x] Mechanical  [] Tube feeds    LABS:                        7.4    11.84 )-----------( 355      ( 30 Jul 2019 07:48 )             23.5     07-30    138  |  103  |  10  ----------------------------<  91  4.1   |  29  |  0.60    Ca    8.4      30 Jul 2019 07:48  Phos  3.9     07-30  Mg     1.9     07-30              CAPILLARY BLOOD GLUCOSE      POCT Blood Glucose.: 97 mg/dL (30 Jul 2019 21:58)  POCT Blood Glucose.: 94 mg/dL (30 Jul 2019 17:15)  POCT Blood Glucose.: 73 mg/dL (30 Jul 2019 11:36)  POCT Blood Glucose.: 98 mg/dL (30 Jul 2019 06:47)      Drug Levels: [] N/A    CSF Analysis: [] N/A      Allergies    iodine (Unknown)    Intolerances      MEDICATIONS:  Antibiotics:    Neuro:  acetaminophen   Tablet .. 650 milliGRAM(s) Oral every 6 hours PRN  clonazePAM  Tablet 1 milliGRAM(s) Oral every 8 hours PRN  diazepam    Tablet 5 milliGRAM(s) Oral every 8 hours  morphine  - Injectable 4 milliGRAM(s) IV Push every 4 hours PRN  morphine  IR 15 milliGRAM(s) Oral every 4 hours PRN  morphine  IR 30 milliGRAM(s) Oral every 4 hours PRN  ondansetron Injectable 4 milliGRAM(s) IV Push every 6 hours PRN  sertraline 125 milliGRAM(s) Oral daily  traZODone 50 milliGRAM(s) Oral at bedtime    Anticoagulation:  enoxaparin Injectable 40 milliGRAM(s) SubCutaneous at bedtime    OTHER:  aluminum hydroxide/magnesium hydroxide/simethicone Suspension 30 milliLiter(s) Oral every 4 hours PRN  artificial  tears Solution 1 Drop(s) Both EYES daily  bisacodyl 10 milliGRAM(s) Oral every 12 hours  bisacodyl Suppository 10 milliGRAM(s) Rectal daily  dextrose 40% Gel 15 Gram(s) Oral once PRN  dextrose 50% Injectable 12.5 Gram(s) IV Push once  dextrose 50% Injectable 12.5 Gram(s) IV Push once  dextrose 50% Injectable 25 Gram(s) IV Push once  dextrose 50% Injectable 25 Gram(s) IV Push once  docusate sodium 100 milliGRAM(s) Oral three times a day  famotidine    Tablet 20 milliGRAM(s) Oral two times a day  fluticasone propionate 50 MICROgram(s)/spray Nasal Spray 1 Spray(s) Both Nostrils two times a day  glucagon  Injectable 1 milliGRAM(s) IntraMuscular once PRN  insulin lispro (HumaLOG) corrective regimen sliding scale   SubCutaneous Before meals and at bedtime  magnesium hydroxide Suspension 30 milliLiter(s) Oral every 12 hours PRN  naloxone Injectable 0.1 milliGRAM(s) IV Push every 3 minutes PRN  polyethylene glycol 3350 17 Gram(s) Oral daily  senna 2 Tablet(s) Oral at bedtime    IVF:  calcium carbonate 1250 mG  + Vitamin D (OsCal 500 + D) 1 Tablet(s) Oral daily  dextrose 5%. 1000 milliLiter(s) IV Continuous <Continuous>  iron sucrose IVPB 200 milliGRAM(s) IV Intermittent every 24 hours    CULTURES:    RADIOLOGY & ADDITIONAL TESTS:      ASSESSMENT:  74y Female s/p day 9 T10-11 laminectomy, T9-12 posterior fusion.    PLAN:  NEURO:  neuro checks  pain control  trend KEVIN  appreciate endo recs  diet as tolerated  bowel regimen  lovenox ppx  no abx  d/w Dr. Retana

## 2019-08-01 ENCOUNTER — TRANSCRIPTION ENCOUNTER (OUTPATIENT)
Age: 74
End: 2019-08-01

## 2019-08-01 LAB
ANION GAP SERPL CALC-SCNC: 9 MMOL/L — SIGNIFICANT CHANGE UP (ref 5–17)
BUN SERPL-MCNC: 11 MG/DL — SIGNIFICANT CHANGE UP (ref 7–23)
CALCIUM SERPL-MCNC: 8.4 MG/DL — SIGNIFICANT CHANGE UP (ref 8.4–10.5)
CHLORIDE SERPL-SCNC: 99 MMOL/L — SIGNIFICANT CHANGE UP (ref 96–108)
CO2 SERPL-SCNC: 27 MMOL/L — SIGNIFICANT CHANGE UP (ref 22–31)
CREAT SERPL-MCNC: 0.63 MG/DL — SIGNIFICANT CHANGE UP (ref 0.5–1.3)
GLUCOSE BLDC GLUCOMTR-MCNC: 75 MG/DL — SIGNIFICANT CHANGE UP (ref 70–99)
GLUCOSE BLDC GLUCOMTR-MCNC: 93 MG/DL — SIGNIFICANT CHANGE UP (ref 70–99)
GLUCOSE BLDC GLUCOMTR-MCNC: 98 MG/DL — SIGNIFICANT CHANGE UP (ref 70–99)
GLUCOSE BLDC GLUCOMTR-MCNC: 99 MG/DL — SIGNIFICANT CHANGE UP (ref 70–99)
GLUCOSE SERPL-MCNC: 96 MG/DL — SIGNIFICANT CHANGE UP (ref 70–99)
HCT VFR BLD CALC: 24.3 % — LOW (ref 34.5–45)
HGB BLD-MCNC: 7.5 G/DL — LOW (ref 11.5–15.5)
MCHC RBC-ENTMCNC: 29.1 PG — SIGNIFICANT CHANGE UP (ref 27–34)
MCHC RBC-ENTMCNC: 30.9 GM/DL — LOW (ref 32–36)
MCV RBC AUTO: 94.2 FL — SIGNIFICANT CHANGE UP (ref 80–100)
NRBC # BLD: 0 /100 WBCS — SIGNIFICANT CHANGE UP (ref 0–0)
PLATELET # BLD AUTO: 408 K/UL — HIGH (ref 150–400)
POTASSIUM SERPL-MCNC: 4.1 MMOL/L — SIGNIFICANT CHANGE UP (ref 3.5–5.3)
POTASSIUM SERPL-SCNC: 4.1 MMOL/L — SIGNIFICANT CHANGE UP (ref 3.5–5.3)
RBC # BLD: 2.58 M/UL — LOW (ref 3.8–5.2)
RBC # FLD: 15 % — HIGH (ref 10.3–14.5)
SODIUM SERPL-SCNC: 135 MMOL/L — SIGNIFICANT CHANGE UP (ref 135–145)
WBC # BLD: 9.43 K/UL — SIGNIFICANT CHANGE UP (ref 3.8–10.5)
WBC # FLD AUTO: 9.43 K/UL — SIGNIFICANT CHANGE UP (ref 3.8–10.5)

## 2019-08-01 PROCEDURE — 99232 SBSQ HOSP IP/OBS MODERATE 35: CPT | Mod: GC

## 2019-08-01 RX ORDER — CLONAZEPAM 1 MG
1 TABLET ORAL EVERY 8 HOURS
Refills: 0 | Status: DISCONTINUED | OUTPATIENT
Start: 2019-08-01 | End: 2019-08-02

## 2019-08-01 RX ADMIN — TRAMADOL HYDROCHLORIDE 50 MILLIGRAM(S): 50 TABLET ORAL at 09:37

## 2019-08-01 RX ADMIN — Medication 50 MILLIGRAM(S): at 22:09

## 2019-08-01 RX ADMIN — Medication 1 SPRAY(S): at 06:29

## 2019-08-01 RX ADMIN — ENOXAPARIN SODIUM 40 MILLIGRAM(S): 100 INJECTION SUBCUTANEOUS at 22:09

## 2019-08-01 RX ADMIN — Medication 100 MILLIGRAM(S): at 06:29

## 2019-08-01 RX ADMIN — Medication 1 TABLET(S): at 12:35

## 2019-08-01 RX ADMIN — MORPHINE SULFATE 30 MILLIGRAM(S): 50 CAPSULE, EXTENDED RELEASE ORAL at 02:50

## 2019-08-01 RX ADMIN — MORPHINE SULFATE 30 MILLIGRAM(S): 50 CAPSULE, EXTENDED RELEASE ORAL at 11:18

## 2019-08-01 RX ADMIN — TRAMADOL HYDROCHLORIDE 50 MILLIGRAM(S): 50 TABLET ORAL at 10:10

## 2019-08-01 RX ADMIN — MORPHINE SULFATE 30 MILLIGRAM(S): 50 CAPSULE, EXTENDED RELEASE ORAL at 01:53

## 2019-08-01 RX ADMIN — FAMOTIDINE 20 MILLIGRAM(S): 10 INJECTION INTRAVENOUS at 06:29

## 2019-08-01 RX ADMIN — Medication 5 MILLIGRAM(S): at 14:59

## 2019-08-01 RX ADMIN — POLYETHYLENE GLYCOL 3350 17 GRAM(S): 17 POWDER, FOR SOLUTION ORAL at 12:35

## 2019-08-01 RX ADMIN — Medication 5 MILLIGRAM(S): at 06:29

## 2019-08-01 RX ADMIN — Medication 10 MILLIGRAM(S): at 06:29

## 2019-08-01 RX ADMIN — Medication 1 MILLIGRAM(S): at 11:24

## 2019-08-01 RX ADMIN — MORPHINE SULFATE 30 MILLIGRAM(S): 50 CAPSULE, EXTENDED RELEASE ORAL at 22:09

## 2019-08-01 RX ADMIN — Medication 10 MILLIGRAM(S): at 12:36

## 2019-08-01 RX ADMIN — MORPHINE SULFATE 30 MILLIGRAM(S): 50 CAPSULE, EXTENDED RELEASE ORAL at 15:10

## 2019-08-01 RX ADMIN — Medication 10 MILLIGRAM(S): at 17:37

## 2019-08-01 RX ADMIN — MORPHINE SULFATE 30 MILLIGRAM(S): 50 CAPSULE, EXTENDED RELEASE ORAL at 23:08

## 2019-08-01 RX ADMIN — SERTRALINE 125 MILLIGRAM(S): 25 TABLET, FILM COATED ORAL at 12:34

## 2019-08-01 RX ADMIN — Medication 100 MILLIGRAM(S): at 14:59

## 2019-08-01 RX ADMIN — Medication 100 MILLIGRAM(S): at 22:09

## 2019-08-01 RX ADMIN — MORPHINE SULFATE 30 MILLIGRAM(S): 50 CAPSULE, EXTENDED RELEASE ORAL at 11:50

## 2019-08-01 RX ADMIN — MORPHINE SULFATE 30 MILLIGRAM(S): 50 CAPSULE, EXTENDED RELEASE ORAL at 15:40

## 2019-08-01 RX ADMIN — FAMOTIDINE 20 MILLIGRAM(S): 10 INJECTION INTRAVENOUS at 17:35

## 2019-08-01 NOTE — PROCEDURE NOTE - GENERAL PROCEDURE DETAILS
Site cleansed with chlorhexidine, anchoring stitch removed with suture scissors, drain placed off suction, drain pulled without difficulty, steri strips placed a exit site of drain. No residual bleeding or oozing noted.
hemovac removed using sterile technique.

## 2019-08-01 NOTE — DISCHARGE NOTE PROVIDER - HOSPITAL COURSE
HPI:         Taken from Dr. Retana's office note 7/17/2019    "Patient presents to be evaluated for surgical intervention for progressively worsening gait disturbance.    She reports a h/o cervical spine surgery, multiple lumbar spine procedures with the most recent x 3 years ago for scoliosis repair. Since her lower back surgery she has persistent lower back pain and most recent mid upper back pain with onset x 1 year ago.        She has undergone multiple conservative measures ranging from the use of oral pain medications (currently utilizes Percocet and Morphine tabs), NADIR's and as last resort a spinal cord stimulator trial which have all failed at provided lasting symptom relief.        She is off balance and best describes her gait a "walking on a train""        Hospital Course:     7/22: POD#0: s/p T10-11 lami, T9-12 fusion.  No overnight events.    7/23: POD#1: PCA Dilaudid discontinued. Pain well controlled.    7/24: POD#2: No major events overnight. Pain is well controlled.    7/25: POD #3: PAL overnight. Aquacel dressing changed today. Lightheaded with PT, will reattempt tomorrow. Pt recommending home w/ home PT     7/26: POD #4: No issues overnight, pain well controlled. HMV removed today.    7/27: POD#5: No issues overnight. KEVIN drain possible removal today pending PRS recs. Pending BM. Started on flonase for congestion.     7/28: POD#6: Hyponatremia likely chronic  from Zoloft use. Sinus congestion improved with flonase. several hypoglycemia events. superficial KEVIN drain in place.    7/29: POD#7: hypoglycemia continues post prandial . Changed to diabetic diet to avoid simple sugars. Drain still in place.    7/30: POD#8: PAL    7/31: POD 9: PAL overnight, appreciate endo input    8/1 PAL overnight, IV Iron for anemia, KEVIN removed by neurosurgery per plastics surgery, + BM, Endo recs are appreciated, neuro exam unchanged        Pt to be discharged home with home physical therapy. She is ambulating independently, voiding without difficulty, tolerating a PO diet and pain is well controlled. She will be discharged home with outpatient follow up. HPI:         Taken from Dr. Retana's office note 7/17/2019    "Patient presents to be evaluated for surgical intervention for progressively worsening gait disturbance.    She reports a h/o cervical spine surgery, multiple lumbar spine procedures with the most recent x 3 years ago for scoliosis repair. Since her lower back surgery she has persistent lower back pain and most recent mid upper back pain with onset x 1 year ago.        She has undergone multiple conservative measures ranging from the use of oral pain medications (currently utilizes Percocet and Morphine tabs), NADIR's and as last resort a spinal cord stimulator trial which have all failed at provided lasting symptom relief.        She is off balance and best describes her gait a "walking on a train""        Hospital Course:     7/22: POD#0: s/p T10-11 lami, T9-12 fusion.  No overnight events.    7/23: POD#1: PCA Dilaudid discontinued. Pain well controlled.    7/24: POD#2: No major events overnight. Pain is well controlled.    7/25: POD #3: PAL overnight. Aquacel dressing changed today. Lightheaded with PT, will reattempt tomorrow. Pt recommending home w/ home PT     7/26: POD #4: No issues overnight, pain well controlled. HMV removed today.    7/27: POD#5: No issues overnight. KEVIN drain possible removal today pending PRS recs. Pending BM. Started on flonase for congestion.     7/28: POD#6: Hyponatremia likely chronic  from Zoloft use. Sinus congestion improved with flonase. several hypoglycemia events. superficial KEVIN drain in place.    7/29: POD#7: hypoglycemia continues post prandial . Changed to diabetic diet to avoid simple sugars. Drain still in place.    7/30: POD#8: PAL    7/31: POD 9: PAL overnight, appreciate endo input    8/1: PLA overnight, IV Iron for anemia, KEVIN removed by neurosurgery per plastics surgery, + BM, Endo recs are appreciated, neuro exam unchanged    8/2: PAL overnight        Pt to be discharged home with home physical therapy. She is ambulating independently, voiding without difficulty, tolerating a PO diet and pain is well controlled. She will be discharged home with outpatient follow up.

## 2019-08-01 NOTE — DISCHARGE NOTE PROVIDER - NSDCFUADDINST_GEN_ALL_CORE_FT
Keep your wound clean and dry.  Once a day, ask a family member to check your incision for signs of infection such as: Redness, Swelling and tenderness,  Drainage  You may use stairs as tolerated.  Cover your entire incision with 4x4 gauze and paper tape. If it gets wet, dry it off completely and replace the dressing daily. Please shower daily.   No tub baths or swimming for two weeks.   Take pain medicine as prescribed.   You may find it helpful to take it for morning stiffness or for soreness when trying to sleep.  Pain medication can cause constipation. Eating food with fiber such as fruits and  vegetables, and drinking liquids may help prevent constipation.  • Avoid bending, twisting or heavy lifting.  • Do not sit for more than 20 minutes each time you sit.  • Do not wear pants that are tight on your incision.    Call you doctor immediately if you have:  • Any new numbness, tingling, or weakness in your arms and legs.  Worsening pain not responsive to pain meds, Fever of 101° F or more.  You must call the day you are discharged to make a follow- up appointment with your doctor.  To make your appointment for any questions, please call: [phone]. Keep your wound clean and dry.  Once a day, ask a family member to check your incision for signs of infection such as: Redness, Swelling and tenderness,  Drainage  You may use stairs as tolerated.  Cover your entire incision with 4x4 gauze and paper tape. If it gets wet, dry it off completely and replace the dressing daily. Please shower daily.  Incision sutures to remain in place x 4 weeks, per plastic surgery.  No tub baths or swimming for two weeks.  Take pain medicine as prescribed.  You may find it helpful to take it for morning stiffness or for soreness when trying to sleep.  Pain medication can cause constipation. Eating food with fiber such as fruits and  vegetables, and drinking liquids may help prevent constipation.  • Avoid bending, twisting or heavy lifting.  • Do not sit for more than 20 minutes each time you sit.  • Do not wear pants that are tight on your incision.    Call you doctor immediately if you have:  • Any new numbness, tingling, or weakness in your arms and legs.  Worsening pain not responsive to pain meds, Fever of 101° F or more.  You must call the day you are discharged to make a follow- up appointment with your doctor.  To make your appointment for any questions, please call: [phone].

## 2019-08-01 NOTE — DISCHARGE NOTE PROVIDER - CARE PROVIDER_API CALL
Ronit Genao)  Plastic Surgery Surgery  [address] [place] [address]  Phone: [phone]  Fax: [phone]  Follow Up Time:     Talon Retana)  Neurological Surgery  [address], [address] [place] [address]  Phone: [phone]  Fax: [phone]  Follow Up Time:     Kyle Salguero)  EndocrinologyMetabDiabetes; Internal Medicine  [address] [address]  Phone: [phone]  Fax: [phone]  Follow Up Time: Ronit Genao)  Plastic Surgery Surgery  [address] [place] [address]  Phone: [phone]  Fax: [phone]  Follow Up Time:     Talon Retana)  Neurological Surgery  [address], [address] [place] [address]  Phone: [phone]  Fax: [phone]  Follow Up Time:     Kyle Salguero)  EndocrinologyMetabDiabetes; Internal Medicine  [address] [address]  Phone: [phone]  Fax: [phone]  Follow Up Time:     Lola Duque)  Critical Care Medicine; Pulmonary Disease  [address], [address] [place] [address]  Phone: [phone]  Fax: [phone]  Follow Up Time:

## 2019-08-01 NOTE — PROGRESS NOTE ADULT - ASSESSMENT
A/P: Pt doing well s/p back closure with muscle flaps.   1. Maintain drain until less than 20mL  2. Change Aquacel every 3 days  3. Dispo per Neurosurgery  4. Sutures to remain in place for 4 weeks

## 2019-08-01 NOTE — DISCHARGE NOTE PROVIDER - CARE PROVIDERS DIRECT ADDRESSES
,DirectAddress_Unknown,[email or web address],DirectAddress_Unknown ,DirectAddress_Unknown,[email or web address],DirectAddress_Unknown,[email or web address]

## 2019-08-01 NOTE — PROGRESS NOTE ADULT - SUBJECTIVE AND OBJECTIVE BOX
Interval Events: Reviewed  Patient seen and examined at bedside.    Patient is a 74y old  Female who presents with a chief complaint of Back Surgery (01 Aug 2019 06:49)    she is doing well.  They changed the dressing and did well over night  PAST MEDICAL & SURGICAL HISTORY:  Anxiety  Depression  Osteoporosis  History of neck surgery  History of back surgery: x4  History of lumbar fusion      MEDICATIONS:  Pulmonary:    Antimicrobials:    Anticoagulants:  enoxaparin Injectable 40 milliGRAM(s) SubCutaneous at bedtime    Cardiac:      Allergies    iodine (Unknown)    Intolerances        Vital Signs Last 24 Hrs  T(C): 36.8 (01 Aug 2019 05:30), Max: 36.9 (31 Jul 2019 14:15)  T(F): 98.3 (01 Aug 2019 05:30), Max: 98.4 (31 Jul 2019 14:15)  HR: 78 (01 Aug 2019 05:30) (76 - 100)  BP: 92/53 (01 Aug 2019 05:30) (92/53 - 107/81)  BP(mean): --  RR: 17 (01 Aug 2019 05:30) (15 - 17)  SpO2: 98% (01 Aug 2019 05:30) (97% - 100%)    07-31 @ 07:01  -  08-01 @ 07:00  --------------------------------------------------------  IN: 0 mL / OUT: 50 mL / NET: -50 mL          LABS:      CBC Full  -  ( 01 Aug 2019 07:19 )  WBC Count : 9.43 K/uL  RBC Count : 2.58 M/uL  Hemoglobin : 7.5 g/dL  Hematocrit : 24.3 %  Platelet Count - Automated : 408 K/uL  Mean Cell Volume : 94.2 fl  Mean Cell Hemoglobin : 29.1 pg  Mean Cell Hemoglobin Concentration : 30.9 gm/dL  Auto Neutrophil # : x  Auto Lymphocyte # : x  Auto Monocyte # : x  Auto Eosinophil # : x  Auto Basophil # : x  Auto Neutrophil % : x  Auto Lymphocyte % : x  Auto Monocyte % : x  Auto Eosinophil % : x  Auto Basophil % : x    08-01    135  |  99  |  11  ----------------------------<  96  4.1   |  27  |  0.63    Ca    8.4      01 Aug 2019 07:19  Phos  3.9     07-31  Mg     2.1     07-31                          RADIOLOGY & ADDITIONAL STUDIES (The following images were personally reviewed):  Ybarra:                                     No  Urine output:                       adequate  DVT prophylaxis:                 Yes  Flattus:                                  Yes  Bowel movement:              No

## 2019-08-01 NOTE — PROGRESS NOTE ADULT - SUBJECTIVE AND OBJECTIVE BOX
7/22: POD#0: s/p T10-11 lami, T9-12 fusion.  No overnight events.  7/23: POD#1: PCA Dilaudid discontinued. Pain well controlled.  7/24: POD#2: No major events overnight. Pain is well controlled.  7/25: POD #3: PAL overnight. Aquacel dressing changed today. Lightheaded with PT, will reattempt tomorrow. Pt recommending home w/ home PT   7/26: POD #4: No issues overnight, pain well controlled. HMV removed today.  7/27: POD#5: No issues overnight. KEVIN drain possible removal today pending PRS recs. Pending BM. Started on flonase for congestion.   7/28: POD#6: Hyponatremia likely chronic  from Zoloft use. Sinus congestion improved with flonase. several hypoglycemia events. superficial KEVIN drain in place.  7/29: POD#7: hypoglycemia continues post prandial . Changed to diabetic diet to avoid simple sugars. Drain still in place.  7/30: POD#8: PAL  7/31: POD 9: PAL overnight, appreciate endo input  8/1 PAL overnight, IV IRon for anemia, KEVIN in place as per plastics, + BM, Endo recs are appreciated, neuro exam unchanged since yesterday    ICU Vital Signs Last 24 Hrs  T(C): 36.8 (01 Aug 2019 05:30), Max: 36.9 (31 Jul 2019 14:15)  T(F): 98.3 (01 Aug 2019 05:30), Max: 98.4 (31 Jul 2019 14:15)  HR: 78 (01 Aug 2019 05:30) (76 - 100)  BP: 92/53 (01 Aug 2019 05:30) (92/53 - 107/81)  BP(mean): --  ABP: --  ABP(mean): --  RR: 17 (01 Aug 2019 05:30) (15 - 17)  SpO2: 98% (01 Aug 2019 05:30) (97% - 100%)      PHYSICAL EXAM:  PHYSICAL EXAM:  Neurological:  AxOX3  5/5 motor x4ext  Incision/Wound:C/D/I    TUBES/LINES:  [] Ybarra  [] Lumbar Drain  [x] Wound Drains  [] Others      DIET:  [] NPO  [x] Mechanical  [] Tube feeds    LABS:                                             7.5    10.06 )-----------( 384      ( 31 Jul 2019 07:53 )             23.4             CAPILLARY BLOOD GLUCOSE      POCT Blood Glucose.: 97 mg/dL (30 Jul 2019 21:58)  POCT Blood Glucose.: 94 mg/dL (30 Jul 2019 17:15)  POCT Blood Glucose.: 73 mg/dL (30 Jul 2019 11:36)  POCT Blood Glucose.: 98 mg/dL (30 Jul 2019 06:47)      Drug Levels: [] N/A    CSF Analysis: [] N/A      Allergies    iodine (Unknown)    Intolerances      MEDICATIONS:  Antibiotics:    Neuro:  acetaminophen   Tablet .. 650 milliGRAM(s) Oral every 6 hours PRN  clonazePAM  Tablet 1 milliGRAM(s) Oral every 8 hours PRN  diazepam    Tablet 5 milliGRAM(s) Oral every 8 hours  morphine  - Injectable 4 milliGRAM(s) IV Push every 4 hours PRN  morphine  IR 15 milliGRAM(s) Oral every 4 hours PRN  morphine  IR 30 milliGRAM(s) Oral every 4 hours PRN  ondansetron Injectable 4 milliGRAM(s) IV Push every 6 hours PRN  sertraline 125 milliGRAM(s) Oral daily  traZODone 50 milliGRAM(s) Oral at bedtime    Anticoagulation:  enoxaparin Injectable 40 milliGRAM(s) SubCutaneous at bedtime    OTHER:  aluminum hydroxide/magnesium hydroxide/simethicone Suspension 30 milliLiter(s) Oral every 4 hours PRN  artificial  tears Solution 1 Drop(s) Both EYES daily  bisacodyl 10 milliGRAM(s) Oral every 12 hours  bisacodyl Suppository 10 milliGRAM(s) Rectal daily  dextrose 40% Gel 15 Gram(s) Oral once PRN  dextrose 50% Injectable 12.5 Gram(s) IV Push once  dextrose 50% Injectable 12.5 Gram(s) IV Push once  dextrose 50% Injectable 25 Gram(s) IV Push once  dextrose 50% Injectable 25 Gram(s) IV Push once  docusate sodium 100 milliGRAM(s) Oral three times a day  famotidine    Tablet 20 milliGRAM(s) Oral two times a day  fluticasone propionate 50 MICROgram(s)/spray Nasal Spray 1 Spray(s) Both Nostrils two times a day  glucagon  Injectable 1 milliGRAM(s) IntraMuscular once PRN  insulin lispro (HumaLOG) corrective regimen sliding scale   SubCutaneous Before meals and at bedtime  magnesium hydroxide Suspension 30 milliLiter(s) Oral every 12 hours PRN  naloxone Injectable 0.1 milliGRAM(s) IV Push every 3 minutes PRN  polyethylene glycol 3350 17 Gram(s) Oral daily  senna 2 Tablet(s) Oral at bedtime    IVF:  calcium carbonate 1250 mG  + Vitamin D (OsCal 500 + D) 1 Tablet(s) Oral daily  dextrose 5%. 1000 milliLiter(s) IV Continuous <Continuous>  iron sucrose IVPB 200 milliGRAM(s) IV Intermittent every 24 hours    CULTURES:    RADIOLOGY & ADDITIONAL TESTS:      ASSESSMENT:  74y Female s/p day 9 T10-11 laminectomy, T9-12 posterior fusion.    PLAN:  NEURO:  +BM  neuro checks  pain control  trend KEVIN  appreciate endo recs  diet as tolerated  bowel regimen  lovenox ppx  Cont IV Iron for post-op anemia  d/w Dr. Retana    Assessment:  Present when checked    []  GCS  E   V  M     Heart Failure: []Acute, [] acute on chronic , []chronic  Heart Failure:  [] Diastolic (HFpEF), [] Systolic (HFrEF), []Combined (HFpEF and HFrEF), [] RHF, [] Pulm HTN, [] Other    [] LUIZA, [] ATN, [] AIN, [] other  [] CKD1, [] CKD2, [] CKD 3, [] CKD 4, [] CKD 5, []ESRD    Encephalopathy: [] Metabolic, [] Hepatic, [] toxic, [] Neurological, [] Other    Abnormal Nurtitional Status: [] malnurtition (see nutrition note), [ ]underweight: BMI < 19, [] morbid obesity: BMI >40, [] Cachexia    [] Sepsis  [] hypovolemic shock,[] cardiogenic shock, [] hemorrhagic shock, [] neuogenic shock  [] Acute Respiratory Failure  []Cerebral edema, [] Brain compression/ herniation,   [] Functional quadriplegia  [] Acute blood loss anemia

## 2019-08-01 NOTE — DISCHARGE NOTE PROVIDER - PROVIDER TOKENS
PROVIDER:[TOKEN:[[id number]]],PROVIDER:[TOKEN:[[id number]]],PROVIDER:[TOKEN:[[id number]]] PROVIDER:[TOKEN:[[id number]]],PROVIDER:[TOKEN:[[id number]]],PROVIDER:[TOKEN:[[id number]]],PROVIDER:[TOKEN:[[id number]]]

## 2019-08-01 NOTE — PROGRESS NOTE ADULT - SUBJECTIVE AND OBJECTIVE BOX
INTERVAL HPI/OVERNIGHT EVENTS:    Patient is a 74y old  Female who presents with a chief complaint of Back Surgery (01 Aug 2019 06:49)      Pt reports the following symptoms:    CONSTITUTIONAL:  Negative fever or chills, feels well, good appetite  EYES:  Negative  blurry vision or double vision  CARDIOVASCULAR:  Negative for chest pain or palpitations  RESPIRATORY:  Negative for cough, wheezing, or SOB   GASTROINTESTINAL:  Negative for nausea, vomiting, diarrhea, constipation, or abdominal pain  GENITOURINARY:  Negative frequency, urgency or dysuria  NEUROLOGIC:  No headache, confusion, dizziness, lightheadedness    MEDICATIONS  (STANDING):  artificial  tears Solution 1 Drop(s) Both EYES daily  bisacodyl 10 milliGRAM(s) Oral every 12 hours  bisacodyl Suppository 10 milliGRAM(s) Rectal daily  calcium carbonate 1250 mG  + Vitamin D (OsCal 500 + D) 1 Tablet(s) Oral daily  dextrose 5%. 1000 milliLiter(s) (50 mL/Hr) IV Continuous <Continuous>  dextrose 50% Injectable 12.5 Gram(s) IV Push once  dextrose 50% Injectable 12.5 Gram(s) IV Push once  dextrose 50% Injectable 25 Gram(s) IV Push once  dextrose 50% Injectable 25 Gram(s) IV Push once  diazepam    Tablet 5 milliGRAM(s) Oral every 8 hours  docusate sodium 100 milliGRAM(s) Oral three times a day  enoxaparin Injectable 40 milliGRAM(s) SubCutaneous at bedtime  famotidine    Tablet 20 milliGRAM(s) Oral two times a day  fluticasone propionate 50 MICROgram(s)/spray Nasal Spray 1 Spray(s) Both Nostrils two times a day  insulin lispro (HumaLOG) corrective regimen sliding scale   SubCutaneous Before meals and at bedtime  iron sucrose IVPB 200 milliGRAM(s) IV Intermittent every 24 hours  polyethylene glycol 3350 17 Gram(s) Oral daily  polyethylene glycol 3350 17 Gram(s) Oral once  saline laxative (FLEET) Rectal Enema 1 Enema Rectal once  senna 2 Tablet(s) Oral at bedtime  sertraline 125 milliGRAM(s) Oral daily  traZODone 50 milliGRAM(s) Oral at bedtime    MEDICATIONS  (PRN):  acetaminophen   Tablet .. 650 milliGRAM(s) Oral every 6 hours PRN Temp greater or equal to 38C (100.4F), Mild Pain (1 - 3)  aluminum hydroxide/magnesium hydroxide/simethicone Suspension 30 milliLiter(s) Oral every 4 hours PRN Dyspepsia  clonazePAM  Tablet 1 milliGRAM(s) Oral every 8 hours PRN anxiety  dextrose 40% Gel 15 Gram(s) Oral once PRN Blood Glucose LESS THAN 70 milliGRAM(s)/deciliter  glucagon  Injectable 1 milliGRAM(s) IntraMuscular once PRN Glucose LESS THAN 70 milligrams/deciliter  magnesium hydroxide Suspension 30 milliLiter(s) Oral every 12 hours PRN Constipation  morphine  - Injectable 4 milliGRAM(s) IV Push every 4 hours PRN breakthrough pain  morphine  IR 30 milliGRAM(s) Oral every 4 hours PRN Severe Pain (7 - 10)  naloxone Injectable 0.1 milliGRAM(s) IV Push every 3 minutes PRN For ANY of the following changes in patient status:  A. RR LESS THAN 10 breaths per minute, B. Oxygen saturation LESS THAN 90%, C. Sedation score of 6  ondansetron Injectable 4 milliGRAM(s) IV Push every 6 hours PRN Nausea  traMADol 50 milliGRAM(s) Oral every 6 hours PRN Moderate Pain (4 - 6)      PHYSICAL EXAM  Vital Signs Last 24 Hrs  T(C): 36.8 (01 Aug 2019 08:55), Max: 36.9 (31 Jul 2019 14:15)  T(F): 98.3 (01 Aug 2019 08:55), Max: 98.4 (31 Jul 2019 14:15)  HR: 83 (01 Aug 2019 08:55) (76 - 97)  BP: 94/57 (01 Aug 2019 08:55) (92/53 - 107/81)  BP(mean): --  RR: 17 (01 Aug 2019 08:55) (16 - 17)  SpO2: 100% (01 Aug 2019 08:55) (97% - 100%)    Constitutional: wn/wd in NAD.   HEENT: NCAT, MMM, OP clear, EOMI, no proptosis or lid retraction  Neck: no thyromegaly or palpable thyroid nodules   Respiratory: lungs CTAB.  Cardiovascular: regular rhythm, normal S1 and S2, no audible murmurs, no peripheral edema  GI: soft, NT/ND, no masses/HSM appreciated.  Neurology: no tremors, DTR 2+  Skin: no visible rashes/lesions  Psychiatric: AAO x 3, normal affect/mood.    LABS:                        7.5    9.43  )-----------( 408      ( 01 Aug 2019 07:19 )             24.3     08-01    135  |  99  |  11  ----------------------------<  96  4.1   |  27  |  0.63    Ca    8.4      01 Aug 2019 07:19  Phos  3.9     07-31  Mg     2.1     07-31              HbA1C: 5.3 % (07-23 @ 07:01)    CAPILLARY BLOOD GLUCOSE      POCT Blood Glucose.: 93 mg/dL (01 Aug 2019 06:37)  POCT Blood Glucose.: 99 mg/dL (31 Jul 2019 21:52)  POCT Blood Glucose.: 73 mg/dL (31 Jul 2019 17:12)  POCT Blood Glucose.: 74 mg/dL (31 Jul 2019 12:06)      Insulin Sliding Scale requirements X 24 Hours:    RADIOLOGY & ADDITIONAL TESTS:    A/P: 74y Female with history of DM type II presenting for       1.  DM -     Please continue           units lantus at bedtime  / in the morning and        units lispro with meals and lispro moderate / low dose sliding scale 4 times daily with meals and at bedtime.  Please continue consistent carbohydrate diet.      Goal FSG is   Will continue to monitor   For discharge, pt can continue    Pt can follow up at discharge with [facility] by calling  to make an appointment.   Will discuss case with     and update primary team

## 2019-08-01 NOTE — DISCHARGE NOTE PROVIDER - NSDCFUADDAPPT_GEN_ALL_CORE_FT
Please follow up with Dr. Retana 2 weeks after your surgery: Monday, Aug 5th. Please call his office to schedule your appointment.   Please also call Dr. Genao (plastics surgery) and Dr. Salguero (endocrinology) to schedule your follow up appointments.   Please follow up with your primary care doctor Please follow up with Dr. Retana (Neurosurgery) 2 weeks after your surgery: Monday, Aug 5th. Please call his office to schedule your appointment.   Please also call Dr. Genao (Plastic Surgery) and Dr. Salguero (Endocrinology) to schedule your follow up appointments.   Please call Dr. Duque (Internal Medicine) to manage your anemia.  Please follow up with your Pain Management doctor.

## 2019-08-01 NOTE — PROCEDURE NOTE - NSCOMPLICATION_GEN_A_CORE
[facility]  Interventional Cardiology History & Physical  2019          H&P:   Atilio Szymanski [[id number]] (presented as Anonymous Lorrie Castillo) is a 69 year old male with a history of HTN, HLD who was admitted on 2019 for PEA arrest. Patient had reportedly been complaining of exertional dyspnea for ~5 weeks. Tonight, he was more fatigued and developed lower extremity edema. His wife called his daughter, who decided to bring him into the hospital. On the way into [facility], via private vehicle, patient became unresponsive. EMS was called, where he was found to be in PEA. ACLS was started and he was transferred to [facility]. On arrival to OSH, he remained in PEA. He had several rounds of epi, bicarb, magnesium, calcium.  Rhythm checks with persistent PEA. Chest x-ray showed pulmonary edema and bilateral chest tubes were placed, 500 cc out of the left chest tube, 200 cc the right chest tube.  Right femoral arterial line was placed, with some pulsatile in the waveform, however no palpable pulse in ACLS was continued during this time.  After over an hour of active ACLS, patient was demonstrating some movement and spontaneously opening eyes, so [facility] was called and VT/VF program was activated for ECMO.  Patient arrived to the Cath Lab at 2250 and ECMO was started at 2303.    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 After ECMO cannulation, he had 7 stents total placed, to LM 
x1, pLAD, pLCx, x4 to RCA. He had considerable amount of bleeding from chest tubes bilaterally, Hgb dropped from 14.0 to 6.9. He received massive transfusion protocol. Also received Factor VII.         Medications:   I have reviewed this patient's current medications    No past medical history on file.    No family history on file.  Social History     Socioeconomic History     Marital status: Not on file     Spouse name: Not on file     Number of children: Not on file     Years of education: Not on file     Highest education level: Not on file   Occupational History     Not on file   Social Needs     Financial resource strain: Not on file     Food insecurity:     Worry: Not on file     Inability: Not on file     Transportation needs:     Medical: Not on file     Non-medical: Not on file   Tobacco Use     Smoking status: Not on file   Substance and Sexual Activity     Alcohol use: Not on file     Drug use: Not on file     Sexual activity: Not on file   Lifestyle     Physical activity:     Days per week: Not on file     Minutes per session: Not on file     Stress: Not on file   Relationships     Social connections:     Talks on phone: Not on file     Gets together: Not on file     Attends Restorationist service: Not on file     Active member of club or organization: Not on file     Attends meetings of clubs or organizations: Not on file     Relationship status: Not on file     Intimate partner violence:     Fear of current or ex partner: Not on file     Emotionally abused: Not on file     Physically abused: Not on file     Forced sexual activity: Not on file   Other Topics Concern     Not on file   Social History Narrative     Not on file            Review of Systems:   Review of systems not obtained due to patient factors - intubation and abnormal mental status            Physical Exam:   Vitals: There were no vitals taken for this visit.  BMI= There is no height or weight on file to calculate BMI.   GENERAL APPEARANCE: 
Intubated, sedated. NAD.  HEENT: No icterus, ETT in place, OG tube in place  CARDIOVASCULAR: regular rate and rhythm, normal S1 and S2, no S3 or S4 and no murmur, click or rub. Normal PMI. Pulses dopplerable.  RESP: Coarse bilaterally. Mechanical ventilation.    GASTRO: Soft, bowel sounds hypoactive but present  GENITOURINARY: Bailey in place.  EXTREMITIES: Cool, 1+ edema, pulses dopplered, as above. VA ECMO cannulas in right groin, ThermoGard  NEURO: Sedated and intubated. Fent and Versed for sedation, as below.  INTEGUMENTARY: No rashes. Cannula/Line sites CDI  LINES/TUBES/DRAINS: (noted below) V-A ECMO Cannulas L groin, arterial sheath and ThermoGard in R groin. R radial Arterial line. ETT. OG. Bailey Catheter.    Vent Settings:             Arterial Blood Gas:   Recent Labs   Lab 07/31/19  2302   PH 7.04*   PCO2 66*   PO2 112*   HCO3 18*     There were no vitals filed for this visit.No intake/output data recorded.  No lab results found in last 7 days.  No components found for: URINE   No lab results found in last 7 days.     No data recorded   No lab results found in last 7 days.    Invalid input(s): NEUT, LYM, EOS  No lab results found in last 7 days.  No lab results found in last 7 days.    Lines:           Data:   All lab results reviewed    No results found for this or any previous visit (from the past 24 hour(s)).           Assessment and Plan:   Atilio Szymanski [[id number]] (Anonymous [facility]) is a 69 year old male with a history of HTN, HLD who was admitted on 7/31/2019 for PEA arrest. Arrest at home, no bystander CPR. Transferred from [facility]. ~120 min before placed on ECMO. 3V disease involving culprit LM.    Neurology: Intubated, sedated, paralyzed. Cooled to 36 degrees.  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 
HTN  # HLD Refractory PEA arrest. 3V disease. ORIANA to LM x1, pLAD x1, pLCx x1, RCA x4.  Peripheral V-A ECMO inserted for refractory PEA. Approximately 120 min downtime. LA initially 9.0 on arrival.   TTE: complete ordered  --wean pressors/inotropes as able  --echo tomorrow  --continue ASA 81mg and ticagrelor 90mg BID  --hold temp at 36 given considerable bleeding  --ACT goal 180-200  --hold lipitor for now given likely hepatic injury during arrest  --hold ACE/ARB for now given likely reduced renal fxn after arrest  --holding beta blocker given shock      Pulmonary:  # Acute hypoxic/hypercarbic respiratory failure  # Pulmonary edema  # B/l Pleural effusion s/p b/l chest tube at OSH  # Possible diffuse alveolar hemorrhage Vent Settings:  ETT in stable position.  Now weaning vent requirements.  CXR: Lines in stable position. Pulmonary edema, b/l pleural effusions s/p b/l chest tube placement at OSH (not sterile).  --wean vent as able  --daily CXR  Chest CT  --Q2h ABGs for now      GI and Nutrition: No known medical hx.  --monitor BID LFTs  --NPO for now - nutrition consult pending feeding tube placement  --bowel regimen - on hold for now  --GI Prophylaxis: PPI      Renal, Fluid and Electrolytes: Cr unknown baseline. 1.25 on arrival.  --monitor urine output  --maintain K>3 and Mg>2      Infectious Disease:  # Presumed aspiration No signs of infection. Leukocytosis c/w arrest. Blood cultures collected.   --vancomycin/zosyn x5 days for ECMO  --daily blood cultures  --monitor for signs of infection given cooling, lines, and leukocytosis   Hematology and Oncology:  # Acute blood loss anemia Receiving heparin for ECMO and ASA/ticagrelor for ORIANA.   --cryo PRN fibrinogen < 200; FFP for INR >2  --Transfuse for Hgb<10  --holding heparin given bleeding, will allow heparin through distal reperfusion cannula  - S/p massive transfusion protocol, s/p Factor VII  --US LE w/ arterial duplex per ECMO protocol   --DVT PPX: Heparin as 
above   Endocrinology: No known medical history. BG elevated.  --insulin gtt  --f/u HgbA1c    Lines: L femoral arterial and venous ECMO cannulae July 31, 2019  R femoral arterial line July 31, 2019  R radial arterial line July 31, 2019  ETT July 31, 2019  Bailey catheter July 31, 2019  OG tube July 31, 2019  Restraint: needed    Current lines are required for patient management       Family update by me today: Yes     Code Status:    The pt was discussed and evaluated with Dr. Solomon Ambrocio, attending physician, who agrees with the assessment and plan above.     Thuan Barrett MD  [facility] Heart  Cardiology  [phone]    
no complications
no complications

## 2019-08-01 NOTE — PROGRESS NOTE ADULT - SUBJECTIVE AND OBJECTIVE BOX
Pt seen and examined.   Pain controlled. No acute events.    Exam:  Surgical incision c/d/i. No separation or drainage.   KEVIN Drain 50mL/24 hours serosanguinous

## 2019-08-02 ENCOUNTER — TRANSCRIPTION ENCOUNTER (OUTPATIENT)
Age: 74
End: 2019-08-02

## 2019-08-02 VITALS
DIASTOLIC BLOOD PRESSURE: 63 MMHG | HEART RATE: 67 BPM | RESPIRATION RATE: 17 BRPM | SYSTOLIC BLOOD PRESSURE: 104 MMHG | TEMPERATURE: 98 F

## 2019-08-02 LAB
ANION GAP SERPL CALC-SCNC: 4 MMOL/L — LOW (ref 5–17)
BUN SERPL-MCNC: 9 MG/DL — SIGNIFICANT CHANGE UP (ref 7–23)
C PEPTIDE SERPL-MCNC: 1 NG/ML — LOW (ref 1.1–4.4)
CALCIUM SERPL-MCNC: 8.3 MG/DL — LOW (ref 8.4–10.5)
CHLORIDE SERPL-SCNC: 102 MMOL/L — SIGNIFICANT CHANGE UP (ref 96–108)
CO2 SERPL-SCNC: 30 MMOL/L — SIGNIFICANT CHANGE UP (ref 22–31)
CREAT SERPL-MCNC: 0.65 MG/DL — SIGNIFICANT CHANGE UP (ref 0.5–1.3)
GLUCOSE BLDC GLUCOMTR-MCNC: 106 MG/DL — HIGH (ref 70–99)
GLUCOSE BLDC GLUCOMTR-MCNC: 60 MG/DL — LOW (ref 70–99)
GLUCOSE BLDC GLUCOMTR-MCNC: 92 MG/DL — SIGNIFICANT CHANGE UP (ref 70–99)
GLUCOSE SERPL-MCNC: 91 MG/DL — SIGNIFICANT CHANGE UP (ref 70–99)
HCT VFR BLD CALC: 22.6 % — LOW (ref 34.5–45)
HGB BLD-MCNC: 7.1 G/DL — LOW (ref 11.5–15.5)
INSULIN SERPL-MCNC: 3.2 UU/ML — SIGNIFICANT CHANGE UP (ref 2.6–24.9)
MCHC RBC-ENTMCNC: 29.2 PG — SIGNIFICANT CHANGE UP (ref 27–34)
MCHC RBC-ENTMCNC: 31.4 GM/DL — LOW (ref 32–36)
MCV RBC AUTO: 93 FL — SIGNIFICANT CHANGE UP (ref 80–100)
NRBC # BLD: 0 /100 WBCS — SIGNIFICANT CHANGE UP (ref 0–0)
PLATELET # BLD AUTO: 407 K/UL — HIGH (ref 150–400)
POTASSIUM SERPL-MCNC: 4.5 MMOL/L — SIGNIFICANT CHANGE UP (ref 3.5–5.3)
POTASSIUM SERPL-SCNC: 4.5 MMOL/L — SIGNIFICANT CHANGE UP (ref 3.5–5.3)
RBC # BLD: 2.43 M/UL — LOW (ref 3.8–5.2)
RBC # FLD: 15 % — HIGH (ref 10.3–14.5)
SODIUM SERPL-SCNC: 136 MMOL/L — SIGNIFICANT CHANGE UP (ref 135–145)
WBC # BLD: 8.79 K/UL — SIGNIFICANT CHANGE UP (ref 3.8–10.5)
WBC # FLD AUTO: 8.79 K/UL — SIGNIFICANT CHANGE UP (ref 3.8–10.5)

## 2019-08-02 PROCEDURE — 80048 BASIC METABOLIC PNL TOTAL CA: CPT

## 2019-08-02 PROCEDURE — 97530 THERAPEUTIC ACTIVITIES: CPT

## 2019-08-02 PROCEDURE — 36415 COLL VENOUS BLD VENIPUNCTURE: CPT

## 2019-08-02 PROCEDURE — 86901 BLOOD TYPING SEROLOGIC RH(D): CPT

## 2019-08-02 PROCEDURE — 86803 HEPATITIS C AB TEST: CPT

## 2019-08-02 PROCEDURE — 83735 ASSAY OF MAGNESIUM: CPT

## 2019-08-02 PROCEDURE — 88304 TISSUE EXAM BY PATHOLOGIST: CPT

## 2019-08-02 PROCEDURE — 85018 HEMOGLOBIN: CPT

## 2019-08-02 PROCEDURE — 84132 ASSAY OF SERUM POTASSIUM: CPT

## 2019-08-02 PROCEDURE — 94640 AIRWAY INHALATION TREATMENT: CPT

## 2019-08-02 PROCEDURE — 82962 GLUCOSE BLOOD TEST: CPT

## 2019-08-02 PROCEDURE — 97161 PT EVAL LOW COMPLEX 20 MIN: CPT

## 2019-08-02 PROCEDURE — 84206 ASSAY OF PROINSULIN: CPT

## 2019-08-02 PROCEDURE — 84295 ASSAY OF SERUM SODIUM: CPT

## 2019-08-02 PROCEDURE — 85027 COMPLETE CBC AUTOMATED: CPT

## 2019-08-02 PROCEDURE — 82010 KETONE BODYS QUAN: CPT

## 2019-08-02 PROCEDURE — 83036 HEMOGLOBIN GLYCOSYLATED A1C: CPT

## 2019-08-02 PROCEDURE — C1889: CPT

## 2019-08-02 PROCEDURE — 86850 RBC ANTIBODY SCREEN: CPT

## 2019-08-02 PROCEDURE — 97116 GAIT TRAINING THERAPY: CPT

## 2019-08-02 PROCEDURE — 93970 EXTREMITY STUDY: CPT

## 2019-08-02 PROCEDURE — 97535 SELF CARE MNGMENT TRAINING: CPT

## 2019-08-02 PROCEDURE — 76000 FLUOROSCOPY <1 HR PHYS/QHP: CPT

## 2019-08-02 PROCEDURE — 83527 ASSAY OF INSULIN: CPT

## 2019-08-02 PROCEDURE — 82533 TOTAL CORTISOL: CPT

## 2019-08-02 PROCEDURE — 83525 ASSAY OF INSULIN: CPT

## 2019-08-02 PROCEDURE — 86900 BLOOD TYPING SEROLOGIC ABO: CPT

## 2019-08-02 PROCEDURE — 84100 ASSAY OF PHOSPHORUS: CPT

## 2019-08-02 PROCEDURE — 99232 SBSQ HOSP IP/OBS MODERATE 35: CPT | Mod: GC

## 2019-08-02 PROCEDURE — 95940 IONM IN OPERATNG ROOM 15 MIN: CPT

## 2019-08-02 PROCEDURE — 85025 COMPLETE CBC W/AUTO DIFF WBC: CPT

## 2019-08-02 PROCEDURE — 97162 PT EVAL MOD COMPLEX 30 MIN: CPT

## 2019-08-02 PROCEDURE — 82330 ASSAY OF CALCIUM: CPT

## 2019-08-02 PROCEDURE — 84681 ASSAY OF C-PEPTIDE: CPT

## 2019-08-02 PROCEDURE — C1713: CPT

## 2019-08-02 RX ORDER — MORPHINE SULFATE 50 MG/1
1 CAPSULE, EXTENDED RELEASE ORAL
Qty: 42 | Refills: 0
Start: 2019-08-02 | End: 2019-08-08

## 2019-08-02 RX ORDER — FERROUS SULFATE 325(65) MG
1 TABLET ORAL
Qty: 14 | Refills: 0
Start: 2019-08-02 | End: 2019-08-15

## 2019-08-02 RX ORDER — SENNA PLUS 8.6 MG/1
2 TABLET ORAL
Qty: 0 | Refills: 0 | DISCHARGE
Start: 2019-08-02

## 2019-08-02 RX ORDER — MORPHINE SULFATE 50 MG/1
0 CAPSULE, EXTENDED RELEASE ORAL
Qty: 0 | Refills: 0 | DISCHARGE

## 2019-08-02 RX ORDER — TRAMADOL HYDROCHLORIDE 50 MG/1
1 TABLET ORAL
Qty: 28 | Refills: 0
Start: 2019-08-02 | End: 2019-08-08

## 2019-08-02 RX ORDER — CELECOXIB 200 MG/1
0 CAPSULE ORAL
Qty: 0 | Refills: 0 | DISCHARGE

## 2019-08-02 RX ORDER — ACETAMINOPHEN 500 MG
2 TABLET ORAL
Qty: 0 | Refills: 0 | DISCHARGE
Start: 2019-08-02

## 2019-08-02 RX ADMIN — SERTRALINE 125 MILLIGRAM(S): 25 TABLET, FILM COATED ORAL at 13:17

## 2019-08-02 RX ADMIN — Medication 5 MILLIGRAM(S): at 13:16

## 2019-08-02 RX ADMIN — MORPHINE SULFATE 30 MILLIGRAM(S): 50 CAPSULE, EXTENDED RELEASE ORAL at 06:13

## 2019-08-02 RX ADMIN — Medication 1 DROP(S): at 13:17

## 2019-08-02 RX ADMIN — Medication 10 MILLIGRAM(S): at 06:15

## 2019-08-02 RX ADMIN — IRON SUCROSE 110 MILLIGRAM(S): 20 INJECTION, SOLUTION INTRAVENOUS at 14:31

## 2019-08-02 RX ADMIN — MORPHINE SULFATE 30 MILLIGRAM(S): 50 CAPSULE, EXTENDED RELEASE ORAL at 06:53

## 2019-08-02 RX ADMIN — Medication 100 MILLIGRAM(S): at 06:13

## 2019-08-02 RX ADMIN — Medication 1 MILLIGRAM(S): at 07:21

## 2019-08-02 RX ADMIN — Medication 1 TABLET(S): at 13:17

## 2019-08-02 RX ADMIN — FAMOTIDINE 20 MILLIGRAM(S): 10 INJECTION INTRAVENOUS at 06:13

## 2019-08-02 NOTE — PROGRESS NOTE ADULT - PROBLEM SELECTOR PLAN 2
Na increased with fluid restriction.  Na is stable at 135
Na increased with fluid restriction.  Follow
Na increased with fluid restriction.  Na is stable at 135
Na increased with fluid restriction.  Follow

## 2019-08-02 NOTE — PROGRESS NOTE ADULT - PROBLEM SELECTOR PLAN 1
Hb stable and had  iron infusion and start oral iron supplementation.  Follow Hb and non indication for transfusion.  She had history of anemia and required iron infusions in the past.  She agreed to take oral iron and she will follow with her PMD
Hb stable and will have iron infusion and start oral iron supplementation
Hb stable and had  iron infusion and start oral iron supplementation.  Follow Hb and non indication for transfusion
Hb stable and had  iron infusion and start oral iron supplementation.  Follow Hb and non indication for transfusion.  Another iron infusion today

## 2019-08-02 NOTE — DISCHARGE NOTE NURSING/CASE MANAGEMENT/SOCIAL WORK - NSDCFUADDAPPT_GEN_ALL_CORE_FT
Please follow up with Dr. Retana 2 weeks after your surgery: Monday, Aug 5th. Please call his office to schedule your appointment.   Please also call Dr. Genao (plastics surgery) and Dr. Salguero (endocrinology) to schedule your follow up appointments.   Please follow up with your primary care doctor

## 2019-08-02 NOTE — PROGRESS NOTE ADULT - REASON FOR ADMISSION
Lumbar radiculopathy
Lumbar stenosis
s/p laminectomy and spinal fusion
Back pain
Lumbar radiculopathy
s/p laminectomy and spinal fusion
s/p laminectomy and spinal fusion
Back Surgery

## 2019-08-02 NOTE — DISCHARGE NOTE NURSING/CASE MANAGEMENT/SOCIAL WORK - NSDCDPATPORTLINK_GEN_ALL_CORE
You can access the [facility], offered by [facility], by registering with the following website: [email or web address]

## 2019-08-02 NOTE — PROGRESS NOTE ADULT - ATTENDING COMMENTS
Pt seen on rounds this afternoon.  Has not had further hypoglycemia.  Most fingersticks have been in the 90 range, with one dip to as low as 74.  Will not pursue a workup for insulinoma given the results of the past few days, and because all of the hypoglycemia occurred post-prandially.
Pt seen on rounds this afternoon and events of the weekend reviewed.  She has not had any hypoglycemia today as noted by Dr. Bryson. Of note is that all of her hypoglycemic episodes have occurred post-prandially, so they appear to be in the category of reactive hypoglycemia.  This problem has also been exacerbated by the staff encouraging her to eat as much simple carbohydrate as possible in an attempt to avoid hypoglycemia, though this is exactly the type of food intake most likely to trigger an exaggerated insulin response to a meal and a subsequent drop in blood sugar a few hours later.  She clearly has excellent insulin reserves, evidenced by the lack of any significant hyperglycemia on the high-dose steroids.  An additional consideration is the question as to whether some of the low readings may be artifactual--due to the cutaneous vasoconstriction evidenced by her cold fingers.  She does describe feeling "shaky" in the morning at home, though fasting hypoglycemia has never been documented, and has not been seen in the hospital--but needs to be assessed off Decadron.  For now, we have encouraged her to follow a more balanced diet, and avoid concentrated sweets.  Have also asked her to avoid eating after supper tonight.  To check fingersticks 10 PM, 2 AM and pre-breakfast tomorrow.  If any values < 60 are noted, need to have venous blood drawn for glucose, insulin level, C-peptide and proinsulin before she is given any dextrose.  Will probably not try to carry out a supervised 48-72 hour fast
Pt seen on rounds this afternoon.  Has had no hypoglycemia in the past 24 hours, and the lowest fingerstick of 73 this morning is still in the normal range.  To continue a "balanced" diet with avoidance of refined sugar or excess carbs.  Still suspect that some of the previous lows on fingersticks were artifactual secondary to cutaneous vasoconstriction after reviewing simultaneous met panel results.  Continue to monitor
Patient seen and examined with house-staff during bedside rounds.  Resident note read, including vitals, physical findings, laboratory data, and radiological reports.   Revisions included below.  Direct personal management at bed side and extensive interpretation of the data.  Plan was outlined and discussed in details with the housestaff.  Decision making of high complexity  Action taken for acute disease activity to reflect the level of care provided:  - medication reconciliation  - review laboratory data  ortho to change the dressing  medically cleared for rehab
Patient seen and examined with house-staff during bedside rounds.  Resident note read, including vitals, physical findings, laboratory data, and radiological reports.   Revisions included below.  Direct personal management at bed side and extensive interpretation of the data.  Plan was outlined and discussed in details with the housestaff.  Decision making of high complexity  Action taken for acute disease activity to reflect the level of care provided:  - medication reconciliation  - review laboratory data
Patient seen and examined with house-staff during bedside rounds.  Resident note read, including vitals, physical findings, laboratory data, and radiological reports.   Revisions included below.  Direct personal management at bed side and extensive interpretation of the data.  Plan was outlined and discussed in details with the housestaff.  Decision making of high complexity  Action taken for acute disease activity to reflect the level of care provided:  - medication reconciliation  - review laboratory data  ortho to change the dressing
Patient seen and examined with house-staff during bedside rounds.  Resident note read, including vitals, physical findings, laboratory data, and radiological reports.   Revisions included below.  Direct personal management at bed side and extensive interpretation of the data.  Plan was outlined and discussed in details with the housestaff.  Decision making of high complexity  Action taken for acute disease activity to reflect the level of care provided:  - medication reconciliation  - review laboratory data  ortho to change the dressing

## 2019-08-02 NOTE — PROGRESS NOTE ADULT - SUBJECTIVE AND OBJECTIVE BOX
Interval Events: Reviewed  Patient seen and examined at bedside.    Patient is a 74y old  Female who presents with a chief complaint of s/p laminectomy and spinal fusion (02 Aug 2019 10:07)    she is doing well and not in pain  PAST MEDICAL & SURGICAL HISTORY:  Anxiety  Depression  Osteoporosis  History of neck surgery  History of back surgery: x4  History of lumbar fusion      MEDICATIONS:  Pulmonary:    Antimicrobials:    Anticoagulants:  enoxaparin Injectable 40 milliGRAM(s) SubCutaneous at bedtime    Cardiac:      Allergies    iodine (Unknown)    Intolerances        Vital Signs Last 24 Hrs  T(C): 36.8 (02 Aug 2019 05:05), Max: 36.8 (02 Aug 2019 00:41)  T(F): 98.2 (02 Aug 2019 05:05), Max: 98.3 (02 Aug 2019 00:41)  HR: 84 (02 Aug 2019 06:09) (76 - 85)  BP: 118/58 (02 Aug 2019 06:09) (95/48 - 123/62)  BP(mean): --  RR: 16 (02 Aug 2019 06:09) (16 - 16)  SpO2: 97% (02 Aug 2019 06:09) (96% - 98%)    08-01 @ 07:01  -  08-02 @ 07:00  --------------------------------------------------------  IN: 480 mL / OUT: 300 mL / NET: 180 mL          LABS:      CBC Full  -  ( 02 Aug 2019 06:14 )  WBC Count : 8.79 K/uL  RBC Count : 2.43 M/uL  Hemoglobin : 7.1 g/dL  Hematocrit : 22.6 %  Platelet Count - Automated : 407 K/uL  Mean Cell Volume : 93.0 fl  Mean Cell Hemoglobin : 29.2 pg  Mean Cell Hemoglobin Concentration : 31.4 gm/dL  Auto Neutrophil # : x  Auto Lymphocyte # : x  Auto Monocyte # : x  Auto Eosinophil # : x  Auto Basophil # : x  Auto Neutrophil % : x  Auto Lymphocyte % : x  Auto Monocyte % : x  Auto Eosinophil % : x  Auto Basophil % : x    08-02    136  |  102  |  9   ----------------------------<  91  4.5   |  30  |  0.65    Ca    8.3<L>      02 Aug 2019 06:14                          RADIOLOGY & ADDITIONAL STUDIES (The following images were personally reviewed):  Ybarra:                                     No  Urine output:                       adequate  DVT prophylaxis:                 Yes  Flattus:                                  Yes  Bowel movement:              No

## 2019-08-02 NOTE — PROGRESS NOTE ADULT - PROBLEM SELECTOR PLAN 4
The patient is hemodynamically stable.  The pain is controlled.  Patient is on DVT prophylaxis.  Patient is using incentive spirometry.  Oxygen saturation is acceptable.  Advance diet as tolerated.  Advance activity as tolerated.  Monitor for ileus.  Patient is on Laxatives.  Surgical wound is stable.  No indication for monitor bed.

## 2019-08-02 NOTE — PROGRESS NOTE ADULT - PROBLEM SELECTOR PLAN 3
no further episodes and she is followed by endo

## 2019-08-02 NOTE — PROGRESS NOTE ADULT - SUBJECTIVE AND OBJECTIVE BOX
HPI:  Taken from Dr. Retana's office note 7/17/2019  "Patient presents to be evaluated for surgical intervention for progressively worsening gait disturbance.  She reports a h/o cervical spine surgery, multiple lumbar spine procedures with the most recent x 3 years ago for scoliosis repair. Since her lower back surgery she has persistent lower back pain and most recent mid upper back pain with onset x 1 year ago.    She has undergone multiple conservative measures ranging from the use of oral pain medications (currently utilizes Percocet and Morphine tabs), NADIR's and as last resort a spinal cord stimulator trial which have all failed at provided lasting symptom relief.    She is off balance and best describes her gait a "walking on a train"" (22 Jul 2019 16:38)    Hospital Course:  7/22: POD#0: s/p T10-11 lami, T9-12 fusion.  No overnight events.  7/23: POD#1: PCA Dilaudid discontinued. Pain well controlled.  7/24: POD#2: No major events overnight. Pain is well controlled.  7/25: POD #3: PAL overnight. Aquacel dressing changed today. Lightheaded with PT, will reattempt tomorrow. Pt recommending home w/ home PT   7/26: POD #4: No issues overnight, pain well controlled. HMV removed today.  7/27: POD#5: No issues overnight. KEVIN drain possible removal today pending PRS recs. Pending BM. Started on flonase for congestion.   7/28: POD#6: Hyponatremia likely chronic  from Zoloft use. Sinus congestion improved with flonase. several hypoglycemia events. superficial KEVIN drain in place.  7/29: POD#7: hypoglycemia continues post prandial . Changed to diabetic diet to avoid simple sugars. Drain still in place.  7/30: POD#8: PAL  7/31: POD 9: PAL overnight, appreciate endo input  8/1 PAL overnight, IV Iron for anemia, KEVIN in place as per plastics, + BM, Endo recs are appreciated, neuro exam unchanged since yesterday  8/2: PAL overnight. Neuro stable. KEVIN removed yesterday. Plan for discharge home today    Vital Signs Last 24 Hrs  T(C): 36.8 (02 Aug 2019 00:41), Max: 36.8 (01 Aug 2019 05:30)  T(F): 98.3 (02 Aug 2019 00:41), Max: 98.3 (01 Aug 2019 05:30)  HR: 79 (02 Aug 2019 00:41) (78 - 85)  BP: 123/62 (02 Aug 2019 00:41) (92/53 - 123/62)  BP(mean): --  RR: 16 (02 Aug 2019 00:41) (16 - 17)  SpO2: 96% (02 Aug 2019 00:41) (96% - 100%)    I&O's Summary    31 Jul 2019 07:01  -  01 Aug 2019 07:00  --------------------------------------------------------  IN: 0 mL / OUT: 50 mL / NET: -50 mL    01 Aug 2019 07:01  -  02 Aug 2019 02:04  --------------------------------------------------------  IN: 480 mL / OUT: 300 mL / NET: 180 mL    PHYSICAL EXAM:  Neurological: AAxOX3, FC, speech coherent  CNII-XII: grossly intact  Motor: 5/5 motor strength x4ext  Incision/Wound: C/D/I    TUBES/LINES:  [] Ybarra  [] Lumbar Drain  [] Wound Drains  [] Others      DIET:  [] NPO  [x] Mechanical-Diabetic  [] Tube feeds      LABS:                        7.5    9.43  )-----------( 408      ( 01 Aug 2019 07:19 )             24.3     08-01    135  |  99  |  11  ----------------------------<  96  4.1   |  27  |  0.63    Ca    8.4      01 Aug 2019 07:19  Phos  3.9     07-31  Mg     2.1     07-31              CAPILLARY BLOOD GLUCOSE      POCT Blood Glucose.: 98 mg/dL (01 Aug 2019 21:58)  POCT Blood Glucose.: 99 mg/dL (01 Aug 2019 17:18)  POCT Blood Glucose.: 75 mg/dL (01 Aug 2019 11:56)  POCT Blood Glucose.: 93 mg/dL (01 Aug 2019 06:37)      Drug Levels: [] N/A    CSF Analysis: [] N/A      Allergies    iodine (Unknown)    Intolerances      MEDICATIONS:  Antibiotics:    Neuro:  acetaminophen   Tablet .. 650 milliGRAM(s) Oral every 6 hours PRN  clonazePAM  Tablet 1 milliGRAM(s) Oral every 8 hours PRN  diazepam    Tablet 5 milliGRAM(s) Oral every 8 hours  morphine  - Injectable 4 milliGRAM(s) IV Push every 4 hours PRN  morphine  IR 30 milliGRAM(s) Oral every 4 hours PRN  ondansetron Injectable 4 milliGRAM(s) IV Push every 6 hours PRN  sertraline 125 milliGRAM(s) Oral daily  traMADol 50 milliGRAM(s) Oral every 6 hours PRN  traZODone 50 milliGRAM(s) Oral at bedtime    Anticoagulation:  enoxaparin Injectable 40 milliGRAM(s) SubCutaneous at bedtime    OTHER:  aluminum hydroxide/magnesium hydroxide/simethicone Suspension 30 milliLiter(s) Oral every 4 hours PRN  artificial  tears Solution 1 Drop(s) Both EYES daily  bisacodyl 10 milliGRAM(s) Oral every 12 hours  bisacodyl Suppository 10 milliGRAM(s) Rectal daily  dextrose 40% Gel 15 Gram(s) Oral once PRN  dextrose 50% Injectable 12.5 Gram(s) IV Push once  dextrose 50% Injectable 12.5 Gram(s) IV Push once  dextrose 50% Injectable 25 Gram(s) IV Push once  dextrose 50% Injectable 25 Gram(s) IV Push once  docusate sodium 100 milliGRAM(s) Oral three times a day  famotidine    Tablet 20 milliGRAM(s) Oral two times a day  fluticasone propionate 50 MICROgram(s)/spray Nasal Spray 1 Spray(s) Both Nostrils two times a day  glucagon  Injectable 1 milliGRAM(s) IntraMuscular once PRN  insulin lispro (HumaLOG) corrective regimen sliding scale   SubCutaneous Before meals and at bedtime  magnesium hydroxide Suspension 30 milliLiter(s) Oral every 12 hours PRN  naloxone Injectable 0.1 milliGRAM(s) IV Push every 3 minutes PRN  polyethylene glycol 3350 17 Gram(s) Oral daily  polyethylene glycol 3350 17 Gram(s) Oral once  saline laxative (FLEET) Rectal Enema 1 Enema Rectal once  senna 2 Tablet(s) Oral at bedtime    IVF:  calcium carbonate 1250 mG  + Vitamin D (OsCal 500 + D) 1 Tablet(s) Oral daily  dextrose 5%. 1000 milliLiter(s) IV Continuous <Continuous>  iron sucrose IVPB 200 milliGRAM(s) IV Intermittent every 24 hours    CULTURES:    RADIOLOGY & ADDITIONAL TESTS:      ASSESSMENT:  74y Female s/p day 9 T10-11 laminectomy, T9-12 posterior fusion.    M47.14  Handoff  MEWS Score  Anxiety  Depression  Osteoporosis  Revision, fusion, spine, thoracic  Back pain  Postoperative state  Hypoglycemia  Hyponatremia  Anemia due to blood loss  Suspected deep vein thrombosis (DVT)  Back pain  History of neck surgery  History of back surgery  History of lumbar fusion      PLAN:  NEURO:  -neuro/spinal checks  -Pain control: tramadol morphine, valium   -Klonopin and sertraline for anxiety   -cont Ca and vit D   -monitor thoracic wound- plastics closure (Ronit Genao)  -SBP normotensive  -room air, satting well  -monitor hypoglycemia. Endocrine following  -monitor H/H. Medicine Dr.Mina gomez, cont IV Iron for post-op anemia  -bowel regimen  -DVT prophylaxis: SCDs, SQL  -D/W     DISPOSITION: Home with Home PT

## 2019-08-02 NOTE — PROGRESS NOTE ADULT - SUBJECTIVE AND OBJECTIVE BOX
INTERVAL HPI/OVERNIGHT EVENTS:    Patient is a 74y old  Female who presents with a chief complaint of thoracic spine instability (01 Aug 2019 19:27)      Pt reports the following symptoms:    CONSTITUTIONAL:  Negative fever or chills, feels well, good appetite  EYES:  Negative  blurry vision or double vision  CARDIOVASCULAR:  Negative for chest pain or palpitations  RESPIRATORY:  Negative for cough, wheezing, or SOB   GASTROINTESTINAL:  Negative for nausea, vomiting, diarrhea, constipation, or abdominal pain  GENITOURINARY:  Negative frequency, urgency or dysuria  NEUROLOGIC:  No headache, confusion, dizziness, lightheadedness    MEDICATIONS  (STANDING):  artificial  tears Solution 1 Drop(s) Both EYES daily  bisacodyl 10 milliGRAM(s) Oral every 12 hours  bisacodyl Suppository 10 milliGRAM(s) Rectal daily  calcium carbonate 1250 mG  + Vitamin D (OsCal 500 + D) 1 Tablet(s) Oral daily  dextrose 5%. 1000 milliLiter(s) (50 mL/Hr) IV Continuous <Continuous>  dextrose 50% Injectable 12.5 Gram(s) IV Push once  dextrose 50% Injectable 12.5 Gram(s) IV Push once  dextrose 50% Injectable 25 Gram(s) IV Push once  dextrose 50% Injectable 25 Gram(s) IV Push once  diazepam    Tablet 5 milliGRAM(s) Oral every 8 hours  docusate sodium 100 milliGRAM(s) Oral three times a day  enoxaparin Injectable 40 milliGRAM(s) SubCutaneous at bedtime  famotidine    Tablet 20 milliGRAM(s) Oral two times a day  fluticasone propionate 50 MICROgram(s)/spray Nasal Spray 1 Spray(s) Both Nostrils two times a day  insulin lispro (HumaLOG) corrective regimen sliding scale   SubCutaneous Before meals and at bedtime  iron sucrose IVPB 200 milliGRAM(s) IV Intermittent every 24 hours  polyethylene glycol 3350 17 Gram(s) Oral daily  polyethylene glycol 3350 17 Gram(s) Oral once  saline laxative (FLEET) Rectal Enema 1 Enema Rectal once  senna 2 Tablet(s) Oral at bedtime  sertraline 125 milliGRAM(s) Oral daily  traZODone 50 milliGRAM(s) Oral at bedtime    MEDICATIONS  (PRN):  acetaminophen   Tablet .. 650 milliGRAM(s) Oral every 6 hours PRN Temp greater or equal to 38C (100.4F), Mild Pain (1 - 3)  aluminum hydroxide/magnesium hydroxide/simethicone Suspension 30 milliLiter(s) Oral every 4 hours PRN Dyspepsia  clonazePAM  Tablet 1 milliGRAM(s) Oral every 8 hours PRN anxiety  dextrose 40% Gel 15 Gram(s) Oral once PRN Blood Glucose LESS THAN 70 milliGRAM(s)/deciliter  glucagon  Injectable 1 milliGRAM(s) IntraMuscular once PRN Glucose LESS THAN 70 milligrams/deciliter  magnesium hydroxide Suspension 30 milliLiter(s) Oral every 12 hours PRN Constipation  morphine  IR 30 milliGRAM(s) Oral every 4 hours PRN Severe Pain (7 - 10)  naloxone Injectable 0.1 milliGRAM(s) IV Push every 3 minutes PRN For ANY of the following changes in patient status:  A. RR LESS THAN 10 breaths per minute, B. Oxygen saturation LESS THAN 90%, C. Sedation score of 6  ondansetron Injectable 4 milliGRAM(s) IV Push every 6 hours PRN Nausea  traMADol 50 milliGRAM(s) Oral every 6 hours PRN Moderate Pain (4 - 6)      PHYSICAL EXAM  Vital Signs Last 24 Hrs  T(C): 36.8 (02 Aug 2019 05:05), Max: 36.8 (02 Aug 2019 00:41)  T(F): 98.2 (02 Aug 2019 05:05), Max: 98.3 (02 Aug 2019 00:41)  HR: 84 (02 Aug 2019 06:09) (76 - 85)  BP: 118/58 (02 Aug 2019 06:09) (95/48 - 123/62)  BP(mean): --  RR: 16 (02 Aug 2019 06:09) (16 - 16)  SpO2: 97% (02 Aug 2019 06:09) (96% - 98%)    Constitutional: wn/wd in NAD.   HEENT: NCAT, MMM, OP clear, EOMI, no proptosis or lid retraction  Neck: no thyromegaly or palpable thyroid nodules   Respiratory: lungs CTAB.  Cardiovascular: regular rhythm, normal S1 and S2, no audible murmurs, no peripheral edema  GI: soft, NT/ND, no masses/HSM appreciated.  Neurology: no tremors, DTR 2+  Skin: no visible rashes/lesions  Psychiatric: AAO x 3, normal affect/mood.    LABS:                        7.1    8.79  )-----------( 407      ( 02 Aug 2019 06:14 )             22.6     08-02    136  |  102  |  9   ----------------------------<  91  4.5   |  30  |  0.65    Ca    8.3<L>      02 Aug 2019 06:14              HbA1C: 5.3 % (07-23 @ 07:01)    CAPILLARY BLOOD GLUCOSE      POCT Blood Glucose.: 92 mg/dL (02 Aug 2019 06:47)  POCT Blood Glucose.: 98 mg/dL (01 Aug 2019 21:58)  POCT Blood Glucose.: 99 mg/dL (01 Aug 2019 17:18)  POCT Blood Glucose.: 75 mg/dL (01 Aug 2019 11:56)      Insulin Sliding Scale requirements X 24 Hours:    RADIOLOGY & ADDITIONAL TESTS:    A/P: 74y Female with history of DM type II presenting for       1.  DM -     Please continue           units lantus at bedtime  / in the morning and        units lispro with meals and lispro moderate / low dose sliding scale 4 times daily with meals and at bedtime.  Please continue consistent carbohydrate diet.      Goal FSG is   Will continue to monitor   For discharge, pt can continue    Pt can follow up at discharge with [facility] by calling  to make an appointment.   Will discuss case with     and update primary team

## 2019-08-05 ENCOUNTER — FORM ENCOUNTER (OUTPATIENT)
Age: 74
End: 2019-08-05

## 2019-08-05 PROBLEM — M81.0 AGE-RELATED OSTEOPOROSIS WITHOUT CURRENT PATHOLOGICAL FRACTURE: Chronic | Status: ACTIVE | Noted: 2019-07-19

## 2019-08-05 PROBLEM — F41.9 ANXIETY DISORDER, UNSPECIFIED: Chronic | Status: ACTIVE | Noted: 2019-07-19

## 2019-08-05 PROBLEM — F32.9 MAJOR DEPRESSIVE DISORDER, SINGLE EPISODE, UNSPECIFIED: Chronic | Status: ACTIVE | Noted: 2019-07-19

## 2019-08-05 LAB — MISCELLANEOUS TEST NAME: SIGNIFICANT CHANGE UP

## 2019-08-06 ENCOUNTER — OUTPATIENT (OUTPATIENT)
Dept: OUTPATIENT SERVICES | Facility: HOSPITAL | Age: 74
LOS: 1 days | End: 2019-08-06
Payer: COMMERCIAL

## 2019-08-06 ENCOUNTER — APPOINTMENT (OUTPATIENT)
Dept: NEUROSURGERY | Facility: CLINIC | Age: 74
End: 2019-08-06
Payer: MEDICARE

## 2019-08-06 VITALS
HEIGHT: 65 IN | WEIGHT: 116 LBS | DIASTOLIC BLOOD PRESSURE: 62 MMHG | BODY MASS INDEX: 19.33 KG/M2 | TEMPERATURE: 98.2 F | HEART RATE: 77 BPM | RESPIRATION RATE: 16 BRPM | SYSTOLIC BLOOD PRESSURE: 102 MMHG

## 2019-08-06 DIAGNOSIS — G89.18 OTHER ACUTE POSTPROCEDURAL PAIN: ICD-10-CM

## 2019-08-06 DIAGNOSIS — Z98.890 OTHER SPECIFIED POSTPROCEDURAL STATES: Chronic | ICD-10-CM

## 2019-08-06 DIAGNOSIS — Z98.1 ARTHRODESIS STATUS: Chronic | ICD-10-CM

## 2019-08-06 PROCEDURE — 72070 X-RAY EXAM THORAC SPINE 2VWS: CPT

## 2019-08-06 PROCEDURE — 72070 X-RAY EXAM THORAC SPINE 2VWS: CPT | Mod: 26

## 2019-08-06 PROCEDURE — 99024 POSTOP FOLLOW-UP VISIT: CPT

## 2019-08-07 LAB — SULFONYLUREA SERPL-MCNC: SIGNIFICANT CHANGE UP

## 2019-08-09 ENCOUNTER — MEDICATION RENEWAL (OUTPATIENT)
Age: 74
End: 2019-08-09

## 2019-08-09 RX ORDER — TRAMADOL HYDROCHLORIDE 50 MG/1
50 TABLET, COATED ORAL
Qty: 60 | Refills: 0 | Status: ACTIVE | COMMUNITY
Start: 2019-08-09 | End: 1900-01-01

## 2019-08-12 PROBLEM — G89.18 POSTOPERATIVE PAIN: Status: ACTIVE | Noted: 2019-08-09

## 2019-08-12 NOTE — REVIEW OF SYSTEMS
[As Noted in HPI] : as noted in HPI [Poor Coordination] : poor coordination [Difficulty Walking] : difficulty walking [Joint Pain] : joint pain [Arthralgias] : arthralgias [Joint Swelling] : no joint swelling [Joint Stiffness] : no joint stiffness [Limb Pain] : no limb pain [Limb Swelling] : no limb swelling [Negative] : Heme/Lymph

## 2019-08-12 NOTE — ASSESSMENT
[FreeTextEntry1] : 1. S/P Complex Thoracic Fusion with improved pain syndrome.\par PAin well controlled with medications.\par 2. Postop wound assessed by Dr. Retana.\par 3. Postop appointment scheduled with Dr. Retana\par 4. Continue pain medications as needed\par 5.Education provided regarding plan of care.\par 6. Thoracic Xrays completed today.\par \par \par

## 2019-08-12 NOTE — REASON FOR VISIT
[de-identified] : S/P THORACIC DECOMPRESSIVE LAMINECTOMIES T10 &T11 and partial Facetectomies and foraminotomies at each level for spinal stenosis; T9/T10; T10/11; T11/12; T12/L1 Posterolateral Fusion; T9-L2 Pedicle screw instrumentation Bilaterally; Local Autograft; Morselized allograft; use of BMP-2; Bone marrow Aspiration; separate fascial incision; sterotactic spinal navigation for pedicle screw placement; use of intraoperative CT; Fluoro [de-identified] : 7/22/19 [de-identified] : Doing well Postop; Thoracic Incision intact with Staples. occlusive dressing removed; No signs of infection noted. She is ambulating with cane. Reports improved Pain syndrome and improved quality of life. She continue to take pain medications for postoperative pain and is getting significant relief. She is scheduled to see Dr. Cottrell, Plastic surgeon for staple removal.

## 2019-08-12 NOTE — PHYSICAL EXAM
[General Appearance - In No Acute Distress] : in no acute distress [General Appearance - Alert] : alert [Staple Intact] : closed with intact staples [Longitudinal] : longitudinal [No Drainage] : without drainage [Oriented To Time, Place, And Person] : oriented to person, place, and time [Normal Skin] : normal [Cranial Nerves Optic (II)] : visual acuity intact bilaterally,  pupils equal round and reactive to light [Cranial Nerves Oculomotor (III)] : extraocular motion intact [Cranial Nerves Facial (VII)] : face symmetrical [Cranial Nerves Vestibulocochlear (VIII)] : hearing was intact bilaterally [Cranial Nerves Trigeminal (V)] : facial sensation intact symmetrically [Cranial Nerves Accessory (XI - Cranial And Spinal)] : head turning and shoulder shrug symmetric [Cranial Nerves Glossopharyngeal (IX)] : tongue and palate midline [Cranial Nerves Hypoglossal (XII)] : there was no tongue deviation with protrusion [Sensation Tactile Decrease] : light touch was intact [Limited Balance] : the patient's balance was impaired [FreeTextEntry6] : Improving leg weakness [Round Back] : a round back deformity [Deformity] : deformity [Scoliosis] : scoliosis [Outer Ear] : the ears and nose were normal in appearance [Sclera] : the sclera and conjunctiva were normal [] : no respiratory distress [Heart Rate And Rhythm] : heart rate was normal and rhythm regular [Exaggerated Use Of Accessory Muscles For Inspiration] : no accessory muscle use [Abdomen Soft] : soft [FreeTextEntry1] : Ambulates with walker [Skin Color & Pigmentation] : normal skin color and pigmentation

## 2019-09-11 ENCOUNTER — APPOINTMENT (OUTPATIENT)
Dept: SPINE | Facility: CLINIC | Age: 74
End: 2019-09-11

## 2019-10-09 ENCOUNTER — APPOINTMENT (OUTPATIENT)
Dept: CT IMAGING | Facility: HOSPITAL | Age: 74
End: 2019-10-09

## 2019-10-29 ENCOUNTER — FORM ENCOUNTER (OUTPATIENT)
Age: 74
End: 2019-10-29

## 2019-10-30 ENCOUNTER — OUTPATIENT (OUTPATIENT)
Dept: OUTPATIENT SERVICES | Facility: HOSPITAL | Age: 74
LOS: 1 days | End: 2019-10-30
Payer: COMMERCIAL

## 2019-10-30 ENCOUNTER — APPOINTMENT (OUTPATIENT)
Dept: SPINE | Facility: CLINIC | Age: 74
End: 2019-10-30
Payer: COMMERCIAL

## 2019-10-30 VITALS
SYSTOLIC BLOOD PRESSURE: 121 MMHG | BODY MASS INDEX: 19.33 KG/M2 | RESPIRATION RATE: 18 BRPM | TEMPERATURE: 97.8 F | DIASTOLIC BLOOD PRESSURE: 70 MMHG | HEART RATE: 64 BPM | WEIGHT: 116 LBS | HEIGHT: 65 IN | OXYGEN SATURATION: 95 %

## 2019-10-30 DIAGNOSIS — Z98.890 OTHER SPECIFIED POSTPROCEDURAL STATES: Chronic | ICD-10-CM

## 2019-10-30 DIAGNOSIS — M79.604 PAIN IN RIGHT LEG: ICD-10-CM

## 2019-10-30 DIAGNOSIS — Z98.1 ARTHRODESIS STATUS: Chronic | ICD-10-CM

## 2019-10-30 DIAGNOSIS — M79.605 PAIN IN RIGHT LEG: ICD-10-CM

## 2019-10-30 PROCEDURE — 99214 OFFICE O/P EST MOD 30 MIN: CPT

## 2019-10-30 PROCEDURE — 72080 X-RAY EXAM THORACOLMB 2/> VW: CPT

## 2019-10-30 PROCEDURE — 72080 X-RAY EXAM THORACOLMB 2/> VW: CPT | Mod: 26

## 2019-10-31 PROBLEM — M79.604 LEG PAIN, BILATERAL: Status: ACTIVE | Noted: 2019-05-09

## 2019-10-31 NOTE — ADDENDUM
[FreeTextEntry1] : 2019\par \par \par \par Re:	Nicole Baeza \par :	49\par \par I saw Nicole Baeza in neurosurgical follow-up.  She is doing well.  Her wound has healed.  X-rays are fine.  She will be seen in routine follow up.  \par \par \par \par  \par Talon Retana MD\par [facility] #[id number]_[facility]\par \par \par

## 2019-10-31 NOTE — PHYSICAL EXAM
[General Appearance - Alert] : alert [General Appearance - Well Nourished] : well nourished [General Appearance - Well-Appearing] : healthy appearing [Neck Appearance] : the appearance of the neck was normal [Exaggerated Use Of Accessory Muscles For Inspiration] : no accessory muscle use [Edema] : there was no peripheral edema [No Spinal Tenderness] : no spinal tenderness [Skin Color & Pigmentation] : normal skin color and pigmentation [] : no rash

## 2019-10-31 NOTE — ASSESSMENT
[FreeTextEntry1] : Continued f/u with pain management for medication management\par Increase activity as tolerated..\par Reminded patient that she may not be completely pain free despite surgical intervention, she may continue to require oral medications but over time the amount needed should be decreased\par The feeling in her back about which she is concerned should improve with time.\par F/U in the office in 3-6 months\par

## 2019-10-31 NOTE — REASON FOR VISIT
[FreeTextEntry1] : Patient underwent a Laminectomy T10 and T11. Wide facetectomies and foraminotomies T10-T11 bilaterally, posterior spinal fusion T9 to T11 inclusive, spinal instrumentation T9-L2 \par Surgery Date: 7.22.2019 \par She comes for a routine office visit for evaluation and progress check.\par She continues to do well preop pain has significantly improved but she continues to need oral opioid medications which is managed by her pain MD\par Reports walking much better\par She is concerned about a sensation of "something in my back" which she thinks are nylon sutures. She also discussed this with plastics and was told the internal sutures will dissolve with time\par \par Denies any new focal deficits at today's visits\par

## 2020-01-16 NOTE — PROGRESS NOTE ADULT - MINUTES
Goals      Establish PCP relationships and regularly scheduled appointments. 12/19 Patient encouraged to  follow-up with PCP and specialist as directed, keep a list of her medications she take to bring to f/u appointments. Pt.reports she is doing well, will f/u with PCP, Anh Anne as needed. CTN informed pt.of Lyndon Espino (virtual visit) medical provider 24 hours a day, seven days a week via mobile device, tablet or log into a secure website from your computer, at [email or web address] CTN reminded pt.to call today to schedule f/u with (Ortho Surg.) Dr. May Escoto ([phone]). Pt.reports she will call for f/u. CTN will f/u with pt.in 1-2 weeks. -    1/16 Pt.reports she f/u with Dr. Lili Perez, reports she is doing well. -               Understands red flags post discharge. 12/19 CTN reminded pt.to wash hands before coming in contact with surgical site, monitor   s/sof infection such as; increased pain, swelling, warmth, or redness; streaks leading from the area, pus or fever. CTN encourage pt.to notify surgeon or PCP office asap. CTN contact informatin provided, pt.verbalizes understanding. CTN will f/u with pt.in 1-2 weeks. -    1/16 Pt.reports surgical site healed well without complication, reports she has area back that remain sore and slight tingling to L foot, reports she has f/u with surgeon, reports she is doing much better.  -[address]
15
25

## 2020-07-14 ENCOUNTER — APPOINTMENT (OUTPATIENT)
Dept: SPINE | Facility: CLINIC | Age: 75
End: 2020-07-14
Payer: COMMERCIAL

## 2020-07-14 ENCOUNTER — APPOINTMENT (OUTPATIENT)
Dept: SPINE | Facility: CLINIC | Age: 75
End: 2020-07-14

## 2020-07-14 DIAGNOSIS — M54.6 PAIN IN THORACIC SPINE: ICD-10-CM

## 2020-07-14 DIAGNOSIS — Z98.1 ARTHRODESIS STATUS: ICD-10-CM

## 2020-07-14 DIAGNOSIS — G89.29 PAIN IN THORACIC SPINE: ICD-10-CM

## 2020-07-14 PROCEDURE — 99214 OFFICE O/P EST MOD 30 MIN: CPT | Mod: 95

## 2020-07-15 PROBLEM — Z98.1 H/O SPINAL FUSION: Status: ACTIVE | Noted: 2019-10-30

## 2020-07-15 PROBLEM — M54.6 CHRONIC BILATERAL THORACIC BACK PAIN: Status: ACTIVE | Noted: 2019-05-09

## 2020-08-12 ENCOUNTER — APPOINTMENT (OUTPATIENT)
Dept: SPINE | Facility: CLINIC | Age: 75
End: 2020-08-12
Payer: COMMERCIAL

## 2020-09-03 ENCOUNTER — APPOINTMENT (OUTPATIENT)
Dept: SPINE | Facility: CLINIC | Age: 75
End: 2020-09-03
Payer: COMMERCIAL

## 2020-09-03 VITALS
HEART RATE: 61 BPM | HEIGHT: 65 IN | SYSTOLIC BLOOD PRESSURE: 129 MMHG | BODY MASS INDEX: 19.33 KG/M2 | DIASTOLIC BLOOD PRESSURE: 73 MMHG | TEMPERATURE: 98.3 F | OXYGEN SATURATION: 98 % | WEIGHT: 116 LBS | RESPIRATION RATE: 18 BRPM

## 2020-09-03 DIAGNOSIS — M51.34 OTHER INTERVERTEBRAL DISC DEGENERATION, THORACIC REGION: ICD-10-CM

## 2020-09-03 DIAGNOSIS — M47.14 OTHER SPONDYLOSIS WITH MYELOPATHY, THORACIC REGION: ICD-10-CM

## 2020-09-03 DIAGNOSIS — M25.70 OTHER INTERVERTEBRAL DISC DEGENERATION, THORACIC REGION: ICD-10-CM

## 2020-09-03 DIAGNOSIS — R26.9 UNSPECIFIED ABNORMALITIES OF GAIT AND MOBILITY: ICD-10-CM

## 2020-09-03 PROCEDURE — 99214 OFFICE O/P EST MOD 30 MIN: CPT

## 2020-09-04 PROBLEM — R26.9 GAIT DISTURBANCE: Status: ACTIVE | Noted: 2019-05-15

## 2020-09-04 PROBLEM — M47.14 MYELOPATHY OF THORACIC REGION: Status: ACTIVE | Noted: 2019-05-15

## 2020-09-04 PROBLEM — M51.34 DEGENERATION OF INTERVERTEBRAL DISC OF THORACIC REGION WITH OSTEOPHYTE: Status: ACTIVE | Noted: 2019-07-18

## 2021-06-21 NOTE — DISCHARGE NOTE PROVIDER - NSDCHHCONTACT_GEN_ALL_CORE_FT
None
As certified below, I, or a nurse practitioner or physician assistant working with me, had a face-to-face encounter that meets the physician face-to-face encounter requirements.

## 2022-07-21 NOTE — PROGRESS NOTE ADULT - SUBJECTIVE AND OBJECTIVE BOX
Interval Events: Reviewed  Patient seen and examined at bedside.    Patient is a 74y old  Female who presents with a chief complaint of Back Surgery (30 Jul 2019 09:56)    she is doing Ok and did PT with no difficulty  PAST MEDICAL & SURGICAL HISTORY:  Anxiety  Depression  Osteoporosis  History of neck surgery  History of back surgery: x4  History of lumbar fusion      MEDICATIONS:  Pulmonary:    Antimicrobials:    Anticoagulants:  enoxaparin Injectable 40 milliGRAM(s) SubCutaneous at bedtime    Cardiac:      Allergies    iodine (Unknown)    Intolerances        Vital Signs Last 24 Hrs  T(C): 37.3 (30 Jul 2019 09:30), Max: 37.6 (29 Jul 2019 16:22)  T(F): 99.1 (30 Jul 2019 09:30), Max: 99.6 (29 Jul 2019 16:22)  HR: 103 (30 Jul 2019 09:30) (75 - 103)  BP: 148/58 (30 Jul 2019 09:30) (103/64 - 148/58)  BP(mean): --  RR: 17 (30 Jul 2019 09:30) (12 - 18)  SpO2: 95% (30 Jul 2019 09:30) (91% - 97%)    07-29 @ 07:01  -  07-30 @ 07:00  --------------------------------------------------------  IN: 180 mL / OUT: 450 mL / NET: -270 mL          LABS:      CBC Full  -  ( 30 Jul 2019 07:48 )  WBC Count : 11.84 K/uL  RBC Count : 2.55 M/uL  Hemoglobin : 7.4 g/dL  Hematocrit : 23.5 %  Platelet Count - Automated : 355 K/uL  Mean Cell Volume : 92.2 fl  Mean Cell Hemoglobin : 29.0 pg  Mean Cell Hemoglobin Concentration : 31.5 gm/dL  Auto Neutrophil # : x  Auto Lymphocyte # : x  Auto Monocyte # : x  Auto Eosinophil # : x  Auto Basophil # : x  Auto Neutrophil % : x  Auto Lymphocyte % : x  Auto Monocyte % : x  Auto Eosinophil % : x  Auto Basophil % : x    07-30    138  |  103  |  10  ----------------------------<  91  4.1   |  29  |  0.60    Ca    8.4      30 Jul 2019 07:48  Phos  3.9     07-30  Mg     1.9     07-30                          RADIOLOGY & ADDITIONAL STUDIES (The following images were personally reviewed):  Ybarra:                                     No  Urine output:                       adequate  DVT prophylaxis:                 Yes  Flattus:                                  Yes  Bowel movement:              No Gen: AAO x 3, NAD  Skin: No rashes or lesions  HEENT: NC/AT, PERRLA, EOMI, MMM  Resp: unlabored CTAB  Cardiac: rrr s1s2, no murmurs, rubs or gallops  GI: ND, +BS, Soft, NT  Ext: no pedal edema or deformities. No palpable bone deformity or tenderness.   Neuro: AOx3, L  strength 3/5, otherwise LUE 2/5. RUE strength 3/5 Gen: AAO x 3, NAD  Skin: No rashes or lesions  HEENT: NC/AT, PERRLA, EOMI, MMM  Resp: unlabored CTAB  Cardiac: rrr s1s2, no murmurs, rubs or gallops  GI: ND, +BS, Soft, NT  Ext: no pedal edema or deformities. No palpable bone deformity or tenderness.   Neuro: AOx3, L  strength 3/5, otherwise LUE 2/5. RUE strength 2/5, 1/5 RLE. EOM Intact, tracking appropriately. No facial droop.

## 2024-05-09 NOTE — PRE-OP CHECKLIST - SPO2 (%)
Show Topical Anesthesia Variable?: Yes Consent: This lesion was irritated. The patient's consent was obtained including but not limited to risks of crusting, scabbing, blistering, scarring, darker or lighter pigmentary change, recurrence, incomplete removal and infection. Medical Necessity Clause: This procedure was medically necessary because the lesions that were treated were: Medical Necessity Information: It is in your best interest to select a reason for this procedure from the list below. All of these items fulfill various CMS LCD requirements except the new and changing color options. Spray Paint Technique: No Post-Care Instructions: I reviewed with the patient in detail post-care instructions. Patient is to wear sunprotection, and avoid picking at any of the treated lesions. Pt may apply Vaseline to crusted or scabbing areas. Number Of Freeze-Thaw Cycles: 3 freeze-thaw cycles Detail Level: Simple Spray Paint Text: The liquid nitrogen was applied to the skin utilizing a spray paint frosting technique. 96

## 2024-05-23 NOTE — OCCUPATIONAL THERAPY INITIAL EVALUATION ADULT - ASSISTIVE DEVICE:SIT/SUPINE, REHAB EVAL
Chemical Cauterization Of Granulation Tissue: Silver Nitrate
Detail Level: Detailed
HOB elevated to approximately 30 degrees

## 2024-07-08 NOTE — REASON FOR VISIT
Hematology & Oncology Consultation    Date of Service  7/7/2024    Referring Physician  Jeremy M Gonda, M.D.    Consulting Physician  Paul Patton M.D.    Reason for Consultation  CVA    History of Presenting Illness - limited by patient's mental status, supplemented by review of EMR, discussion with RN and mother at bedside/  37 y.o. female who presented 7/5/2024 with unilateral weakness. Patient had return to baseline while at OSH and there was no thrombolysis. There was attempted thrombectomy, but it was not successful. Presently she's off the floor for CT. Patient had presented to her PCP complaining of chest pain and was advised to f/u with psychotherapist for anxiety.     Review of Systems  Review of Systems   Unable to perform ROS: Mental status change       Past Medical History   has a past medical history of Abdominal pain, left upper quadrant, Anxiety, Bronchitis, Chest pain, Cough, Depression, Early satiety, Gastritis, Helicobacter pylori (H. pylori), Jaw pain, Migraine, Musculoskeletal pain, Otitis media, Right wrist pain, Sinus disorder, Sinusitis, SOB (shortness of breath), Thrombocytosis, Tonsillitis, URI (upper respiratory infection), and Yeast infection.    Surgical History   has a past surgical history that includes abdominal exploration.    Family History  family history includes Cancer in her paternal grandfather; Diabetes in her maternal grandmother, mother, paternal grandfather, and paternal grandmother; Heart Disease in her maternal grandmother; Hypertension in her father.    Social History   reports that she has never smoked. She has never used smokeless tobacco. She reports that she does not drink alcohol and does not use drugs.    Medications  Prior to Admission Medications   Prescriptions Last Dose Informant Patient Reported? Taking?   HYDROcodone-acetaminophen (NORCO) 5-325 MG Tab per tablet unknown at unknown Family Member, Rx Bottle (For Med Information) No No   Sig: Take 1 Tablet by  mouth 1 time a day as needed (joint pain and body aches) for up to 30 days.   HYDROcodone-acetaminophen (NORCO) 5-325 MG Tab per tablet new script at n/a Family Member, Rx Bottle (For Med Information) No No   Si pill by mouth once in a day with food as needed for severe joint pain and bodyaches.  Rx for 30 days.  Do not fill till 2024   HYDROcodone-acetaminophen (NORCO) 5-325 MG Tab per tablet new script at n/a Family Member, Rx Bottle (For Med Information) No No   Si pill by mouth once a day as needed for severe joint pain and bodyaches.  Rx for 30 days.  Do not fill till 2024.   LORazepam (ATIVAN) 1 MG Tab unknown at unknown Family Member, Rx Bottle (For Med Information) No No   Sig: Take 1 Tablet by mouth 1 time a day as needed for Anxiety (anxiousness) for up to 90 days. Do not fill till 24   Upadacitinib ER (RINVOQ) 15 MG TABLET SR 24 HR unknown at unknown Family Member, Rx Bottle (For Med Information) No No   Sig: Take 15 mg by mouth every day at 6 PM.   acetaminophen/caffeine/butalbital 300-40-50 mg (FIORICET) -40 MG Cap capsule unknown at unknown Family Member, Rx Bottle (For Med Information) No No   Sig: Take 1 Capsule by mouth 1 time a day as needed for Headache for up to 30 days.   amitriptyline (ELAVIL) 100 MG Tab unknown at unknown Family Member, Rx Bottle (For Med Information) No No   Sig: TAKE ONE TABLET BY MOUTH AT BEDTIME AS NEEDED   Patient taking differently: sleep   docusate sodium (COLACE) 100 MG Cap unknown at unknown Family Member, Rx Bottle (For Med Information) Yes Yes   Sig: Take 100 mg by mouth 2 times a day.   folic acid (FOLVITE) 1 MG Tab unknown at unknown Family Member, Rx Bottle (For Med Information) No No   Sig: TAKE TWO TABLETS BY MOUTH DAILY   gabapentin (NEURONTIN) 300 MG Cap unknown at unknown Family Member, Rx Bottle (For Med Information) No No   Sig: Take 1 Capsule by mouth 2 times a day.   hydroxychloroquine (PLAQUENIL) 200 MG Tab unknown at unknown  Family Member, Rx Bottle (For Med Information) No No   Sig: TAKE TWO TABLETS BY MOUTH MONDAY-FRIDAY. TAKE ONE TABLET BY MOUTH DAILY ON SATURDAY AND    methotrexate 2.5 MG tablet 2024 at unknown Family Member, Rx Bottle (For Med Information) No No   Sig: Take 8 Tablets by mouth every 7 days.   metoprolol SR (TOPROL XL) 25 MG TABLET SR 24 HR unknown at unknown Family Member, Rx Bottle (For Med Information) Yes No   Si mg every day.   metronidazole (METROCREAM) 0.75 % cream unknown at unknown Family Member, Rx Bottle (For Med Information) No No   Sig: Apply 1 Application topically 2 times a day.   naproxen (NAPROSYN) 500 MG Tab unknown at unknown Family Member, Rx Bottle (For Med Information) No No   Sig: Take 1 Tablet by mouth 2 times daily with meals as needed (moderate pain).   phentermine 15 MG capsule unknown at unknown Family Member, Rx Bottle (For Med Information) No No   Sig: Take 1 Capsule by mouth every morning for 60 days.   rosuvastatin (CRESTOR) 5 MG Tab unknown at unknown Family Member, Rx Bottle (For Med Information) No No   Sig: TAKE 1/2 TO 1 TABLET BY MOUTH EVERY EVENING AS DIRECTED   Patient taking differently: 2.5-5 mg every evening. TAKE 1/2 TO 1 TABLET BY MOUTH EVERY EVENING AS DIRECTED    Alternating days      Facility-Administered Medications: None       Allergies  Allergies   Allergen Reactions    Pcn [Penicillins] Rash    Augmentin Diarrhea     diarrhea    Zithromax [Azithromycin] Rash     Rash       Physical Exam  Temp:  [36.4 °C (97.5 °F)-37.4 °C (99.4 °F)] 37.2 °C (99 °F)  Pulse:  [] 90  Resp:  [6-140] 35  BP: (101-211)/() 130/59  SpO2:  [84 %-97 %] 91 %    Physical Exam  Vitals and nursing note reviewed.   Constitutional:       Appearance: She is obese.   HENT:      Head: Normocephalic and atraumatic.      Right Ear: External ear normal.      Left Ear: External ear normal.      Nose: Nose normal.      Mouth/Throat:      Mouth: Mucous membranes are moist.       Pharynx: Oropharynx is clear.   Cardiovascular:      Rate and Rhythm: Normal rate and regular rhythm.      Pulses: Normal pulses.      Heart sounds: Normal heart sounds.   Pulmonary:      Effort: Pulmonary effort is normal.      Breath sounds: Normal breath sounds.   Abdominal:      General: Abdomen is flat. Bowel sounds are normal.      Palpations: Abdomen is soft.   Musculoskeletal:         General: Normal range of motion.      Cervical back: Normal range of motion and neck supple.   Skin:     Findings: Rash present.       Not compliant with exam        Fluids  Date 07/07/24 0700 - 07/08/24 0659   Shift 9853-8403 0290-5028 7196-7016 24 Hour Total   INTAKE   P.O. 60   60   I.V. 343.7   343.7   IV Piggyback 138.1   138.1   Shift Total 541.9   541.9   OUTPUT   Urine 1025   1025   Shift Total 1025   1025   Weight (kg) 82.5 82.5 82.5 82.5       Laboratory  Recent Labs     07/05/24  0533 07/06/24  0440 07/07/24  0219   WBC 12.3* 14.6* 15.3*   RBC 4.02* 3.64* 3.23*   HEMOGLOBIN 12.9 11.8* 10.6*   HEMATOCRIT 37.9 34.5* 31.4*   MCV 94.3 94.8 97.2   MCH 32.1 32.4 32.8   MCHC 34.0 34.2 33.8   RDW 46.9 48.1 48.7   PLATELETCT 526* 566* 502*   MPV 9.0 9.4 9.3     Recent Labs     07/05/24  0456 07/06/24  0440 07/06/24  1820 07/07/24  0219 07/07/24  0815 07/07/24  1420   SODIUM 139 143   < > 144 142 141   POTASSIUM 4.1 3.7  --  3.9  --   --    CHLORIDE 107 107  --  108  --   --    CO2 19* 15*  --  23  --   --    GLUCOSE 112* 142*  --  134*  --   --    BUN 8 4*  --  3*  --   --    CREATININE 0.59 0.60  --  0.56  --   --    CALCIUM 8.8 8.8  --  8.4*  --   --     < > = values in this interval not displayed.     Recent Labs     07/05/24  0930 07/05/24  0950   APTT 21.7* <20.0*   INR 1.00 1.06          Recent Labs     07/06/24  0440   TRIGLYCERIDE 217*   HDL 70   LDL 81        Imaging  DX-CHEST-LIMITED (1 VIEW)   Final Result         New right IJ central venous catheter is in good position without a pneumothorax.      CT-HEAD W/O    Final Result      Diffuse low-attenuation of the right hemisphere is consistent with a MCA territory infarct. Small areas of increased density within this region may represent petechial hemorrhages. There is effacement of the right lateral ventricle with approximately 3    mm of midline shift.               CT-CTA PELVIS WITH & W/O-POST PROCESS   Final Result      1.  No CTA evidence of active extravasation of contrast to suggest active bleeding.   2.  There is a subcutaneous hematoma in the right groin adjacent to the common femoral vessels.   3.  Contrast in the urinary bladder from the angiographic procedure earlier today.      EC-ECHOCARDIOGRAM COMPLETE W/O CONT   Final Result      MR-BRAIN-W/O   Final Result         Acute infarct in the right frontoparietal region and right insula predominantly involving the cortex. Acute infarct also noted in the right caudate and putamen.      Minimal subarachnoid hemorrhage noted in the sylvian fissure and the sulci over the convexity, likely procedure related.      No midline shift or significant mass effect.      IR-THROMBO MECHANICAL ARTERY,INIT   Final Result         37-year-old who presented with left-sided symptoms was found to have a right MCA M3 occlusion with a perfusion defect identified on CT perfusion imaging. Patient underwent emergent mechanical thrombectomy with multiple attempts to recanalize the parietal    M3 branch being unsuccessful. Final angiographic images demonstrate good antegrade flow in the MCA branches other than the occluded right MCA M3 parietal branch.      Short segment dissection of the right ICA just below the skull base was identified on the final angiogram.  Findings discussed with Dr. Fernandez and given the nonprogressive nature of the dissection without flow limitation, the dissection will be managed by    initiation of antithrombotic/anticoagulation therapy.      Final recanalization score: TICI 2 Kavita CHRISTIANSON was  "physically present and participated during the entire procedure of the IR-THROMBO MECHANICAL ARTERY,INIT.                  DX-CHEST-PORTABLE (1 VIEW)   Final Result      Mild interstitial prominence could be related to hypoinflation. No consolidation or pleural effusion.      CT-CTA NECK WITH & W/O-POST PROCESSING   Final Result      CT angiogram of the neck within normal limits.      CT-CTA HEAD WITH & W/O-POST PROCESS   Final Result      Occlusion of the superior sylvian branch M2 segment RIGHT middle cerebral artery.      These findings were discussed with GAIL AGUIRRE II on 7/5/2024 5:33 AM.            CT-CEREBRAL PERFUSION ANALYSIS   Final Result      1. Cerebral blood flow less than 30% possibly representing completed infarct = 5 mL. Based on distribution of this finding, this is unlikely to represent artifact.      2. T Max more than 6 seconds possibly representing combination of completed infarct and ischemia = 28 mL. Based on the distribution of this finding, this is unlikely to represent artifact.      3. Mismatched volume possibly representing ischemic brain/penumbra= 23 mL      4.  Please note that this cerebral perfusion study and report is Quantitative and targets supratentorial (cerebral) perfusion for evaluation of large vessel territory acute ischemia/infarction. For example, lacunar infarcts, and brainstem/posterior fossa    ischemia/infarction are not evaluated on this study.  Data acquisition is subject to artifacts which can yield non-anatomically plausible perfusion maps which may be due to motion, bolus timing, signal to noise ratio, or other technical factors.    Perfusion map abnormalities which show non-anatomic distributions are likely artifact.   This study is not \"stand-alone\" and should only be utilized for diagnosis, management/treatment in correlation with CT, CTA, and/or MRI and clinical factors.         IR-PICC LINE PLACEMENT W/ GUIDANCE > AGE 5    (Results Pending)   CT-HEAD " W/O    (Results Pending)       Assessment/Plan  CVA - continue lovenox for now and transition to NOAC  Anemia - hospital acquired, likely secondary to phlebotomy, monitor for bleeding, f/u iron, B12 and folate    Case discussed with patient, mother at bedside and RN    Thank you very much for the consult, please call with any questions.    Paul Patton M.D.       [Consultation] : a consultation visit